# Patient Record
Sex: FEMALE | Race: WHITE | Employment: OTHER | URBAN - METROPOLITAN AREA
[De-identification: names, ages, dates, MRNs, and addresses within clinical notes are randomized per-mention and may not be internally consistent; named-entity substitution may affect disease eponyms.]

---

## 2018-10-08 ENCOUNTER — HOSPITAL ENCOUNTER (OUTPATIENT)
Dept: CT IMAGING | Age: 84
Discharge: HOME | End: 2018-10-10
Payer: MEDICARE

## 2018-10-08 DIAGNOSIS — J31.2: ICD-10-CM

## 2018-10-08 PROCEDURE — 70490 CT SOFT TISSUE NECK W/O DYE: CPT

## 2018-12-07 ENCOUNTER — HOSPITAL ENCOUNTER (OUTPATIENT)
Age: 84
Discharge: HOME | End: 2018-12-09
Payer: MEDICARE

## 2018-12-07 ENCOUNTER — HOSPITAL ENCOUNTER (OUTPATIENT)
Dept: GENERAL RADIOLOGY | Age: 84
Discharge: HOME | End: 2018-12-09
Payer: MEDICARE

## 2018-12-07 DIAGNOSIS — R52 PAIN: ICD-10-CM

## 2018-12-07 PROCEDURE — 72110 X-RAY EXAM L-2 SPINE 4/>VWS: CPT

## 2018-12-07 PROCEDURE — 73502 X-RAY EXAM HIP UNI 2-3 VIEWS: CPT

## 2019-09-22 ENCOUNTER — HOSPITAL ENCOUNTER (EMERGENCY)
Age: 85
Discharge: HOME | End: 2019-09-22
Attending: EMERGENCY MEDICINE
Payer: MEDICARE

## 2019-09-22 VITALS
TEMPERATURE: 98.8 F | RESPIRATION RATE: 16 BRPM | DIASTOLIC BLOOD PRESSURE: 78 MMHG | HEIGHT: 63 IN | WEIGHT: 160 LBS | SYSTOLIC BLOOD PRESSURE: 161 MMHG | OXYGEN SATURATION: 97 % | BODY MASS INDEX: 28.35 KG/M2 | HEART RATE: 80 BPM

## 2019-09-22 DIAGNOSIS — N39.0 URINARY TRACT INFECTION IN FEMALE: Primary | ICD-10-CM

## 2019-09-22 LAB
ANION GAP SERPL CALCULATED.3IONS-SCNC: 11 MMOL/L (ref 7–16)
BACTERIA: ABNORMAL /HPF
BILIRUBIN URINE: NEGATIVE
BLOOD, URINE: NEGATIVE
BUN BLDV-MCNC: 32 MG/DL (ref 8–23)
CALCIUM SERPL-MCNC: 9.1 MG/DL (ref 8.6–10.2)
CASTS UA: ABNORMAL /LPF
CHLORIDE BLD-SCNC: 103 MMOL/L (ref 98–107)
CLARITY: ABNORMAL
CO2: 27 MMOL/L (ref 22–29)
COLOR: YELLOW
CREAT SERPL-MCNC: 1.2 MG/DL (ref 0.5–1)
EPITHELIAL CELLS, UA: ABNORMAL /HPF
GFR AFRICAN AMERICAN: 51
GFR NON-AFRICAN AMERICAN: 42 ML/MIN/1.73
GLUCOSE BLD-MCNC: 119 MG/DL (ref 74–99)
GLUCOSE URINE: NEGATIVE MG/DL
HCT VFR BLD CALC: 36.2 % (ref 34–48)
HEMOGLOBIN: 11.9 G/DL (ref 11.5–15.5)
KETONES, URINE: ABNORMAL MG/DL
LEUKOCYTE ESTERASE, URINE: ABNORMAL
MCH RBC QN AUTO: 32.3 PG (ref 26–35)
MCHC RBC AUTO-ENTMCNC: 32.9 % (ref 32–34.5)
MCV RBC AUTO: 98.4 FL (ref 80–99.9)
MUCUS: PRESENT
NITRITE, URINE: NEGATIVE
PDW BLD-RTO: 14 FL (ref 11.5–15)
PH UA: 6 (ref 5–9)
PLATELET # BLD: 196 E9/L (ref 130–450)
PMV BLD AUTO: 10.9 FL (ref 7–12)
POTASSIUM SERPL-SCNC: 4.2 MMOL/L (ref 3.5–5)
PROTEIN UA: NEGATIVE MG/DL
RBC # BLD: 3.68 E12/L (ref 3.5–5.5)
RBC UA: ABNORMAL /HPF (ref 0–2)
RENAL EPITHELIAL, UA: ABNORMAL /HPF
SODIUM BLD-SCNC: 141 MMOL/L (ref 132–146)
SPECIFIC GRAVITY UA: 1.02 (ref 1–1.03)
UROBILINOGEN, URINE: 0.2 E.U./DL
WBC # BLD: 6 E9/L (ref 4.5–11.5)
WBC UA: >20 /HPF (ref 0–5)

## 2019-09-22 PROCEDURE — 99284 EMERGENCY DEPT VISIT MOD MDM: CPT

## 2019-09-22 PROCEDURE — 80048 BASIC METABOLIC PNL TOTAL CA: CPT

## 2019-09-22 PROCEDURE — 85027 COMPLETE CBC AUTOMATED: CPT

## 2019-09-22 PROCEDURE — 93005 ELECTROCARDIOGRAM TRACING: CPT | Performed by: EMERGENCY MEDICINE

## 2019-09-22 PROCEDURE — 81001 URINALYSIS AUTO W/SCOPE: CPT

## 2019-09-22 PROCEDURE — 6370000000 HC RX 637 (ALT 250 FOR IP): Performed by: EMERGENCY MEDICINE

## 2019-09-22 PROCEDURE — 2580000003 HC RX 258: Performed by: EMERGENCY MEDICINE

## 2019-09-22 RX ORDER — 0.9 % SODIUM CHLORIDE 0.9 %
1000 INTRAVENOUS SOLUTION INTRAVENOUS ONCE
Status: COMPLETED | OUTPATIENT
Start: 2019-09-22 | End: 2019-09-22

## 2019-09-22 RX ORDER — CIPROFLOXACIN 500 MG/1
250 TABLET, FILM COATED ORAL 2 TIMES DAILY
Qty: 10 TABLET | Refills: 0 | Status: SHIPPED | OUTPATIENT
Start: 2019-09-22 | End: 2019-10-02

## 2019-09-22 RX ORDER — CIPROFLOXACIN 500 MG/1
500 TABLET, FILM COATED ORAL ONCE
Status: COMPLETED | OUTPATIENT
Start: 2019-09-22 | End: 2019-09-22

## 2019-09-22 RX ADMIN — SODIUM CHLORIDE 1000 ML: 9 INJECTION, SOLUTION INTRAVENOUS at 17:29

## 2019-09-22 RX ADMIN — CIPROFLOXACIN HYDROCHLORIDE 500 MG: 500 TABLET, FILM COATED ORAL at 18:39

## 2019-09-22 ASSESSMENT — PAIN DESCRIPTION - PAIN TYPE: TYPE: ACUTE PAIN

## 2019-09-22 ASSESSMENT — PAIN DESCRIPTION - ORIENTATION: ORIENTATION: RIGHT;LEFT

## 2019-09-22 ASSESSMENT — PAIN DESCRIPTION - FREQUENCY: FREQUENCY: CONTINUOUS

## 2019-09-22 ASSESSMENT — PAIN DESCRIPTION - DESCRIPTORS: DESCRIPTORS: BURNING

## 2019-09-22 ASSESSMENT — PAIN - FUNCTIONAL ASSESSMENT: PAIN_FUNCTIONAL_ASSESSMENT: ACTIVITIES ARE NOT PREVENTED

## 2019-09-22 ASSESSMENT — PAIN SCALES - GENERAL: PAINLEVEL_OUTOF10: 7

## 2019-09-22 ASSESSMENT — PAIN DESCRIPTION - LOCATION: LOCATION: ABDOMEN;FLANK

## 2019-09-22 NOTE — ED PROVIDER NOTES
HPI:  9/22/19, Time: 4:53 PM         Kayla Hagen is a 81 y.o. female presenting to the ED for abdominal pain and flank pain. This been ongoing over the last 2 weeks and gradually worsening. She states she has associated burning with urination and burning after urination. Nuys any vaginal bleeding or discharge. Denies any fevers chills, chest tightness pressure, nausea or vomiting, shortness of breath. Denies any history of kidney stones but does states she has had UTIs in the past.  Review shows he is allergic to Keflex. Review of Systems:   Pertinent positives and negatives are stated within HPI, all other systems reviewed and are negative.          --------------------------------------------- PAST HISTORY ---------------------------------------------  Past Medical History:  has a past medical history of Anxiety, Depression, Dysphagia, GERD (gastroesophageal reflux disease), Hyperlipidemia, Hypertension, Hypothyroidism, and Osteoarthritis. Past Surgical History:  has a past surgical history that includes joint replacement; Hysterectomy; Appendectomy; Upper gastrointestinal endoscopy; Endoscopy, colon, diagnostic (3/3/2014); Cholecystectomy, laparoscopic; and Carpal tunnel release (Right, 10/05/2016). Social History:  reports that she has never smoked. She has never used smokeless tobacco. She reports that she drinks about 1.0 standard drinks of alcohol per week. She reports that she does not use drugs. Family History: family history is not on file. The patients home medications have been reviewed.     Allergies: Keflex [cephalexin] and Reglan [metoclopramide hcl]    -------------------------------------------------- RESULTS -------------------------------------------------  All laboratory and radiology results have been personally reviewed by myself   LABS:  Results for orders placed or performed during the hospital encounter of 09/22/19   Urinalysis with Microscopic   Result Value Ref

## 2019-09-23 LAB
EKG ATRIAL RATE: 86 BPM
EKG P AXIS: 45 DEGREES
EKG P-R INTERVAL: 152 MS
EKG Q-T INTERVAL: 418 MS
EKG QRS DURATION: 118 MS
EKG QTC CALCULATION (BAZETT): 500 MS
EKG R AXIS: 0 DEGREES
EKG T AXIS: 83 DEGREES
EKG VENTRICULAR RATE: 86 BPM

## 2019-09-23 PROCEDURE — 93010 ELECTROCARDIOGRAM REPORT: CPT | Performed by: INTERNAL MEDICINE

## 2020-09-24 LAB — MAMMOGRAPHY, EXTERNAL: NORMAL

## 2020-11-03 ENCOUNTER — OFFICE VISIT (OUTPATIENT)
Dept: PHYSICAL MEDICINE AND REHAB | Age: 86
End: 2020-11-03
Payer: MEDICARE

## 2020-11-03 VITALS
OXYGEN SATURATION: 99 % | SYSTOLIC BLOOD PRESSURE: 149 MMHG | HEART RATE: 69 BPM | HEIGHT: 63 IN | TEMPERATURE: 97.2 F | BODY MASS INDEX: 25.98 KG/M2 | DIASTOLIC BLOOD PRESSURE: 82 MMHG | WEIGHT: 146.6 LBS

## 2020-11-03 PROCEDURE — 20610 DRAIN/INJ JOINT/BURSA W/O US: CPT | Performed by: PHYSICAL MEDICINE & REHABILITATION

## 2020-11-03 PROCEDURE — 99204 OFFICE O/P NEW MOD 45 MIN: CPT | Performed by: PHYSICAL MEDICINE & REHABILITATION

## 2020-11-03 RX ORDER — TRIAMCINOLONE ACETONIDE 40 MG/ML
40 INJECTION, SUSPENSION INTRA-ARTICULAR; INTRAMUSCULAR ONCE
Status: COMPLETED | OUTPATIENT
Start: 2020-11-03 | End: 2020-11-03

## 2020-11-03 RX ORDER — LIDOCAINE HYDROCHLORIDE 10 MG/ML
4 INJECTION, SOLUTION INFILTRATION; PERINEURAL ONCE
Status: COMPLETED | OUTPATIENT
Start: 2020-11-03 | End: 2020-11-03

## 2020-11-03 RX ORDER — GABAPENTIN 100 MG/1
CAPSULE ORAL
COMMUNITY
Start: 2020-09-29 | End: 2020-12-15

## 2020-11-03 RX ADMIN — LIDOCAINE HYDROCHLORIDE 4 ML: 10 INJECTION, SOLUTION INFILTRATION; PERINEURAL at 10:27

## 2020-11-03 RX ADMIN — TRIAMCINOLONE ACETONIDE 40 MG: 40 INJECTION, SUSPENSION INTRA-ARTICULAR; INTRAMUSCULAR at 10:27

## 2020-11-03 NOTE — PROGRESS NOTES
[facility] PM[facility]R at [facility] [place], [address]  Phone: [phone]  Fax: [phone]    New Patient Evaluation for Ashley Salinas  : 4/15/1926  MRN: [id number]  PCP: Tom Mari MD  REF: No ref. provider found  Date of visit: 11/3/20    Chief Complaint   Patient presents with    Shoulder Pain     Bilateral shoulder pain x 1 year. Right shoulder pain is worse. Taking Gabapentin with no relief.  Neck Pain     Pain in neck x 1 year. Had a nerve block during the summer it helped for 3 days. Thank you for your referral of Ashley Salinas to the Department of PM&R for evaluation of shoulder pain. As you know, this is a 81 y.o. RHD female with pertinent past medical history of MAURICE, hypothyroidism, hypertension, hyperlipidemia, GERD, dysphagia, depression, anxiety. HPI:   Patient presents today as new patient evaluation for bilateral (R>>L) lateral arm pain without injury at onset. Patient does explain her symptoms vaguely. Prior to my evaluation, she was seeing PM&R outside the system. She did receive one cervical epidural steroid injection which provided no benefit. The patient does not have any shoulder films. Pain began 1 year ago without inciting event. Location: bilateral lateral shoulder, R>L   Quality: \"Pain\"  Severity: 8/10. Pain Alleviated minimally by heat. Aggravated by overhead, activities. Timing: constant  Sensation: + Vague numbness/tingling of bilateral lateral arms. PRIOR INJURIES/TREATMENT:  Ice/Heat: Yes, temporary relief  Brace: No  Medications:    Currently: Acetaminophen 1000 mg PRN, gabapentin 300 mg qHS   Past: None  Physical Therapy: None  Injection: Cervical CHETAN 6 months ago, minimal benefit  Prior Surgery in location of pain: No  Prior Fracture/Injury in location of pain: No     Falls: No     No new N/T, weakness. No new B/B changes. The prior workup has included: Xray, MRI. Diagnostic Studies:  -MRI C-SPINE from [facility] dated 6/29/2020 (reviewed personally on 11/3/2020) demonstrates:   Findings: The normal cervical lordotic curvature is maintained. No significant scoliosis is noted. There is no evidence of spondylolisthesis. The bone marrow shows no evidence of suspicious infiltrate. C2-3: No significant abnormality. C3-4: There is a shallow left-sided posterior disc osteophyte complex which focally contacts the thecal sac. There is minimal bilateral uncovertebral joint arthritis. C4-5: There is moderate disc space narrowing. There is a posterior disc osteophyte complex which impinges upon the cervical spinal cord. There is mild bilateral uncovertebral joint and facet joint osteoarthritis without significant neuroforaminal narrowing. C5-6: There is severe disc space narrowing. There is a broad-based posterior disc osteophyte complex which leads to mild chronic spinal cord compression. The underlying cord shows no evidence of edema, contusion, or myelomalacia. There is mild to moderate left-sided uncovertebral joint osteoarthritis, mild right vertebral joint arthritis, moderate left and mild right facet joint osteoarthritis. There is mild resulting narrowing of the left neural foramen. C6-7: There is severe disc space narrowing. There is a posterior disc osteophyte complex which leads to mild chronic spinal cord compression at this level. No spinal cord contusion, edema, or myelomalacia is seen. C7-T1: Unremarkable. Impression:  Degenerative disc disease and degenerative joint disease. At C5-C6 and C6-C7, there is chronic spinal cord compression without evidence of spinal cord contusion, edema, or myelomalacia. Focal neuroforaminal narrowing, focal facet joint and uncovertebral joint osteoarthritis as above.     Allergies   Allergen Reactions    Keflex [Cephalexin]     Reglan [Metoclopramide Hcl] Other (See Comments)     Makes me feel \"high\"       Current Outpatient Medications   Medication Sig Dispense Refill    gabapentin (NEURONTIN) 100 MG capsule TAKE 1 CAPSULE BY MOUTH THREE TIMES DAILY      doxycycline monohydrate (MONODOX) 100 MG capsule Take 1 capsule by mouth 2 times daily 20 capsule 0    ranitidine (ZANTAC) 300 MG tablet Take 300 mg by mouth nightly      metoprolol (TOPROL-XL) 25 MG XL tablet Take 1 tablet by mouth daily. Instructed to take with sip water am of procedure 30 tablet 0    Multiple Vitamins-Minerals (THERAPEUTIC MULTIVITAMIN-MINERALS) tablet Take 1 tablet by mouth daily       Omega 3 1000 MG CAPS Take 1,000 mg by mouth 3 tabs nightly      Ascorbic Acid (VITAMIN C) 500 MG CAPS Take 1 tablet by mouth daily       Calcium-Vitamin D 500-125 MG-UNIT TABS Take 1 tablet by mouth daily       Cholecalciferol (VITAMIN D-3) 5000 UNITS TABS Take 1,000 Units by mouth daily       b complex vitamins capsule Take 1 capsule by mouth daily       vitamin E 400 UNIT capsule Take 400 Units by mouth daily       vitamin B-12 (CYANOCOBALAMIN) 100 MCG tablet Take 250 mcg by mouth daily       Multiple Vitamins-Minerals (PRESERVISION/LUTEIN) CAPS Take 1 capsule by mouth 2 times daily       losartan (COZAAR) 50 MG tablet Take 50 mg by mouth daily       amitriptyline (ELAVIL) 50 MG tablet Take 50 mg by mouth nightly.  ALPRAZolam (XANAX) 0.5 MG tablet Take 0.5 mg by mouth nightly as needed.  citalopram (CELEXA) 20 MG tablet Take 20 mg by mouth daily. Instructed to take with sip water am of procedure      levothyroxine (SYNTHROID) 100 MCG tablet Take 100 mcg by mouth daily       simvastatin (ZOCOR) 40 MG tablet Take 40 mg by mouth nightly. No current facility-administered medications for this visit.         Past Medical History:   Diagnosis Date    Anxiety     Depression     Dysphagia 2/20/2014    GERD (gastroesophageal reflux disease) 2/20/2014    Hyperlipidemia     Hypertension     Hypothyroidism     Osteoarthritis        Past Surgical History:   Procedure Laterality Date    APPENDECTOMY      CARPAL TUNNEL RELEASE Right 10/05/2016    RIGHT INDEX FINGER TRIGGER RELEASE RIGHT THUMB CARPAL METACARPAL ARTHROPLASTY WITH LIGAMENT RECONSTRUCTION    CHOLECYSTECTOMY, LAPAROSCOPIC      ENDOSCOPY, COLON, DIAGNOSTIC  3/3/2014    biopsy    HYSTERECTOMY      45 yrs ago     JOINT REPLACEMENT      bilateral knees    UPPER GASTROINTESTINAL ENDOSCOPY         History reviewed. No pertinent family history. Social History     Tobacco Use    Smoking status: Never Smoker    Smokeless tobacco: Never Used   Substance Use Topics    Alcohol use: Yes     Alcohol/week: 1.0 standard drinks     Types: 1 Glasses of wine per week     Comment: once a month, occasionally a glass of wine    Drug use: No          Functional Status: The patient is able to ambulate and perform activities of daily living without the use of an assistive device. Occupation: The patient is currently unemployed. ROS:    Constitutional: Denies fevers, chills, unintentional weight loss     Skin: Denies rash or skin changes     Eyes: Denies vision changes    Ears/Nose/Throat: Denies nasal congestion or sore throat     Respiratory: Denies SOB or cough     Cardiovascular: Denies CP, palpitations, edema      Gastrointestinal: Denies abdominal pain, N/V, constipation, or diarrhea    Genitourinary: Denies urinary symptoms    Neurologic: See HPI.     MSK: See HPI. Psychiatric: Denies sleep disturbance, anxiety, depression    Hematologic/Lymphatic/Immunologic: Denies bruising     Physical Exam:   Blood pressure (!) 149/82, pulse 69, temperature 97.2 °F (36.2 °C), temperature source Infrared, height 5' 3\" (1.6 m), weight 146 lb 9.6 oz (66.5 kg), SpO2 99 %. PAIN: 8/10  GEN APPEARANCE: Pleasant, well developed, well nourished in no acute distress;  Alert and Oriented; body habitus is average  PSYCH: Normal mood and affect   HEAD: Normocephalic; no facial asymetry noted  EYES: Pupils equal and reactive; EOMI  RESP: Breathing non-labored, no cyanosis  CARDIO: No pitting edema in bilateral lower extremities   SKIN: No lesions grossly visible on bilateral shoulders    MSK:    Shoulder Exam:    Inspection   Shoulders are symmetric without muscle atrophy. AC joints are without deformity    Palpation right side: There are no bony deformities noted to the clavicle, acromion, scapula, humerus, or sternum  There is tenderness at the [facility] joint  There is no tenderness at the Sternoclavicular joint  There is tenderness (not concordant) to the bicipital tendon  There is concordant tenderness to the greater tuberosity  There is no levator scapula tenderness   There is no periscapular tenderness     Palpation left side: There are no bony deformities noted to the clavicle, acromion, scapula, humerus, or sternum  There is tenderness at the [facility] joint  There is no tenderness at the Sternoclavicular joint  There is tenderness (not concordant) to the bicipital tendon  There is concordant tenderness to the greater tuberosity  There is no levator scapula tenderness   There is no periscapular tenderness     AROM OF SHOULDER: RIGHT   LEFT  Flexion:     180   180  Abduction:    170   170  IR:      90   90  ER:     90   90    PROM of SHOULDER:  Relatively painless and full.     Neurological Exam:  Strength:   R  L  Deltoid   5  5  Int rot   5 5  Ext rot   4 4  Biceps   5  5  Triceps  5  5  Wrist Ext  5  5  Finger Abd  5  5    Sensory:  intact LT in BUEs and BLEs     Reflexes:   R  L  Biceps   (2) (2)  Triceps  (2) (2)  BR   (2) (2)  Wick  (-) (-)    PROVOCATIVE TESTS right side:    Neer: neg  Campuzano: neg  Painful arc: neg  Drop arm test: neg  Full Can: neg  Empty Can: neg  Green's: minimally +  Speed's: neg  Apley's Scarf: +  Load and shift: minimally +    PROVOCATIVE TESTS left side:    Neer: neg  Campuzano: neg  Painful arc: neg  Drop arm test: neg  Full Can: neg  Empty Can: neg  Green's: minimally +  Speed's: neg  Apley's Scarf: +  Load and shift: minimally +    Spurling's Maneuver was negative bilaterally. Impression:   Zainab Ward is a 81 y.o. female who presents with bilateral arm pain secondary to ? shoulder impingement vs cervical degenerative changes. 1. Chronic right shoulder pain    2. Right arm pain    3. Chronic left shoulder pain    4. Left arm pain    5. Chronic neck pain        Recommendations:  1. Discussion: I have discussed the natural history of the above diagnoses with her in detail, with more than 1/2 of the visit spent counseling her on the pathophysiology and treatment options. Discussed trialing a right SAB steroid injection today as a diagnostic and/or therapeutic measure. Patient not interested in PT which is first-line treatment. 2. Activity Modification: Patient to continue being as physically active as possible while avoiding complete inactivity. No current restrictions placed. 3. Medications: Reviewed medications and no changes made at this time. 4. Referrals: Recommending formal physical therapy but patient declined at this time. No surgical referral needed at this point. 5. Images: X-rays of bilateral shoulders to evaluate for any arthritic changes. 6. Labs: None today. 7. DME: None today. 8. Injection: R SAB injection as below. If this is not effective, can consider ultrasound-guided GH steroid injection VS suprascapular nerve block. 9. Follow-up: 2-4 weeks. At that time we will review progress with above interventions. The patient was educated about the diagnosis, prognosis, indications, risks and benefits of treatment. An opportunity to ask questions was given to the patient and questions were answered. The patient agreed to proceed with the recommended treatment as described above.      Right subacromial bursa steroid injection -   Pre-procedure Verification: verified patient, procedure and site, verified availability of needed equipment and medications, verified patient consent and verified current history and physical  Site Marked: Yes  Timeout: completed prior to procedure, confirmed correct patient, confirmed correct procedure, confirmed correct site and confirmed correct consent. No heat sources were identified. After having explained the potential risks (pain, local bleeding, infection, hyperglycemia, hypertension, lack of benefit, cartilage decay), benefits and alternatives of the right subacromial bursa steroid injection, the patient gave verbal informed consent to proceed. A timeout was performed, as above. The right posterior shoulder was confirmed and marked, and the area was prepped in aseptic fashion with ChloraPrep. Cold spray was used to superficially anesthetize the skin. A 25 gauge, 1.5 inch needle was directed into the above area. The injection was completed with 1 cc of 40 mg/cc of Kenalog mixed with 4 cc of 1% Lidocaine. The patient tolerated the procedure without difficulty and was instructed on post-injection care. Pre-procedure pain score: 8/10  Post-procedure pain score: \"improved\"    Thank you for the consultation and for allowing me to participate in the care of this patient. Sincerely,     Dylon Watkins., DO  Physical Medicine and Rehabilitation     Please note that the above documentation was prepared using voice recognition software. Every attempt was made to ensure accuracy but there may be spelling, grammatical, and contextual errors.

## 2020-11-04 ENCOUNTER — HOSPITAL ENCOUNTER (OUTPATIENT)
Age: 86
Discharge: HOME | End: 2020-11-06
Payer: MEDICARE

## 2020-11-04 ENCOUNTER — HOSPITAL ENCOUNTER (OUTPATIENT)
Dept: GENERAL RADIOLOGY | Age: 86
Discharge: HOME | End: 2020-11-06
Payer: MEDICARE

## 2020-11-04 PROCEDURE — 73030 X-RAY EXAM OF SHOULDER: CPT

## 2020-12-15 ENCOUNTER — OFFICE VISIT (OUTPATIENT)
Dept: PHYSICAL MEDICINE AND REHAB | Age: 86
End: 2020-12-15
Payer: MEDICARE

## 2020-12-15 VITALS
TEMPERATURE: 98.1 F | SYSTOLIC BLOOD PRESSURE: 128 MMHG | BODY MASS INDEX: 24.98 KG/M2 | DIASTOLIC BLOOD PRESSURE: 74 MMHG | WEIGHT: 141 LBS | HEIGHT: 63 IN

## 2020-12-15 PROCEDURE — 99215 OFFICE O/P EST HI 40 MIN: CPT | Performed by: PHYSICAL MEDICINE & REHABILITATION

## 2020-12-15 PROCEDURE — 20606 DRAIN/INJ JOINT/BURSA W/US: CPT | Performed by: PHYSICAL MEDICINE & REHABILITATION

## 2020-12-15 PROCEDURE — 20611 DRAIN/INJ JOINT/BURSA W/US: CPT | Performed by: PHYSICAL MEDICINE & REHABILITATION

## 2020-12-15 RX ORDER — GABAPENTIN 100 MG/1
100 CAPSULE ORAL 3 TIMES DAILY
Qty: 90 CAPSULE | Refills: 2 | Status: SHIPPED
Start: 2020-12-15 | End: 2021-04-07 | Stop reason: SDUPTHER

## 2020-12-15 RX ORDER — TRIAMCINOLONE ACETONIDE 40 MG/ML
40 INJECTION, SUSPENSION INTRA-ARTICULAR; INTRAMUSCULAR ONCE
Status: COMPLETED | OUTPATIENT
Start: 2020-12-15 | End: 2020-12-15

## 2020-12-15 RX ORDER — LIDOCAINE HYDROCHLORIDE 10 MG/ML
9 INJECTION, SOLUTION INFILTRATION; PERINEURAL ONCE
Status: COMPLETED | OUTPATIENT
Start: 2020-12-15 | End: 2020-12-15

## 2020-12-15 RX ADMIN — LIDOCAINE HYDROCHLORIDE 9 ML: 10 INJECTION, SOLUTION INFILTRATION; PERINEURAL at 10:26

## 2020-12-15 RX ADMIN — TRIAMCINOLONE ACETONIDE 40 MG: 40 INJECTION, SUSPENSION INTRA-ARTICULAR; INTRAMUSCULAR at 10:27

## 2020-12-15 RX ADMIN — TRIAMCINOLONE ACETONIDE 40 MG: 40 INJECTION, SUSPENSION INTRA-ARTICULAR; INTRAMUSCULAR at 10:28

## 2020-12-15 NOTE — PROGRESS NOTES
[facility]  [address]  Phone: [phone]  Fax: [phone]    Follow Up Evaluation for Anai Thornton  : 4/15/1926  MRN: [id number]  PCP: Debo Lundebrg MD  REF: No ref. provider found  Date of visit: 12/15/20  Last Visit: 11/3/20    As you know, this is a 81 y.o. female with pertinent past medical history of MAURICE, hypothyroidism, hypertension, hyperlipidemia, GERD, dysphagia, depression, anxiety. Chief Complaint   Patient presents with    Shoulder Pain     having shoulder and hip pain had injections last visit opposite should is now hurting had xrays on  [facility] mild arthriits and versitis    Other     [facility] pain doctor prescribed gabapentin and it helped the patient but daughter didnt care for taking her there so she started coming to [facility] but never resumed the gabapentin       HPI:   Patient presents today in follow-up regarding several musculoskeletal complaints. Recall, at last visit, a right SAB steroid injection was performed which provided complete pain relief. Now, she has pain in the left shoulder and left lateral hip. Pain began several weeks ago without inciting event. Denies any falls or trauma to the regions of pain. Location: Left shoulder (AC J), left lateral hip  Quality: Pain  Severity: 8/10. Pain Alleviated by nothing. Aggravated by movement of shoulder, walking, lying on left side  Timing: constant  Sensation: No numbness or tingling     PRIOR INJURIES/TREATMENT:  Ice/Heat: Yes, temporary relief  Brace: No  Medications:               Currently: Acetaminophen 1000 mg PRN, gabapentin 300 mg qHS              Past: None  Physical Therapy: None  Injection: Cervical CHETAN 6 months ago, minimal benefit  Prior Surgery in location of pain: No  Prior Fracture/Injury in location of pain: No     Falls: No     No new N/T, weakness.  No new B/B changes.     The prior workup has included: Xray, MRI. Diagnostic Studies:  - MRI C-SPINE from [facility] dated 6/29/2020 (report reviewed personally on 12/15/2020) demonstrates:   Findings: The normal cervical lordotic curvature is maintained. No significant scoliosis is noted.     There is no evidence of spondylolisthesis.     The bone marrow shows no evidence of suspicious infiltrate.     C2-3: No significant abnormality.     C3-4: There is a shallow left-sided posterior disc osteophyte complex which focally contacts the thecal sac. There is minimal bilateral uncovertebral joint arthritis.     C4-5: There is moderate disc space narrowing. There is a posterior disc osteophyte complex which impinges upon the cervical spinal cord. There is mild bilateral uncovertebral joint and facet joint osteoarthritis without significant neuroforaminal narrowing.     C5-6: There is severe disc space narrowing. There is a broad-based posterior disc osteophyte complex which leads to mild chronic spinal cord compression. The underlying cord shows no evidence of edema, contusion, or myelomalacia. There is mild to moderate left-sided uncovertebral joint osteoarthritis, mild right vertebral joint arthritis, moderate left and mild right facet joint osteoarthritis. There is mild resulting narrowing of the left neural foramen.     C6-7: There is severe disc space narrowing. There is a posterior disc osteophyte complex which leads to mild chronic spinal cord compression at this level. No spinal cord contusion, edema, or myelomalacia is seen.     C7-T1: Unremarkable.     Impression:  Degenerative disc disease and degenerative joint disease. At C5-C6 and C6-C7, there is chronic spinal cord compression without evidence of spinal cord contusion, edema, or myelomalacia. Focal neuroforaminal narrowing, focal facet joint and uncovertebral joint osteoarthritis as above. - XR BILAT SHOULDERS from [facility] dated 11/4/2020 (reviewed personally on 12/15/2020) demonstrates:    FINDINGS:   Right shoulder: Mild osteoarthritis at the glenohumeral and AC joints.  The   acromion is pointed and downward sloping thought to be greatly predisposing   to it no fracture or dislocation.       Left shoulder: Mild osteoarthritis at the glenohumeral and AC joints.  The   acromion is mildly pointed and is thought to be somewhat predisposing to   rotator cuff injury.  No acute fracture or dislocation.  Old fracture present   of the left 7th rib.  Normal soft tissues.       The bones are demineralized. Allergies   Allergen Reactions    Keflex [Cephalexin]     Reglan [Metoclopramide Hcl] Other (See Comments)     Makes me feel \"high\"       Current Outpatient Medications   Medication Sig Dispense Refill    gabapentin (NEURONTIN) 100 MG capsule Take 1 capsule by mouth 3 times daily for 90 days. Intended supply: 90 days 90 capsule 2    doxycycline monohydrate (MONODOX) 100 MG capsule Take 1 capsule by mouth 2 times daily 20 capsule 0    ranitidine (ZANTAC) 300 MG tablet Take 300 mg by mouth nightly      metoprolol (TOPROL-XL) 25 MG XL tablet Take 1 tablet by mouth daily.  Instructed to take with sip water am of procedure 30 tablet 0    Multiple Vitamins-Minerals (THERAPEUTIC MULTIVITAMIN-MINERALS) tablet Take 1 tablet by mouth daily       Omega 3 1000 MG CAPS Take 1,000 mg by mouth 3 tabs nightly      Ascorbic Acid (VITAMIN C) 500 MG CAPS Take 1 tablet by mouth daily       Calcium-Vitamin D 500-125 MG-UNIT TABS Take 1 tablet by mouth daily       Cholecalciferol (VITAMIN D-3) 5000 UNITS TABS Take 1,000 Units by mouth daily       b complex vitamins capsule Take 1 capsule by mouth daily       vitamin E 400 UNIT capsule Take 400 Units by mouth daily       vitamin B-12 (CYANOCOBALAMIN) 100 MCG tablet Take 250 mcg by mouth daily  Multiple Vitamins-Minerals (PRESERVISION/LUTEIN) CAPS Take 1 capsule by mouth 2 times daily       losartan (COZAAR) 50 MG tablet Take 50 mg by mouth daily       amitriptyline (ELAVIL) 50 MG tablet Take 50 mg by mouth nightly.  ALPRAZolam (XANAX) 0.5 MG tablet Take 0.5 mg by mouth nightly as needed.  citalopram (CELEXA) 20 MG tablet Take 20 mg by mouth daily. Instructed to take with sip water am of procedure      levothyroxine (SYNTHROID) 100 MCG tablet Take 100 mcg by mouth daily       simvastatin (ZOCOR) 40 MG tablet Take 40 mg by mouth nightly. No current facility-administered medications for this visit. Past Medical History:   Diagnosis Date    Anxiety     Depression     Dysphagia 2/20/2014    GERD (gastroesophageal reflux disease) 2/20/2014    Hyperlipidemia     Hypertension     Hypothyroidism     Osteoarthritis        Past Surgical History:   Procedure Laterality Date    APPENDECTOMY      CARPAL TUNNEL RELEASE Right 10/05/2016    RIGHT INDEX FINGER TRIGGER RELEASE RIGHT THUMB CARPAL METACARPAL ARTHROPLASTY WITH LIGAMENT RECONSTRUCTION    CHOLECYSTECTOMY, LAPAROSCOPIC      ENDOSCOPY, COLON, DIAGNOSTIC  3/3/2014    biopsy    HYSTERECTOMY      45 yrs ago     JOINT REPLACEMENT      bilateral knees    UPPER GASTROINTESTINAL ENDOSCOPY         Social History     Tobacco Use    Smoking status: Never Smoker    Smokeless tobacco: Never Used   Substance Use Topics    Alcohol use: Yes     Alcohol/week: 1.0 standard drinks     Types: 1 Glasses of wine per week     Comment: once a month, occasionally a glass of wine    Drug use: No        Functional Status: The patient is able to ambulate and perform activities of daily living without the use of an assistive device. ROS:    Constitutional: Denies fevers, chills    Neurologic: See HPI.     MSK: See HPI. Psychiatric: + sleep disturbance.   Denies anxiety, depression    Physical Exam: Blood pressure 128/74, temperature 98.1 °F (36.7 °C), height 5' 3\" (1.6 m), weight 141 lb (64 kg). PAIN: 8/10  GEN APPEARANCE: Pleasant, well developed, well nourished in no acute distress; Alert and Oriented; body habitus is not obese  PSYCH: Normal mood and affect   SKIN: No lesions grossly visible on left shoulder or left hip    MSK:    Shoulder Exam:     Inspection   Shoulders are symmetric without muscle atrophy. Left AC joints are with mildmoderate deformity     Palpation left side: There are no bony deformities noted to the clavicle, acromion, scapula, humerus, or sternum  There is concordance tenderness at the [facility] joint  There is no tenderness at the Sternoclavicular joint  There is no to the bicipital tendon  There is mild tenderness to the greater tuberosity  There is no levator scapula tenderness   There is no periscapular tenderness     Palpation right side:   There are no bony deformities noted to the clavicle, acromion, scapula, humerus, or sternum  There is no tenderness at the [facility] joint  There is no tenderness at the Sternoclavicular joint  There is no tenderness to the bicipital tendon  There is  no tenderness to the greater tuberosity  There is no levator scapula tenderness   There is no periscapular tenderness      AROM OF SHOULDER:        RIGHT                         LEFT  Flexion:                                               180                              180  Abduction:                                           170                              170  IR:                                                        90                                90  ER:                                                      90                                90     PROM of SHOULDER:  Relatively painless and full.     Neurological Exam:  Strength:                     R          L  Deltoid                         5          5  Int rot                           5          5 3. Medications: Reviewed medications and no changes made at this time. 4. Referrals: No surgical referral needed at this point. 5. Images: No red flags on examination today, such as severe or progressive neurological deficits or suspicion of serious underlying condition. With that said, additional imaging not indicated at this time. We will continue to monitor response to conservative treatment. 6. Labs: None today. 7. DME: None today. 8. Injection: 2 ultrasound-guided injections, as below. 9. Follow-up: 1 month. At that time we will review progress with above interventions. The patient was educated about the diagnosis, prognosis, indications, risks and benefits of treatment. An opportunity to ask questions was given to the patient and questions were answered. The patient agreed to proceed with the recommended treatment as described above. Musculoskeletal Ultrasound Performed at Today's Visit (1):  Purpose:  US Guided Intervention  Laterality:  left  Structure:  AC joint  Settings / Approach:  hockey stick  Interventions:     · Universal protocol documentation / Pre-Procedure Checklist:     · ID verified by two sources (select any two from list): MRN and Name       · Site: left AC joint   · Procedure: AC joint injection with Kenalog     · Site(s) marked: no      · Position: seated  · Consent: available       · History and Physical in chart  · Other relevant documentation: available       · Relevant images: available       · Implants: not applicable      ¨ Heat source: No  · Antibiotic: No    · Special Equipment: Musculoskeletal Ultrasound Guidance with image / CINE recording     · Blood products matched: not applicable       · Surgical/Procedure pause: yes       · Other pre-procedural information: given       · Patient concurs: yes       ?  Team present and concurs:  Ailyn Goyal DO; Kenisha Lee LPN  · Procedure Note · Medications used:   1ml of 40mg/ml Kenalog mixed with 1 ml of 1% lidocaine without epinephrine and 2 ml of 1% lidocaine without epinephrine to anesthetize the skin. Description of procedure:  After having explained the potential risks  (patient informed of risk of pain, local bleeding, infection, hyperglycemia, hypertension, lack of benefit, cartilage decay) and benefits of the left acromioclavicular joint injection, and after reviewing the patient's medication and at least two pertinent identifiers, the patient gave verbal consent to proceed. A preprocedural time-out was performed. The patient was then positioned and prepped in the usual sterile fashion with chloroprep, and target was identified with palpation and musculoskeletal ultrasound using settings described above. Using a 25gauge, 1.5 inch needle, 2cc of 1% Lidocaine was injected beneath the skin, and a wheal was raised. Subsequently, at this site,  a 22 gauge, 1.5 inch needle was directed into the Acromioclavicular joint utilizing real-time ultrasound guidance. After non-bloody aspiration to confirm extravascular needle tip placement, the injection was completed under real-time ultrasound guidance, with 40 mg of Kenalog mixed with 1 cc of 1% Lidocaine. There was negligible blood loss and no complications. An adhesive bandage was placed over the injection site. The patient tolerated the procedure well, and remained stable throughout. Immediately after the procedure, patient noted improvement in pain. The patient left in baseline health status.      Preprocedural pain 8/10  Post procedural pain 0/10    Pre-injection:      Needle view:      Musculoskeletal Ultrasound Performed at Today's Visit (2):  Purpose:  US Guided Injection  Laterality:  left   Structure:  gluteal bursa  Settings / Approach:  curvilinear probe  Interventions:     · Universal protocol documentation / Pre-Procedure Checklist: · ID verified by two sources (select any two from list): MRN and Name       · Site: left gluteal bursa   · Procedure: Gluteal bursa steroid injection  · Site(s) marked: no      · Position:  lateral decubitus position  · Consent: available       · History and Physical in chart  · Other relevant documentation: available       · Relevant images: available       · Implants: not applicable       · Special Equipment: Musculoskeletal Ultrasound Guidance with image / CINE recording     · Blood products matched: not applicable       · Surgical/Procedure pause: yes       · Other pre-procedural information: given       · Patient concurs: yes       ?  Team present and concurs:  Dalia Morales DO; Jeanette Richards LPN  · Procedure Note     · Medications used:  1ml of 40mg/ml Kenalog mixed with 3ml of 1% lidocaine without epinephrine and 3ml of 1% lidocaine without epinephrine to anesthetize the skin · Description of procedure:  After having explained the potential risks  (patient informed of risk of pain, local bleeding, infection, hyperglycemia, hypertension, lack of benefit, cartilage decay) and benefits of the left gluteal bursa injection, and after reviewing the patient's medication and at least two pertinent identifiers, the patient gave verbal consent to proceed. A preprocedural time-out was performed. The patient was then positioned and prepped in the usual sterile fashion with chloroprep, and target was identified with palpation and musculoskeletal ultrasound using settings described above. After anesthetizing the overlying skin with 3ml 1% lidocaine using a 25 gauge 1.5 inch needle, a 22-gauge 3.5 inch needle was advanced under ultrasound guidance to the left gluteal bursa via a transverse approach to the greater trochanter. After non-bloody aspiration to confirm extravascular needle tip placement, 4 ml of the above mixture was injected without resistance. There was negligible blood loss and no complications. An adhesive bandage was placed over the injection site. The patient tolerated the procedure well, and remained stable throughout. The patient left in baseline health status. Preprocedural pain 8/10  Post procedural pain 0/10    Pre-injection:      Needle view:      Sincerely,     Dylon Maier, DO  Board Certified Physical Medicine and Rehabilitation     Please note that the above documentation was prepared using voice recognition software. Every attempt was made to ensure accuracy but there may be spelling, grammatical, and contextual errors.

## 2021-01-08 LAB
ALBUMIN SERPL-MCNC: NORMAL G/DL
ALP BLD-CCNC: NORMAL U/L
ALT SERPL-CCNC: NORMAL U/L
ANION GAP SERPL CALCULATED.3IONS-SCNC: NORMAL MMOL/L
AST SERPL-CCNC: NORMAL U/L
BILIRUB SERPL-MCNC: NORMAL MG/DL
BUN BLDV-MCNC: NORMAL MG/DL
CALCIUM SERPL-MCNC: NORMAL MG/DL
CHLORIDE BLD-SCNC: NORMAL MMOL/L
CHOLESTEROL, TOTAL: NORMAL
CHOLESTEROL/HDL RATIO: NORMAL
CO2: NORMAL
CREAT SERPL-MCNC: NORMAL MG/DL
GFR CALCULATED: NORMAL
GLUCOSE BLD-MCNC: NORMAL MG/DL
HDLC SERPL-MCNC: NORMAL MG/DL
LDL CHOLESTEROL CALCULATED: NORMAL
NONHDLC SERPL-MCNC: NORMAL MG/DL
POTASSIUM SERPL-SCNC: NORMAL MMOL/L
SODIUM BLD-SCNC: NORMAL MMOL/L
TOTAL PROTEIN: NORMAL
TRIGL SERPL-MCNC: NORMAL MG/DL
TSH SERPL DL<=0.05 MIU/L-ACNC: NORMAL M[IU]/L
VITAMIN D 25-HYDROXY: NORMAL
VITAMIN D2, 25 HYDROXY: NORMAL
VITAMIN D3,25 HYDROXY: NORMAL
VLDLC SERPL CALC-MCNC: NORMAL MG/DL

## 2021-01-15 ENCOUNTER — OFFICE VISIT (OUTPATIENT)
Dept: PHYSICAL MEDICINE AND REHAB | Age: 87
End: 2021-01-15
Payer: MEDICARE

## 2021-01-15 VITALS
SYSTOLIC BLOOD PRESSURE: 118 MMHG | WEIGHT: 138 LBS | BODY MASS INDEX: 24.45 KG/M2 | DIASTOLIC BLOOD PRESSURE: 76 MMHG | HEIGHT: 63 IN | TEMPERATURE: 96.7 F

## 2021-01-15 DIAGNOSIS — M79.601 RIGHT ARM PAIN: Primary | ICD-10-CM

## 2021-01-15 DIAGNOSIS — M79.602 LEFT ARM PAIN: ICD-10-CM

## 2021-01-15 PROCEDURE — 99213 OFFICE O/P EST LOW 20 MIN: CPT | Performed by: PHYSICAL MEDICINE & REHABILITATION

## 2021-01-15 NOTE — PROGRESS NOTES
Dylon Steiner  Physical Medicine and Rehabilitation  [address]  Phone: [phone]  Fax: [phone]    Follow Up Evaluation for Anatoliy Monahan  : 4/15/1926  MRN: [id number]  PCP: Ammon Thomson MD  REF: No ref. provider found  Date of visit: 1/15/21  Last Visit: 12/15/20    As you know, this is a 81 y.o. female with pertinent past medical history of MAURICE, hypothyroidism, hypertension, hyperlipidemia, GERD, dysphagia, depression, anxiety. Chief Complaint   Patient presents with    Follow-up     follow up on left shoulder and left hip injection, patient stated the injections helped alot. no pain; bilateral arm pain (bicep) she cannot lay on her sides at, just experiencing discomfort while in bed laying down       HPI:   Patient presents today in follow-up for several musculoskeletal complaints. Recall, on 11/3/2020, a right SAB steroid injection was performed which provided complete pain relief. Also recall, on 12/15/2020, an ultrasound-guided left AC joint steroid injection and ultrasound-guided left gluteal bursa steroid injection was performed which provided complete pain relief. She now presents with vague pain in the lateral humerus region bilaterally without any injury at onset. Pain began unknown time ago without inciting event. Location: Bilateral lateral humerus region  Quality: Achy pain  Severity: 0/10 currently, 0/52 with certain movement. Pain Alleviated by nothing. Aggravated by certain movement  Timing: intermittent  Sensation: No numbness or tingling     PRIOR INJURIES/TREATMENT:  Ice/Heat: Yes, temporary relief  Brace: No  Medications:               Currently: Acetaminophen 1000 mg PRN, gabapentin 300 mg qHS              Past: None  Physical Therapy: None  Injection: Cervical CHETAN 6 months ago, minimal benefit. Multiple injections as above which were helpful for different areas of her body.   Prior Surgery in location of pain: No  Prior Fracture/Injury in location of pain: No     Falls: No     No new N/T, weakness. No new B/B changes. The prior workup has included: Xray, MRI. OARRS reviewed. Diagnostic Studies:  - MRI C-SPINE from [facility] dated 6/29/2020 (report reviewed personally on 1/15/2021) demonstrates:   Findings:   The normal cervical lordotic curvature is maintained.  No significant scoliosis is noted.     There is no evidence of spondylolisthesis.     The bone marrow shows no evidence of suspicious infiltrate.     C2-3: No significant abnormality.     C3-4: There is a shallow left-sided posterior disc osteophyte complex which focally contacts the thecal sac.  There is minimal bilateral uncovertebral joint arthritis.     C4-5: There is moderate disc space narrowing.  There is a posterior disc osteophyte complex which impinges upon the cervical spinal cord.  There is mild bilateral uncovertebral joint and facet joint osteoarthritis without significant neuroforaminal narrowing.     C5-6: There is severe disc space narrowing.  There is a broad-based posterior disc osteophyte complex which leads to mild chronic spinal cord compression.  The underlying cord shows no evidence of edema, contusion, or myelomalacia.  There is mild to moderate left-sided uncovertebral joint osteoarthritis, mild right vertebral joint arthritis, moderate left and mild right facet joint osteoarthritis.  There is mild resulting narrowing of the left neural foramen.     C6-7: There is severe disc space narrowing.  There is a posterior disc osteophyte complex which leads to mild chronic spinal cord compression at this level.  No spinal cord contusion, edema, or myelomalacia is seen.     C7-T1: Unremarkable.     Impression:  Degenerative disc disease and degenerative joint disease.  At C5-C6 and C6-C7, there is chronic spinal cord compression without evidence of spinal cord contusion, edema, or myelomalacia.  Focal neuroforaminal narrowing, focal facet joint and uncovertebral joint osteoarthritis as above.     - XR BILAT SHOULDERS from [facility] dated 11/4/2020 (reviewed personally on 1/15/2021) demonstrates:    FINDINGS:   Right shoulder: Mild osteoarthritis at the glenohumeral and AC joints.  The   acromion is pointed and downward sloping thought to be greatly predisposing   to it no fracture or dislocation.       Left shoulder: Mild osteoarthritis at the glenohumeral and AC joints.  The   acromion is mildly pointed and is thought to be somewhat predisposing to   rotator cuff injury.  No acute fracture or dislocation.  Old fracture present   of the left 7th rib.  Normal soft tissues.       The bones are demineralized.          Allergies   Allergen Reactions    Keflex [Cephalexin]     Reglan [Metoclopramide Hcl] Other (See Comments)     Makes me feel \"high\"       Current Outpatient Medications   Medication Sig Dispense Refill    diclofenac sodium (VOLTAREN) 1 % GEL Apply 2 g topically 2 times daily 350 g 1    gabapentin (NEURONTIN) 100 MG capsule Take 1 capsule by mouth 3 times daily for 90 days. Intended supply: 90 days 90 capsule 2    doxycycline monohydrate (MONODOX) 100 MG capsule Take 1 capsule by mouth 2 times daily 20 capsule 0    ranitidine (ZANTAC) 300 MG tablet Take 300 mg by mouth nightly      metoprolol (TOPROL-XL) 25 MG XL tablet Take 1 tablet by mouth daily.  Instructed to take with sip water am of procedure 30 tablet 0    Multiple Vitamins-Minerals (THERAPEUTIC MULTIVITAMIN-MINERALS) tablet Take 1 tablet by mouth daily       Omega 3 1000 MG CAPS Take 1,000 mg by mouth 3 tabs nightly      Ascorbic Acid (VITAMIN C) 500 MG CAPS Take 1 tablet by mouth daily       Calcium-Vitamin D 500-125 MG-UNIT TABS Take 1 tablet by mouth daily       Cholecalciferol (VITAMIN D-3) 5000 UNITS TABS Take 1,000 Units by mouth daily       b complex vitamins capsule Take 1 capsule by mouth daily       vitamin E 400 UNIT capsule Take 400 Units by mouth daily       vitamin B-12 (CYANOCOBALAMIN) 100 MCG tablet Take 250 mcg by mouth daily       Multiple Vitamins-Minerals (PRESERVISION/LUTEIN) CAPS Take 1 capsule by mouth 2 times daily       losartan (COZAAR) 50 MG tablet Take 50 mg by mouth daily       amitriptyline (ELAVIL) 50 MG tablet Take 50 mg by mouth nightly.  ALPRAZolam (XANAX) 0.5 MG tablet Take 0.5 mg by mouth nightly as needed.  citalopram (CELEXA) 20 MG tablet Take 20 mg by mouth daily. Instructed to take with sip water am of procedure      levothyroxine (SYNTHROID) 100 MCG tablet Take 100 mcg by mouth daily       simvastatin (ZOCOR) 40 MG tablet Take 40 mg by mouth nightly. No current facility-administered medications for this visit. Past Medical History:   Diagnosis Date    Anxiety     Depression     Dysphagia 2/20/2014    GERD (gastroesophageal reflux disease) 2/20/2014    Hyperlipidemia     Hypertension     Hypothyroidism     Osteoarthritis        Past Surgical History:   Procedure Laterality Date    APPENDECTOMY      CARPAL TUNNEL RELEASE Right 10/05/2016    RIGHT INDEX FINGER TRIGGER RELEASE RIGHT THUMB CARPAL METACARPAL ARTHROPLASTY WITH LIGAMENT RECONSTRUCTION    CHOLECYSTECTOMY, LAPAROSCOPIC      ENDOSCOPY, COLON, DIAGNOSTIC  3/3/2014    biopsy    HYSTERECTOMY      45 yrs ago     JOINT REPLACEMENT      bilateral knees    UPPER GASTROINTESTINAL ENDOSCOPY         Social History     Tobacco Use    Smoking status: Never Smoker    Smokeless tobacco: Never Used   Substance Use Topics    Alcohol use: Yes     Alcohol/week: 1.0 standard drinks     Types: 1 Glasses of wine per week     Comment: once a month, occasionally a glass of wine    Drug use: No        Functional Status: The patient is able to ambulate and perform activities of daily living without the use of an assistive device. ROS:    Constitutional: Denies fevers, chills    Neurologic: See HPI.     MSK: See HPI. Psychiatric: Denies sleep disturbance, anxiety, depression    Physical Exam:   Blood pressure 118/76, temperature 96.7 °F (35.9 °C), temperature source Temporal, height 5' 3\" (1.6 m), weight 138 lb (62.6 kg). PAIN: 0/10  GEN APPEARANCE: Pleasant, well developed, well nourished in no acute distress; Alert and Oriented; body habitus is average  PSYCH: Normal mood and affect   SKIN: No lesions grossly visible on bilateral shoulders  MSK: Very little muscle mass of BUE. Tenderness to palpation within the lateral upper arms bilaterally (within musculature). NEURO -   Strength:   R  L  Deltoid   5  5  Biceps   5  5  Triceps  5  5  Wrist Ext  5  5  Finger Abd  5  5    Sensory:  Intact to light touch in all upper extremity dermatomes. Reflexes:   R  L  Biceps   2 2  Triceps  2 2  BR   2 2     Babinski is downgoing bilaterally. No clonus or spasticity of BUE. Gait: Reciprocal pattern with no assistive device, nonantalgic, appropriate step length and toe clearance, appropriate speed, no Trendelenburg. Impression:   Marcello Kayser is a 81 y.o. female who presents with bilateral arm pain secondary to muscle pain. 1. Right arm pain    2. Left arm pain        Recommendations:  1. Discussion: I have discussed the natural history of the above diagnoses with her in detail, with more than 1/2 of the visit spent counseling her on the pathophysiology and treatment options. Also, encouraging formal exercise. 2. Activity Modification: Patient to continue being as physically active as possible while avoiding complete inactivity. No current restrictions placed. 3. Medications: Trial diclofenac 1% gel to bilateral upper extremities twice daily as needed. 4. Referrals: No surgical referral needed at this point. 5. Images: No additional imaging ordered today. 6. Labs: None today. 7. DME: None today. 8. Injection: None today. 9. Follow-up: 1 month.   At that time we will review progress with above interventions. The patient was educated about the diagnosis, prognosis, indications, risks and benefits of treatment. An opportunity to ask questions was given to the patient and questions were answered. The patient agreed to proceed with the recommended treatment as described above. Sincerely,     Dylon Wells., DO  Board Certified Physical Medicine and Rehabilitation     Please note that the above documentation was prepared using voice recognition software. Every attempt was made to ensure accuracy but there may be spelling, grammatical, and contextual errors.

## 2021-03-24 ENCOUNTER — TELEPHONE (OUTPATIENT)
Dept: PHYSICAL MEDICINE AND REHAB | Age: 87
End: 2021-03-24

## 2021-03-24 NOTE — TELEPHONE ENCOUNTER
Daughter requesting Liane's gabapentin refill. Next appointment in April 12th and she will be out by that time.

## 2021-04-07 ENCOUNTER — OFFICE VISIT (OUTPATIENT)
Dept: PHYSICAL MEDICINE AND REHAB | Age: 87
End: 2021-04-07
Payer: MEDICARE

## 2021-04-07 VITALS
WEIGHT: 140 LBS | BODY MASS INDEX: 25.76 KG/M2 | SYSTOLIC BLOOD PRESSURE: 116 MMHG | DIASTOLIC BLOOD PRESSURE: 76 MMHG | HEIGHT: 62 IN

## 2021-04-07 DIAGNOSIS — M54.2 CHRONIC NECK PAIN: ICD-10-CM

## 2021-04-07 DIAGNOSIS — M25.552 LEFT HIP PAIN: ICD-10-CM

## 2021-04-07 DIAGNOSIS — G89.29 CHRONIC NECK PAIN: ICD-10-CM

## 2021-04-07 DIAGNOSIS — G89.29 CHRONIC LEFT SHOULDER PAIN: ICD-10-CM

## 2021-04-07 DIAGNOSIS — M25.511 CHRONIC RIGHT SHOULDER PAIN: ICD-10-CM

## 2021-04-07 DIAGNOSIS — G89.29 CHRONIC RIGHT SHOULDER PAIN: ICD-10-CM

## 2021-04-07 DIAGNOSIS — M25.512 CHRONIC LEFT SHOULDER PAIN: ICD-10-CM

## 2021-04-07 PROCEDURE — 99213 OFFICE O/P EST LOW 20 MIN: CPT | Performed by: PHYSICAL MEDICINE & REHABILITATION

## 2021-04-07 RX ORDER — OMEPRAZOLE 40 MG/1
CAPSULE, DELAYED RELEASE ORAL DAILY
COMMUNITY
Start: 2021-01-08 | End: 2022-10-05 | Stop reason: SDUPTHER

## 2021-04-07 RX ORDER — BUPROPION HYDROCHLORIDE 150 MG/1
TABLET ORAL
COMMUNITY
Start: 2021-02-23 | End: 2022-09-21 | Stop reason: SDUPTHER

## 2021-04-07 RX ORDER — GABAPENTIN 100 MG/1
100 CAPSULE ORAL 3 TIMES DAILY
Qty: 90 CAPSULE | Refills: 5 | Status: SHIPPED
Start: 2021-04-07 | End: 2022-09-21 | Stop reason: SDUPTHER

## 2021-04-07 RX ORDER — DIPHENHYDRAMINE HCL 25 MG
25 TABLET ORAL EVERY 6 HOURS PRN
COMMUNITY

## 2021-04-07 NOTE — PROGRESS NOTES
Dylon Abraham Physical Medicine and Rehabilitation  [address] [facility], [address]  Phone: [phone]  Fax: [phone]    Follow Up Evaluation for Agustina Solomon  : 4/15/1926  MRN: [id number]  PCP: Joanie Favre, MD  REF: No ref. provider found  Date of visit: 2021  Last Visit: 1/15/21    As you know, this is a 81 y.o. female with pertinent past medical history of MAURICE, hypothyroidism, hypertension, hyperlipidemia, GERD, dysphagia, depression, anxiety. Chief Complaint   Patient presents with    Shoulder Pain     having pain when she lays on her right side in the shoulder area 5/10 pain -is currently using diclofenac gel and needs a refill on gabapentin       HPI:   Patient presents today in follow-up for several musculoskeletal complaints. Recall, on 11/3/2020, a right SAB steroid injection was performed which provided complete pain relief. Also recall, on 12/15/2020, an ultrasound-guided left AC joint steroid injection and ultrasound-guided left gluteal bursa steroid injection was performed which provided complete pain relief. She now presents with vague pain in the lateral humerus region bilaterally, R>>L, without any injury at onset. The patient is ran out of her gabapentin and her pain seems to be worse since she ran out. Pain began unknown time ago without inciting event. Location: Bilateral(R>>L) lateral humerus region  Quality: Achy pain  Severity: 5/10. Pain Alleviated by nothing. Aggravated by certain movement  Timing: intermittent  Sensation: No numbness or tingling     PRIOR INJURIES/TREATMENT:  Ice/Heat: Yes, temporary relief  Brace: No  Medications:               Currently: Acetaminophen 1000 mg PRN, gabapentin 300 mg qHS              Past: None  Physical Therapy: None  Injection: Cervical CHETAN 6 months ago, minimal benefit. Multiple injections as above which were helpful for different areas of her body.   Prior Surgery in location of by mouth nightly.  buPROPion (WELLBUTRIN XL) 150 MG extended release tablet TAKE 1 TABLET BY MOUTH ONCE DAILY      omeprazole (PRILOSEC) 40 MG delayed release capsule       doxycycline monohydrate (MONODOX) 100 MG capsule Take 1 capsule by mouth 2 times daily (Patient not taking: Reported on 4/7/2021) 20 capsule 0    ranitidine (ZANTAC) 300 MG tablet Take 300 mg by mouth nightly      Omega 3 1000 MG CAPS Take 1,000 mg by mouth 3 tabs nightly      Ascorbic Acid (VITAMIN C) 500 MG CAPS Take 1 tablet by mouth daily       Cholecalciferol (VITAMIN D-3) 5000 UNITS TABS Take 1,000 Units by mouth daily       b complex vitamins capsule Take 1 capsule by mouth daily       vitamin E 400 UNIT capsule Take 400 Units by mouth daily       vitamin B-12 (CYANOCOBALAMIN) 100 MCG tablet Take 250 mcg by mouth daily       amitriptyline (ELAVIL) 50 MG tablet Take 50 mg by mouth nightly.  citalopram (CELEXA) 20 MG tablet Take 20 mg by mouth daily. Instructed to take with sip water am of procedure       No current facility-administered medications for this visit. Past Medical History:   Diagnosis Date    Anxiety     Depression     Dysphagia 2/20/2014    GERD (gastroesophageal reflux disease) 2/20/2014    Hyperlipidemia     Hypertension     Hypothyroidism     Osteoarthritis        Past Surgical History:   Procedure Laterality Date    APPENDECTOMY      CARPAL TUNNEL RELEASE Right 10/05/2016    RIGHT INDEX FINGER TRIGGER RELEASE RIGHT THUMB CARPAL METACARPAL ARTHROPLASTY WITH LIGAMENT RECONSTRUCTION    CHOLECYSTECTOMY, LAPAROSCOPIC      ENDOSCOPY, COLON, DIAGNOSTIC  3/3/2014    biopsy    HYSTERECTOMY      45 yrs ago     JOINT REPLACEMENT      bilateral knees    UPPER GASTROINTESTINAL ENDOSCOPY         Social History     Tobacco Use    Smoking status: Never Smoker    Smokeless tobacco: Never Used   Substance Use Topics    Alcohol use:  Yes     Alcohol/week: 1.0 standard drinks     Types: 1 Glasses of wine per week     Comment: once a month, occasionally a glass of wine    Drug use: No        Functional Status: The patient is able to ambulate and perform activities of daily living without the use of an assistive device. ROS:    Constitutional: Denies fevers, chills    Neurologic: See HPI.     MSK: See HPI. Psychiatric: Denies sleep disturbance, anxiety, depression    Physical Exam:   Blood pressure 116/76, height 5' 2\" (1.575 m), weight 140 lb (63.5 kg). PAIN: 0/10  GEN APPEARANCE: Pleasant, well developed, well nourished in no acute distress; Alert and Oriented; body habitus is average  PSYCH: Normal mood and affect   SKIN: No lesions grossly visible on bilateral shoulders  MSK: Very little muscle mass of BUE. Tenderness to palpation within the lateral upper arms bilaterally (within musculature). NEURO -   Strength:   R  L  Deltoid   5  5  Biceps   5  5  Triceps  5  5  Wrist Ext  5  5  Finger Abd  5  5    Reflexes:   R  L  Biceps   2 2  Triceps  2 2  BR   2 2     Impression:   Marco Antonio Gupta is a 81 y.o. female who presents with bilateral arm pain secondary to muscle pain. 1. Chronic right shoulder pain    2. Chronic left shoulder pain    3. Chronic neck pain    4. Left hip pain        Recommendations:  1. Discussion: I have discussed the natural history of the above diagnoses with her in detail, with more than 1/2 of the visit spent counseling her on the pathophysiology and treatment options. Also, encouraging formal exercise. 2. Activity Modification: Patient to continue being as physically active as possible while avoiding complete inactivity. No current restrictions placed. 3. Medications: Refill gabapentin x6 months. 4. Referrals: No surgical referral needed at this point. 5. Images: No additional imaging ordered today. 6. Labs: None today. 7. DME: None today. 8. Injection: None today. 9. Follow-up: sooner than 6 months with one of my partners.   At that time we will review progress with above interventions. The patient was educated about the diagnosis, prognosis, indications, risks and benefits of treatment. An opportunity to ask questions was given to the patient and questions were answered. The patient agreed to proceed with the recommended treatment as described above. Sincerely,     Dylon Soler., DO  Board Certified Physical Medicine and Rehabilitation     Please note that the above documentation was prepared using voice recognition software. Every attempt was made to ensure accuracy but there may be spelling, grammatical, and contextual errors.

## 2021-07-28 LAB
ALBUMIN SERPL-MCNC: NORMAL G/DL
ALP BLD-CCNC: NORMAL U/L
ALT SERPL-CCNC: NORMAL U/L
ANION GAP SERPL CALCULATED.3IONS-SCNC: NORMAL MMOL/L
AST SERPL-CCNC: NORMAL U/L
AVERAGE GLUCOSE: 126
BILIRUB SERPL-MCNC: NORMAL MG/DL
BUN BLDV-MCNC: NORMAL MG/DL
CALCIUM SERPL-MCNC: NORMAL MG/DL
CHLORIDE BLD-SCNC: NORMAL MMOL/L
CHOLESTEROL, TOTAL: NORMAL
CHOLESTEROL/HDL RATIO: NORMAL
CO2: NORMAL
CREAT SERPL-MCNC: NORMAL MG/DL
GFR CALCULATED: NORMAL
GLUCOSE BLD-MCNC: NORMAL MG/DL
HBA1C MFR BLD: 6 %
HDLC SERPL-MCNC: NORMAL MG/DL
LDL CHOLESTEROL CALCULATED: NORMAL
NONHDLC SERPL-MCNC: NORMAL MG/DL
POTASSIUM SERPL-SCNC: NORMAL MMOL/L
SODIUM BLD-SCNC: NORMAL MMOL/L
TOTAL PROTEIN: NORMAL
TRIGL SERPL-MCNC: NORMAL MG/DL
VLDLC SERPL CALC-MCNC: NORMAL MG/DL

## 2021-10-13 LAB
ALBUMIN SERPL-MCNC: NORMAL G/DL
ALP BLD-CCNC: NORMAL U/L
ALT SERPL-CCNC: NORMAL U/L
ANION GAP SERPL CALCULATED.3IONS-SCNC: NORMAL MMOL/L
AST SERPL-CCNC: NORMAL U/L
AVERAGE GLUCOSE: 120
BILIRUB SERPL-MCNC: NORMAL MG/DL
BUN BLDV-MCNC: NORMAL MG/DL
CALCIUM SERPL-MCNC: NORMAL MG/DL
CHLORIDE BLD-SCNC: NORMAL MMOL/L
CHOLESTEROL, TOTAL: NORMAL
CHOLESTEROL/HDL RATIO: NORMAL
CO2: NORMAL
CREAT SERPL-MCNC: NORMAL MG/DL
GFR CALCULATED: NORMAL
GLUCOSE BLD-MCNC: NORMAL MG/DL
HBA1C MFR BLD: 5.8 %
HDLC SERPL-MCNC: NORMAL MG/DL
LDL CHOLESTEROL CALCULATED: NORMAL
NONHDLC SERPL-MCNC: NORMAL MG/DL
POTASSIUM SERPL-SCNC: NORMAL MMOL/L
SODIUM BLD-SCNC: NORMAL MMOL/L
TOTAL PROTEIN: NORMAL
TRIGL SERPL-MCNC: NORMAL MG/DL
VLDLC SERPL CALC-MCNC: NORMAL MG/DL

## 2021-10-26 LAB — MAMMOGRAPHY, EXTERNAL: NORMAL

## 2022-09-13 RX ORDER — ERGOCALCIFEROL (VITAMIN D2) 1250 MCG
50000 CAPSULE ORAL WEEKLY
COMMUNITY

## 2022-09-13 RX ORDER — MIRTAZAPINE 30 MG/1
30 TABLET, FILM COATED ORAL NIGHTLY
COMMUNITY
End: 2022-10-05 | Stop reason: SDUPTHER

## 2022-09-13 RX ORDER — FLUTICASONE PROPIONATE 50 MCG
1 SPRAY, SUSPENSION (ML) NASAL DAILY
COMMUNITY

## 2022-09-21 ENCOUNTER — OFFICE VISIT (OUTPATIENT)
Dept: PRIMARY CARE CLINIC | Age: 88
End: 2022-09-21
Payer: MEDICARE

## 2022-09-21 VITALS
DIASTOLIC BLOOD PRESSURE: 62 MMHG | BODY MASS INDEX: 27.05 KG/M2 | HEART RATE: 94 BPM | SYSTOLIC BLOOD PRESSURE: 112 MMHG | OXYGEN SATURATION: 96 % | TEMPERATURE: 97.3 F | WEIGHT: 147 LBS | HEIGHT: 62 IN

## 2022-09-21 DIAGNOSIS — M25.511 CHRONIC RIGHT SHOULDER PAIN: ICD-10-CM

## 2022-09-21 DIAGNOSIS — N28.9 RENAL INSUFFICIENCY: Primary | ICD-10-CM

## 2022-09-21 DIAGNOSIS — R13.19 ESOPHAGEAL DYSPHAGIA: ICD-10-CM

## 2022-09-21 DIAGNOSIS — M54.2 CHRONIC NECK PAIN: ICD-10-CM

## 2022-09-21 DIAGNOSIS — G89.29 CHRONIC RIGHT SHOULDER PAIN: ICD-10-CM

## 2022-09-21 DIAGNOSIS — K59.04 CHRONIC IDIOPATHIC CONSTIPATION: ICD-10-CM

## 2022-09-21 DIAGNOSIS — E78.5 SERUM LIPIDS HIGH: ICD-10-CM

## 2022-09-21 DIAGNOSIS — M25.512 CHRONIC LEFT SHOULDER PAIN: ICD-10-CM

## 2022-09-21 DIAGNOSIS — N61.0 NIPPLE INFLAMMATION: ICD-10-CM

## 2022-09-21 DIAGNOSIS — G89.29 CHRONIC LEFT SHOULDER PAIN: ICD-10-CM

## 2022-09-21 DIAGNOSIS — K21.9 GASTROESOPHAGEAL REFLUX DISEASE, UNSPECIFIED WHETHER ESOPHAGITIS PRESENT: ICD-10-CM

## 2022-09-21 DIAGNOSIS — N28.9 RENAL INSUFFICIENCY: ICD-10-CM

## 2022-09-21 DIAGNOSIS — M25.552 LEFT HIP PAIN: ICD-10-CM

## 2022-09-21 DIAGNOSIS — F41.9 ANXIETY: ICD-10-CM

## 2022-09-21 DIAGNOSIS — G89.29 CHRONIC NECK PAIN: ICD-10-CM

## 2022-09-21 DIAGNOSIS — I10 HYPERTENSION, UNSPECIFIED TYPE: ICD-10-CM

## 2022-09-21 DIAGNOSIS — E03.9 ACQUIRED HYPOTHYROIDISM: ICD-10-CM

## 2022-09-21 DIAGNOSIS — N64.4 MASTODYNIA: ICD-10-CM

## 2022-09-21 LAB
ALBUMIN SERPL-MCNC: 4.3 G/DL (ref 3.5–5.2)
ALP BLD-CCNC: 98 U/L (ref 35–104)
ALT SERPL-CCNC: 17 U/L (ref 0–32)
ANION GAP SERPL CALCULATED.3IONS-SCNC: 13 MMOL/L (ref 7–16)
AST SERPL-CCNC: 26 U/L (ref 0–31)
BILIRUB SERPL-MCNC: 0.3 MG/DL (ref 0–1.2)
BUN BLDV-MCNC: 18 MG/DL (ref 6–23)
CALCIUM SERPL-MCNC: 9.5 MG/DL (ref 8.6–10.2)
CHLORIDE BLD-SCNC: 95 MMOL/L (ref 98–107)
CO2: 25 MMOL/L (ref 22–29)
CREAT SERPL-MCNC: 1.2 MG/DL (ref 0.5–1)
GFR AFRICAN AMERICAN: 50
GFR NON-AFRICAN AMERICAN: 42 ML/MIN/1.73
GLUCOSE BLD-MCNC: 166 MG/DL (ref 74–99)
POTASSIUM SERPL-SCNC: 4.6 MMOL/L (ref 3.5–5)
SODIUM BLD-SCNC: 133 MMOL/L (ref 132–146)
TOTAL PROTEIN: 7.2 G/DL (ref 6.4–8.3)

## 2022-09-21 PROCEDURE — 99213 OFFICE O/P EST LOW 20 MIN: CPT | Performed by: INTERNAL MEDICINE

## 2022-09-21 PROCEDURE — 1123F ACP DISCUSS/DSCN MKR DOCD: CPT | Performed by: INTERNAL MEDICINE

## 2022-09-21 RX ORDER — GABAPENTIN 600 MG/1
1 TABLET ORAL 3 TIMES DAILY PRN
COMMUNITY
Start: 2022-07-27

## 2022-09-21 RX ORDER — MULTIVIT-MIN/IRON/FOLIC ACID/K 18-600-40
1 CAPSULE ORAL DAILY
Qty: 90 CAPSULE | Refills: 0 | Status: SHIPPED | OUTPATIENT
Start: 2022-09-21

## 2022-09-21 RX ORDER — ALPRAZOLAM 0.5 MG/1
0.5 TABLET ORAL NIGHTLY PRN
Qty: 90 TABLET | Refills: 0 | Status: SHIPPED | OUTPATIENT
Start: 2022-09-21 | End: 2022-12-20

## 2022-09-21 RX ORDER — METOPROLOL SUCCINATE 25 MG/1
25 TABLET, EXTENDED RELEASE ORAL DAILY
Qty: 30 TABLET | Refills: 0 | Status: SHIPPED | OUTPATIENT
Start: 2022-09-21

## 2022-09-21 RX ORDER — GABAPENTIN 100 MG/1
100 CAPSULE ORAL 3 TIMES DAILY
Qty: 90 CAPSULE | Refills: 5 | Status: SHIPPED | OUTPATIENT
Start: 2022-09-21 | End: 2023-03-20

## 2022-09-21 RX ORDER — VITAMIN B COMPLEX
1 CAPSULE ORAL DAILY
Qty: 90 CAPSULE | Refills: 0 | Status: SHIPPED | OUTPATIENT
Start: 2022-09-21

## 2022-09-21 RX ORDER — AMITRIPTYLINE HYDROCHLORIDE 50 MG/1
50 TABLET, FILM COATED ORAL NIGHTLY
Qty: 90 TABLET | Refills: 0 | Status: SHIPPED | OUTPATIENT
Start: 2022-09-21

## 2022-09-21 RX ORDER — BUPROPION HYDROCHLORIDE 150 MG/1
TABLET ORAL
Qty: 90 TABLET | Refills: 0 | Status: SHIPPED
Start: 2022-09-21 | End: 2022-10-28 | Stop reason: SDUPTHER

## 2022-09-21 SDOH — ECONOMIC STABILITY: FOOD INSECURITY: WITHIN THE PAST 12 MONTHS, THE FOOD YOU BOUGHT JUST DIDN'T LAST AND YOU DIDN'T HAVE MONEY TO GET MORE.: NEVER TRUE

## 2022-09-21 SDOH — ECONOMIC STABILITY: FOOD INSECURITY: WITHIN THE PAST 12 MONTHS, YOU WORRIED THAT YOUR FOOD WOULD RUN OUT BEFORE YOU GOT MONEY TO BUY MORE.: NEVER TRUE

## 2022-09-21 ASSESSMENT — ENCOUNTER SYMPTOMS
ABDOMINAL PAIN: 0
COLOR CHANGE: 0
ALLERGIC/IMMUNOLOGIC NEGATIVE: 1
CONSTIPATION: 0
NAUSEA: 0
RHINORRHEA: 0
BACK PAIN: 0
ABDOMINAL DISTENTION: 0
TROUBLE SWALLOWING: 0
SORE THROAT: 0
BLOOD IN STOOL: 0
SINUS PRESSURE: 0
VOMITING: 0
DIARRHEA: 0

## 2022-09-21 ASSESSMENT — PATIENT HEALTH QUESTIONNAIRE - PHQ9
SUM OF ALL RESPONSES TO PHQ QUESTIONS 1-9: 0
1. LITTLE INTEREST OR PLEASURE IN DOING THINGS: 0
SUM OF ALL RESPONSES TO PHQ9 QUESTIONS 1 & 2: 0
2. FEELING DOWN, DEPRESSED OR HOPELESS: 0
SUM OF ALL RESPONSES TO PHQ QUESTIONS 1-9: 0

## 2022-09-21 ASSESSMENT — SOCIAL DETERMINANTS OF HEALTH (SDOH): HOW HARD IS IT FOR YOU TO PAY FOR THE VERY BASICS LIKE FOOD, HOUSING, MEDICAL CARE, AND HEATING?: NOT HARD AT ALL

## 2022-09-21 NOTE — PROGRESS NOTES
[facility] presents today for follow up of HTN, IBS-C, Anxiety, Depression, GERD    Current Outpatient Medications   Medication Sig Dispense Refill    gabapentin (NEURONTIN) 100 MG capsule Take 1 capsule by mouth 3 times daily for 180 days. Intended supply: 90 days 90 capsule 5    metoprolol succinate (TOPROL XL) 25 MG extended release tablet Take 1 tablet by mouth daily Instructed to take with sip water am of procedure 30 tablet 0    ALPRAZolam (XANAX) 0.5 MG tablet Take 1 tablet by mouth nightly as needed for Sleep for up to 90 days. 90 tablet 0    amitriptyline (ELAVIL) 50 MG tablet Take 1 tablet by mouth nightly 90 tablet 0    Ascorbic Acid (VITAMIN C) 500 MG CAPS Take 1 tablet by mouth daily 90 capsule 0    b complex vitamins capsule Take 1 capsule by mouth daily 90 capsule 0    fluticasone (FLONASE) 50 MCG/ACT nasal spray 1 spray by Each Nostril route daily      diphenhydrAMINE (BENADRYL) 25 MG tablet Take 25 mg by mouth every 6 hours as needed for Itching      BIOTIN PO Take by mouth      diclofenac sodium (VOLTAREN) 1 % GEL Apply 2 g topically 2 times daily 350 g 1    Calcium-Vitamin D 500-125 MG-UNIT TABS Take 1 tablet by mouth daily       citalopram (CELEXA) 20 MG tablet Take 20 mg by mouth daily. Instructed to take with sip water am of procedure      levothyroxine (SYNTHROID) 100 MCG tablet Take 100 mcg by mouth daily       simvastatin (ZOCOR) 40 MG tablet Take 1 tablet by mouth nightly 90 tablet 0    buPROPion (WELLBUTRIN XL) 150 MG extended release tablet TAKE 1 TABLET BY MOUTH ONCE DAILY 90 tablet 0    losartan (COZAAR) 50 MG tablet Take 1 tablet by mouth daily 90 tablet 0    mirtazapine (REMERON) 30 MG tablet Take 1 tablet by mouth nightly 90 tablet 0    omeprazole (PRILOSEC) 40 MG delayed release capsule Take 1 capsule by mouth daily 90 capsule 0    gabapentin (NEURONTIN) 600 MG tablet Take 1 tablet by mouth 3 times daily as needed.       ergocalciferol (ERGOCALCIFEROL) 1.25 MG (08237 UT) capsule Take 50,000 Units by mouth once a week      raNITIdine (ZANTAC) 300 MG tablet Take 300 mg by mouth nightly (Patient not taking: Reported on 9/21/2022)       No current facility-administered medications for this visit. Past Medical History:   Diagnosis Date    Anxiety     Depression     DJD (degenerative joint disease)     Dysphagia 02/20/2014    GERD (gastroesophageal reflux disease) 02/20/2014    Hyperlipidemia     Hypertension     Hypothyroidism     Irritable bowel syndrome with constipation     Low vitamin D level     Osteoarthritis           Subjective:  Here with daughter. Pte refers she has been doing great, active, good appetite. Very socially involved. Sleeps well on meds. Episodes of chocking with foods keep getting more frequent. Had her esophagus stretched before, \"maybe needs stretched before\". No recent falls. Review of Systems   Constitutional:  Negative for activity change, appetite change and chills. HENT:  Negative for congestion, ear pain, mouth sores, postnasal drip, rhinorrhea, sinus pressure, sneezing, sore throat and trouble swallowing. Eyes:  Negative for visual disturbance. Cardiovascular:  Negative for chest pain, palpitations and leg swelling. Gastrointestinal:  Negative for abdominal distention, abdominal pain, blood in stool, constipation, diarrhea, nausea and vomiting. Endocrine: Negative for cold intolerance, heat intolerance, polydipsia and polyuria. Genitourinary:  Negative for difficulty urinating, dysuria, flank pain, frequency and urgency. Chocking with foods   Musculoskeletal:  Negative for arthralgias, back pain, gait problem, neck pain and neck stiffness. Skin: Negative. Negative for color change. Allergic/Immunologic: Negative. Neurological:  Negative for dizziness, tremors, speech difficulty, weakness, light-headedness and headaches. Hematological: Negative. Psychiatric/Behavioral: Negative.           Objective:  BP 112/62 (Site: Left Upper Arm, Position: Sitting, Cuff Size: Medium Adult)   Pulse 94   Temp 97.3 °F (36.3 °C)   Ht 5' 2\" (1.575 m)   Wt 147 lb (66.7 kg)   SpO2 96%   BMI 26.89 kg/m²      Physical Exam  HENT:      Head: Normocephalic. Right Ear: Tympanic membrane and external ear normal. There is no impacted cerumen. Left Ear: Tympanic membrane and external ear normal. There is no impacted cerumen. Nose: Nose normal.      Mouth/Throat:      Pharynx: Oropharynx is clear. No oropharyngeal exudate. Eyes:      Extraocular Movements: Extraocular movements intact. Conjunctiva/sclera: Conjunctivae normal.      Pupils: Pupils are equal, round, and reactive to light. Cardiovascular:      Rate and Rhythm: Normal rate and regular rhythm. Heart sounds: Murmur: brenton lsb and rt 2nd ics. No friction rub. No gallop. Pulmonary:      Effort: Pulmonary effort is normal.      Breath sounds: Normal breath sounds. Abdominal:      General: Bowel sounds are normal. There is no distension. Palpations: Abdomen is soft. There is no mass. Tenderness: There is no abdominal tenderness. There is no guarding or rebound. Musculoskeletal:         General: No swelling, tenderness or deformity. Normal range of motion. Cervical back: Normal range of motion and neck supple. No tenderness. Right lower leg: Right lower leg edema: 1 non pitting. Left lower leg: Left lower leg edema: 1 non pitting. Lymphadenopathy:      Cervical: No cervical adenopathy. Skin:     General: Skin is warm. Coloration: Skin is not pale. Findings: No rash. Neurological:      General: No focal deficit present. Mental Status: She is alert and oriented to person, place, and time. Assessment:  Luann Johns was seen today for follow-up.     Diagnoses and all orders for this visit:    Renal insufficiency  Comments:  recheck today, increase po fluids  Orders:  -     Cancel: Comprehensive Metabolic Take 1 tablet by mouth nightly  -     Ascorbic Acid (VITAMIN C) 500 MG CAPS; Take 1 tablet by mouth daily  -     b complex vitamins capsule;  Take 1 capsule by mouth daily  -     Discontinue: buPROPion (WELLBUTRIN XL) 150 MG extended release tablet; TAKE 1 TABLET BY MOUTH ONCE DAILY

## 2022-10-05 ENCOUNTER — TELEPHONE (OUTPATIENT)
Dept: PRIMARY CARE CLINIC | Age: 88
End: 2022-10-05

## 2022-10-05 RX ORDER — MIRTAZAPINE 30 MG/1
30 TABLET, FILM COATED ORAL NIGHTLY
Qty: 90 TABLET | Refills: 0 | Status: SHIPPED | OUTPATIENT
Start: 2022-10-05

## 2022-10-05 RX ORDER — OMEPRAZOLE 40 MG/1
40 CAPSULE, DELAYED RELEASE ORAL DAILY
Qty: 90 CAPSULE | Refills: 0 | Status: SHIPPED | OUTPATIENT
Start: 2022-10-05

## 2022-10-26 NOTE — TELEPHONE ENCOUNTER
Daughter called requesting refill on the below 2       [facility] [address]              buPROPion (WELLBUTRIN XL) 150 MG extended release tablet       losartan (COZAAR) 50 MG tablet

## 2022-10-28 RX ORDER — BUPROPION HYDROCHLORIDE 150 MG/1
TABLET ORAL
Qty: 90 TABLET | Refills: 0 | Status: SHIPPED | OUTPATIENT
Start: 2022-10-28

## 2022-10-28 RX ORDER — LOSARTAN POTASSIUM 50 MG/1
50 TABLET ORAL DAILY
Qty: 90 TABLET | Refills: 0 | Status: SHIPPED | OUTPATIENT
Start: 2022-10-28

## 2022-11-18 ENCOUNTER — TELEPHONE (OUTPATIENT)
Dept: PRIMARY CARE CLINIC | Age: 88
End: 2022-11-18

## 2022-11-18 DIAGNOSIS — Z12.31 SCREENING MAMMOGRAM FOR BREAST CANCER: Primary | ICD-10-CM

## 2022-11-18 NOTE — TELEPHONE ENCOUNTER
Received a call from Shannan Swan, daughter of Bonnie Dela Cruz requesting a referral for a mammogram be sent into [facility]. Thank you.

## 2022-11-28 DIAGNOSIS — N64.4 MASTODYNIA: ICD-10-CM

## 2022-11-28 DIAGNOSIS — N61.0 NIPPLE INFLAMMATION: ICD-10-CM

## 2022-11-28 RX ORDER — SIMVASTATIN 40 MG
40 TABLET ORAL NIGHTLY
Qty: 90 TABLET | Refills: 0 | Status: SHIPPED | OUTPATIENT
Start: 2022-11-28

## 2022-12-21 LAB — MAMMOGRAPHY, EXTERNAL: NORMAL

## 2022-12-27 ENCOUNTER — TELEPHONE (OUTPATIENT)
Dept: PRIMARY CARE CLINIC | Age: 88
End: 2022-12-27

## 2023-01-06 RX ORDER — OMEPRAZOLE 40 MG/1
40 CAPSULE, DELAYED RELEASE ORAL DAILY
Qty: 90 CAPSULE | Refills: 0 | Status: SHIPPED | OUTPATIENT
Start: 2023-01-06

## 2023-01-13 ENCOUNTER — TELEPHONE (OUTPATIENT)
Dept: PRIMARY CARE CLINIC | Age: 89
End: 2023-01-13

## 2023-01-13 DIAGNOSIS — N61.0 NIPPLE INFLAMMATION: ICD-10-CM

## 2023-01-13 DIAGNOSIS — N64.4 MASTODYNIA: ICD-10-CM

## 2023-01-16 RX ORDER — LOSARTAN POTASSIUM 50 MG/1
50 TABLET ORAL DAILY
Qty: 90 TABLET | Refills: 0 | Status: SHIPPED | OUTPATIENT
Start: 2023-01-16

## 2023-01-16 RX ORDER — LEVOTHYROXINE SODIUM 0.1 MG/1
100 TABLET ORAL DAILY
Qty: 90 TABLET | Refills: 0 | Status: SHIPPED | OUTPATIENT
Start: 2023-01-16

## 2023-01-25 ENCOUNTER — TELEPHONE (OUTPATIENT)
Dept: PRIMARY CARE CLINIC | Age: 89
End: 2023-01-25

## 2023-01-25 NOTE — TELEPHONE ENCOUNTER
Patients daughter requesting a 80 supply Fosamax and Lexapro to be sent into [facility] in [facility]. Thank you.

## 2023-02-08 DIAGNOSIS — M25.512 CHRONIC LEFT SHOULDER PAIN: ICD-10-CM

## 2023-02-08 DIAGNOSIS — M25.552 LEFT HIP PAIN: ICD-10-CM

## 2023-02-08 DIAGNOSIS — G89.29 CHRONIC RIGHT SHOULDER PAIN: ICD-10-CM

## 2023-02-08 DIAGNOSIS — M25.511 CHRONIC RIGHT SHOULDER PAIN: ICD-10-CM

## 2023-02-08 DIAGNOSIS — G89.29 CHRONIC NECK PAIN: ICD-10-CM

## 2023-02-08 DIAGNOSIS — M54.2 CHRONIC NECK PAIN: ICD-10-CM

## 2023-02-08 DIAGNOSIS — G89.29 CHRONIC LEFT SHOULDER PAIN: ICD-10-CM

## 2023-02-08 RX ORDER — BUPROPION HYDROCHLORIDE 150 MG/1
TABLET ORAL
Qty: 90 TABLET | Refills: 0 | Status: SHIPPED | OUTPATIENT
Start: 2023-02-08

## 2023-02-08 RX ORDER — GABAPENTIN 100 MG/1
100 CAPSULE ORAL 3 TIMES DAILY
Qty: 270 CAPSULE | Refills: 0 | Status: SHIPPED | OUTPATIENT
Start: 2023-02-08 | End: 2023-08-07

## 2023-02-22 ENCOUNTER — OFFICE VISIT (OUTPATIENT)
Dept: PRIMARY CARE CLINIC | Age: 89
End: 2023-02-22
Payer: MEDICARE

## 2023-02-22 VITALS
OXYGEN SATURATION: 98 % | TEMPERATURE: 97.1 F | BODY MASS INDEX: 26.43 KG/M2 | WEIGHT: 143.6 LBS | HEART RATE: 71 BPM | SYSTOLIC BLOOD PRESSURE: 130 MMHG | HEIGHT: 62 IN | RESPIRATION RATE: 16 BRPM | DIASTOLIC BLOOD PRESSURE: 70 MMHG

## 2023-02-22 DIAGNOSIS — G89.29 CHRONIC RIGHT SHOULDER PAIN: ICD-10-CM

## 2023-02-22 DIAGNOSIS — I10 HYPERTENSION, UNSPECIFIED TYPE: ICD-10-CM

## 2023-02-22 DIAGNOSIS — E78.5 SERUM LIPIDS HIGH: ICD-10-CM

## 2023-02-22 DIAGNOSIS — R13.10 DYSPHAGIA, UNSPECIFIED TYPE: ICD-10-CM

## 2023-02-22 DIAGNOSIS — M25.511 CHRONIC RIGHT SHOULDER PAIN: ICD-10-CM

## 2023-02-22 DIAGNOSIS — F41.9 ANXIETY: ICD-10-CM

## 2023-02-22 DIAGNOSIS — E78.5 SERUM LIPIDS HIGH: Primary | ICD-10-CM

## 2023-02-22 LAB
ALBUMIN SERPL-MCNC: 4.2 G/DL (ref 3.5–5.2)
ALP BLD-CCNC: 138 U/L (ref 35–104)
ALT SERPL-CCNC: 14 U/L (ref 0–32)
ANION GAP SERPL CALCULATED.3IONS-SCNC: 10 MMOL/L (ref 7–16)
AST SERPL-CCNC: 25 U/L (ref 0–31)
BILIRUB SERPL-MCNC: 0.3 MG/DL (ref 0–1.2)
BUN BLDV-MCNC: 17 MG/DL (ref 6–23)
CALCIUM SERPL-MCNC: 9.8 MG/DL (ref 8.6–10.2)
CHLORIDE BLD-SCNC: 98 MMOL/L (ref 98–107)
CHOLESTEROL, TOTAL: 173 MG/DL (ref 0–199)
CO2: 27 MMOL/L (ref 22–29)
CREAT SERPL-MCNC: 1.3 MG/DL (ref 0.5–1)
GFR SERPL CREATININE-BSD FRML MDRD: 38 ML/MIN/1.73
GLUCOSE FASTING: 118 MG/DL (ref 74–99)
HDLC SERPL-MCNC: 63 MG/DL
LDL CHOLESTEROL CALCULATED: 85 MG/DL (ref 0–99)
POTASSIUM SERPL-SCNC: 5.5 MMOL/L (ref 3.5–5)
SODIUM BLD-SCNC: 135 MMOL/L (ref 132–146)
SOURCE: NORMAL
TOTAL PROTEIN: 7.3 G/DL (ref 6.4–8.3)
TRIGL SERPL-MCNC: 126 MG/DL (ref 0–149)
VLDLC SERPL CALC-MCNC: 25 MG/DL

## 2023-02-22 PROCEDURE — 1123F ACP DISCUSS/DSCN MKR DOCD: CPT | Performed by: INTERNAL MEDICINE

## 2023-02-22 PROCEDURE — 99214 OFFICE O/P EST MOD 30 MIN: CPT | Performed by: INTERNAL MEDICINE

## 2023-02-22 RX ORDER — SIMVASTATIN 40 MG
40 TABLET ORAL NIGHTLY
Qty: 90 TABLET | Refills: 0 | Status: SHIPPED | OUTPATIENT
Start: 2023-02-22

## 2023-02-22 RX ORDER — MIRTAZAPINE 30 MG/1
30 TABLET, FILM COATED ORAL NIGHTLY
Qty: 90 TABLET | Refills: 0 | Status: SHIPPED | OUTPATIENT
Start: 2023-02-22

## 2023-02-22 SDOH — ECONOMIC STABILITY: FOOD INSECURITY: WITHIN THE PAST 12 MONTHS, THE FOOD YOU BOUGHT JUST DIDN'T LAST AND YOU DIDN'T HAVE MONEY TO GET MORE.: NEVER TRUE

## 2023-02-22 SDOH — ECONOMIC STABILITY: INCOME INSECURITY: HOW HARD IS IT FOR YOU TO PAY FOR THE VERY BASICS LIKE FOOD, HOUSING, MEDICAL CARE, AND HEATING?: NOT HARD AT ALL

## 2023-02-22 SDOH — ECONOMIC STABILITY: HOUSING INSECURITY
IN THE LAST 12 MONTHS, WAS THERE A TIME WHEN YOU DID NOT HAVE A STEADY PLACE TO SLEEP OR SLEPT IN A SHELTER (INCLUDING NOW)?: NO

## 2023-02-22 SDOH — ECONOMIC STABILITY: FOOD INSECURITY: WITHIN THE PAST 12 MONTHS, YOU WORRIED THAT YOUR FOOD WOULD RUN OUT BEFORE YOU GOT MONEY TO BUY MORE.: NEVER TRUE

## 2023-02-22 ASSESSMENT — ENCOUNTER SYMPTOMS
RHINORRHEA: 0
ALLERGIC/IMMUNOLOGIC NEGATIVE: 1
SINUS PRESSURE: 0
ABDOMINAL DISTENTION: 0
NAUSEA: 0
COLOR CHANGE: 0
SORE THROAT: 0
VOMITING: 0
DIARRHEA: 0
ABDOMINAL PAIN: 0
TROUBLE SWALLOWING: 0
BACK PAIN: 0
CONSTIPATION: 0
BLOOD IN STOOL: 0

## 2023-02-22 ASSESSMENT — PATIENT HEALTH QUESTIONNAIRE - PHQ9
SUM OF ALL RESPONSES TO PHQ QUESTIONS 1-9: 5
3. TROUBLE FALLING OR STAYING ASLEEP: 3
SUM OF ALL RESPONSES TO PHQ QUESTIONS 1-9: 5
4. FEELING TIRED OR HAVING LITTLE ENERGY: 1
7. TROUBLE CONCENTRATING ON THINGS, SUCH AS READING THE NEWSPAPER OR WATCHING TELEVISION: 0
2. FEELING DOWN, DEPRESSED OR HOPELESS: 1
5. POOR APPETITE OR OVEREATING: 0
SUM OF ALL RESPONSES TO PHQ9 QUESTIONS 1 & 2: 1
10. IF YOU CHECKED OFF ANY PROBLEMS, HOW DIFFICULT HAVE THESE PROBLEMS MADE IT FOR YOU TO DO YOUR WORK, TAKE CARE OF THINGS AT HOME, OR GET ALONG WITH OTHER PEOPLE: 1
SUM OF ALL RESPONSES TO PHQ QUESTIONS 1-9: 5
SUM OF ALL RESPONSES TO PHQ QUESTIONS 1-9: 5
8. MOVING OR SPEAKING SO SLOWLY THAT OTHER PEOPLE COULD HAVE NOTICED. OR THE OPPOSITE, BEING SO FIGETY OR RESTLESS THAT YOU HAVE BEEN MOVING AROUND A LOT MORE THAN USUAL: 0
9. THOUGHTS THAT YOU WOULD BE BETTER OFF DEAD, OR OF HURTING YOURSELF: 0
1. LITTLE INTEREST OR PLEASURE IN DOING THINGS: 0
6. FEELING BAD ABOUT YOURSELF - OR THAT YOU ARE A FAILURE OR HAVE LET YOURSELF OR YOUR FAMILY DOWN: 0

## 2023-02-22 NOTE — PROGRESS NOTES
Daya Dhaliwal presents today for follow up of HTN, High chol, DJD, Anxiety    Current Outpatient Medications   Medication Sig Dispense Refill    simvastatin (ZOCOR) 40 MG tablet Take 1 tablet by mouth nightly 90 tablet 0    mirtazapine (REMERON) 30 MG tablet Take 1 tablet by mouth nightly 90 tablet 0    diclofenac sodium (VOLTAREN) 1 % GEL Apply 2 g topically 2 times daily 350 g 1    buPROPion (WELLBUTRIN XL) 150 MG extended release tablet TAKE 1 TABLET BY MOUTH ONCE DAILY 90 tablet 0    gabapentin (NEURONTIN) 100 MG capsule Take 1 capsule by mouth 3 times daily for 180 days. Intended supply: 90 days (Patient taking differently: Take 100 mg by mouth 3 times daily. Intended supply: 90 days, 2/22/23 patient noted only BID) 270 capsule 0    levothyroxine (SYNTHROID) 100 MCG tablet Take 1 tablet by mouth daily 90 tablet 0    losartan (COZAAR) 50 MG tablet Take 1 tablet by mouth daily 90 tablet 0    omeprazole (PRILOSEC) 40 MG delayed release capsule Take 1 capsule by mouth daily 90 capsule 0    diphenhydrAMINE (BENADRYL) 25 MG tablet Take 25 mg by mouth every 6 hours as needed for Itching      BIOTIN PO Take by mouth      Calcium-Vitamin D 500-125 MG-UNIT TABS Take 1 tablet by mouth daily        No current facility-administered medications for this visit. Past Medical History:   Diagnosis Date    Anxiety     Depression     DJD (degenerative joint disease)     Dysphagia 02/20/2014    GERD (gastroesophageal reflux disease) 02/20/2014    Hyperlipidemia     Hypertension     Hypothyroidism     Irritable bowel syndrome with constipation     Low vitamin D level     Osteoarthritis           Subjective:  AS above. Continues to live by herself with supervision from daughters. Active, appetite is fair. As always, some difficulties falling asleep at night. Takes Melatonin and Benadryl. Shot given helped rt shoulder. But is starting to hurt again.      Otalgia   Pertinent negatives include no abdominal pain, diarrhea, headaches, neck pain, rhinorrhea, sore throat or vomiting. Other  Associated symptoms include arthralgias (rt shoulder). Pertinent negatives include no abdominal pain, chest pain, chills, congestion, headaches, nausea, neck pain, sore throat, vomiting or weakness. Shoulder Pain      Review of Systems   Constitutional:  Negative for activity change, appetite change and chills. HENT:  Negative for congestion, ear pain, mouth sores, postnasal drip, rhinorrhea, sinus pressure, sneezing, sore throat and trouble swallowing. Eyes:  Negative for visual disturbance. Cardiovascular:  Negative for chest pain, palpitations and leg swelling. Gastrointestinal:  Negative for abdominal distention, abdominal pain, blood in stool, constipation, diarrhea, nausea and vomiting. Dysphagia   Endocrine: Negative for cold intolerance, heat intolerance, polydipsia and polyuria. Genitourinary:  Negative for difficulty urinating, dysuria, flank pain, frequency and urgency. Musculoskeletal:  Positive for arthralgias (rt shoulder). Negative for back pain, gait problem, neck pain and neck stiffness. Skin: Negative. Negative for color change. Allergic/Immunologic: Negative. Neurological:  Negative for dizziness, tremors, speech difficulty, weakness, light-headedness and headaches. Hematological: Negative. Psychiatric/Behavioral: Negative. Objective:  /70 (Site: Right Upper Arm, Position: Sitting)   Pulse 71   Temp 97.1 °F (36.2 °C)   Resp 16   Ht 5' 2\" (1.575 m)   Wt 143 lb 9.6 oz (65.1 kg)   SpO2 98%   BMI 26.26 kg/m²      Physical Exam  Vitals reviewed. Constitutional:       Appearance: Normal appearance. Comments: Ambulates without devices, looks great for age   HENT:      Head: Normocephalic. Right Ear: Tympanic membrane and external ear normal. There is no impacted cerumen. Left Ear: Tympanic membrane and external ear normal. There is no impacted cerumen.       Nose: Nose normal.      Mouth/Throat:      Pharynx: Oropharynx is clear. No oropharyngeal exudate. Eyes:      Extraocular Movements: Extraocular movements intact. Conjunctiva/sclera: Conjunctivae normal.      Pupils: Pupils are equal, round, and reactive to light. Cardiovascular:      Rate and Rhythm: Normal rate and regular rhythm. Heart sounds: No murmur (semlsb and rt 2nd ics) heard. No friction rub. No gallop. Pulmonary:      Effort: Pulmonary effort is normal.      Breath sounds: Normal breath sounds. Abdominal:      General: Bowel sounds are normal. There is no distension. Palpations: Abdomen is soft. There is no mass. Tenderness: There is no abdominal tenderness. There is no guarding or rebound. Musculoskeletal:         General: No swelling, tenderness or deformity. Normal range of motion. Cervical back: Normal range of motion and neck supple. No tenderness. Comments: Limited rom rt shoulder   Lymphadenopathy:      Cervical: No cervical adenopathy. Skin:     General: Skin is warm. Coloration: Skin is not pale. Findings: No rash. Neurological:      General: No focal deficit present. Mental Status: She is alert and oriented to person, place, and time. Assessment:  Tian Cm was seen today for otalgia, other and shoulder pain. Diagnoses and all orders for this visit:    Serum lipids high  -     Comprehensive Metabolic Panel, Fasting; Future  -     LIPID PANEL; Future    Chronic right shoulder pain  Comments:  continue rom exercises at home,prn Tylenol    Hypertension, unspecified type  Comments:  Had stopped Metoprol since bp was dropping too much at home, now having high readings and some tachycardia, renewed at 12.5 qd    Anxiety  Comments:  Reassured, discussed coping mechanisms    Dysphagia, unspecified type  Comments:  Referred for possibele esophageal dilatation    Other orders  -     simvastatin (ZOCOR) 40 MG tablet;  Take 1 tablet by mouth nightly  -     mirtazapine (REMERON) 30 MG tablet; Take 1 tablet by mouth nightly  -     diclofenac sodium (VOLTAREN) 1 % GEL; Apply 2 g topically 2 times daily

## 2023-03-13 ENCOUNTER — TELEPHONE (OUTPATIENT)
Dept: PRIMARY CARE CLINIC | Age: 89
End: 2023-03-13

## 2023-03-13 DIAGNOSIS — H92.09 OTALGIA, UNSPECIFIED LATERALITY: Primary | ICD-10-CM

## 2023-04-07 DIAGNOSIS — N61.0 NIPPLE INFLAMMATION: ICD-10-CM

## 2023-04-07 DIAGNOSIS — N64.4 MASTODYNIA: ICD-10-CM

## 2023-04-07 RX ORDER — OMEPRAZOLE 40 MG/1
40 CAPSULE, DELAYED RELEASE ORAL DAILY
Qty: 90 CAPSULE | Refills: 0 | Status: SHIPPED | OUTPATIENT
Start: 2023-04-07

## 2023-04-07 RX ORDER — LOSARTAN POTASSIUM 50 MG/1
50 TABLET ORAL DAILY
Qty: 90 TABLET | Refills: 0 | Status: SHIPPED | OUTPATIENT
Start: 2023-04-07

## 2023-04-07 RX ORDER — LEVOTHYROXINE SODIUM 0.1 MG/1
100 TABLET ORAL DAILY
Qty: 90 TABLET | Refills: 0 | Status: SHIPPED | OUTPATIENT
Start: 2023-04-07

## 2023-04-07 RX ORDER — MIRTAZAPINE 30 MG/1
30 TABLET, FILM COATED ORAL NIGHTLY
Qty: 90 TABLET | Refills: 0 | Status: SHIPPED | OUTPATIENT
Start: 2023-04-07

## 2023-04-07 NOTE — TELEPHONE ENCOUNTER
Patients daughter is requesting refills on the following medications b sent into [facility] in [place]: losartan (COZAAR) 50 MG tablet , mirtazapine (REMERON) 30 MG tablet , levothyroxine (SYNTHROID) 100 MCG tablet,  omeprazole (PRILOSEC) 40 MG delayed release capsule , and daughter states Metoprolol 25 mg. Which I do not see on patients med list. Thank you.

## 2023-04-12 ENCOUNTER — APPOINTMENT (OUTPATIENT)
Dept: CT IMAGING | Age: 89
End: 2023-04-12
Payer: MEDICARE

## 2023-04-12 ENCOUNTER — APPOINTMENT (OUTPATIENT)
Dept: GENERAL RADIOLOGY | Age: 89
End: 2023-04-12
Payer: MEDICARE

## 2023-04-12 ENCOUNTER — HOSPITAL ENCOUNTER (EMERGENCY)
Age: 89
Discharge: ANOTHER HOSPITAL | End: 2023-04-13
Attending: STUDENT IN AN ORGANIZED HEALTH CARE EDUCATION/TRAINING PROGRAM | Admitting: INTERNAL MEDICINE
Payer: MEDICARE

## 2023-04-12 DIAGNOSIS — R55 NEAR SYNCOPE: ICD-10-CM
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
## 2023-04-12 DIAGNOSIS — H66.90 ACUTE OTITIS MEDIA, UNSPECIFIED OTITIS MEDIA TYPE: ICD-10-CM

## 2023-04-12 PROBLEM — R01.1 MURMUR: Status: ACTIVE | Noted: 2023-04-12

## 2023-04-12 LAB
ALBUMIN SERPL-MCNC: 4.1 G/DL (ref 3.5–5.2)
ALP SERPL-CCNC: 146 U/L (ref 35–104)
ALT SERPL-CCNC: 26 U/L (ref 0–32)
ANION GAP SERPL CALCULATED.3IONS-SCNC: 9 MMOL/L (ref 7–16)
AST SERPL-CCNC: 34 U/L (ref 0–31)
BASOPHILS # BLD: 0.05 E9/L (ref 0–0.2)
BASOPHILS NFR BLD: 0.7 % (ref 0–2)
BILIRUB SERPL-MCNC: 0.2 MG/DL (ref 0–1.2)
BUN SERPL-MCNC: 19 MG/DL (ref 6–23)
CALCIUM SERPL-MCNC: 9.4 MG/DL (ref 8.6–10.2)
CHLORIDE SERPL-SCNC: 96 MMOL/L (ref 98–107)
CO2 SERPL-SCNC: 29 MMOL/L (ref 22–29)
CREAT SERPL-MCNC: 1.3 MG/DL (ref 0.5–1)
EOSINOPHIL # BLD: 0.43 E9/L (ref 0.05–0.5)
EOSINOPHIL NFR BLD: 6.3 % (ref 0–6)
ERYTHROCYTE [DISTWIDTH] IN BLOOD BY AUTOMATED COUNT: 14.8 FL (ref 11.5–15)
GLUCOSE SERPL-MCNC: 109 MG/DL (ref 74–99)
HCT VFR BLD AUTO: 36.5 % (ref 34–48)
HGB BLD-MCNC: 12.3 G/DL (ref 11.5–15.5)
IMM GRANULOCYTES # BLD: 0.02 E9/L
IMM GRANULOCYTES NFR BLD: 0.3 % (ref 0–5)
LYMPHOCYTES # BLD: 1.31 E9/L (ref 1.5–4)
LYMPHOCYTES NFR BLD: 19.2 % (ref 20–42)
MCH RBC QN AUTO: 32.4 PG (ref 26–35)
MCHC RBC AUTO-ENTMCNC: 33.7 % (ref 32–34.5)
MCV RBC AUTO: 96.1 FL (ref 80–99.9)
MONOCYTES # BLD: 0.59 E9/L (ref 0.1–0.95)
MONOCYTES NFR BLD: 8.7 % (ref 2–12)
NEUTROPHILS # BLD: 4.42 E9/L (ref 1.8–7.3)
NEUTS SEG NFR BLD: 64.8 % (ref 43–80)
PLATELET # BLD AUTO: 224 E9/L (ref 130–450)
PMV BLD AUTO: 10.3 FL (ref 7–12)
POTASSIUM SERPL-SCNC: 4.5 MMOL/L (ref 3.5–5)
PROT SERPL-MCNC: 6.7 G/DL (ref 6.4–8.3)
RBC # BLD AUTO: 3.8 E12/L (ref 3.5–5.5)
SODIUM SERPL-SCNC: 134 MMOL/L (ref 132–146)
TROPONIN, HIGH SENSITIVITY: 18 NG/L (ref 0–9)
TROPONIN, HIGH SENSITIVITY: 20 NG/L (ref 0–9)
WBC # BLD: 6.8 E9/L (ref 4.5–11.5)

## 2023-04-12 PROCEDURE — 72125 CT NECK SPINE W/O DYE: CPT

## 2023-04-12 PROCEDURE — 36415 COLL VENOUS BLD VENIPUNCTURE: CPT

## 2023-04-12 PROCEDURE — 93005 ELECTROCARDIOGRAM TRACING: CPT | Performed by: STUDENT IN AN ORGANIZED HEALTH CARE EDUCATION/TRAINING PROGRAM

## 2023-04-12 PROCEDURE — 70450 CT HEAD/BRAIN W/O DYE: CPT

## 2023-04-12 PROCEDURE — 71045 X-RAY EXAM CHEST 1 VIEW: CPT

## 2023-04-12 PROCEDURE — 70486 CT MAXILLOFACIAL W/O DYE: CPT

## 2023-04-12 PROCEDURE — 80053 COMPREHEN METABOLIC PANEL: CPT

## 2023-04-12 PROCEDURE — 84484 ASSAY OF TROPONIN QUANT: CPT

## 2023-04-12 PROCEDURE — 99285 EMERGENCY DEPT VISIT HI MDM: CPT

## 2023-04-12 PROCEDURE — 6370000000 HC RX 637 (ALT 250 FOR IP): Performed by: STUDENT IN AN ORGANIZED HEALTH CARE EDUCATION/TRAINING PROGRAM

## 2023-04-12 PROCEDURE — 85025 COMPLETE CBC W/AUTO DIFF WBC: CPT

## 2023-04-12 RX ORDER — HEPARIN SODIUM 5000 [USP'U]/ML
5000 INJECTION, SOLUTION INTRAVENOUS; SUBCUTANEOUS EVERY 8 HOURS SCHEDULED
Status: CANCELLED | OUTPATIENT
Start: 2023-04-12

## 2023-04-12 RX ORDER — SODIUM CHLORIDE 0.9 % (FLUSH) 0.9 %
5-40 SYRINGE (ML) INJECTION PRN
Status: CANCELLED | OUTPATIENT
Start: 2023-04-12

## 2023-04-12 RX ORDER — LOSARTAN POTASSIUM 25 MG/1
50 TABLET ORAL DAILY
Status: CANCELLED | OUTPATIENT
Start: 2023-04-13

## 2023-04-12 RX ORDER — BISACODYL 10 MG
10 SUPPOSITORY, RECTAL RECTAL DAILY PRN
Status: CANCELLED | OUTPATIENT
Start: 2023-04-12

## 2023-04-12 RX ORDER — SIMVASTATIN 40 MG
40 TABLET ORAL NIGHTLY
Status: CANCELLED | OUTPATIENT
Start: 2023-04-12

## 2023-04-12 RX ORDER — SODIUM CHLORIDE 9 MG/ML
INJECTION, SOLUTION INTRAVENOUS CONTINUOUS
Status: CANCELLED | OUTPATIENT
Start: 2023-04-12

## 2023-04-12 RX ORDER — ACETAMINOPHEN 500 MG
1000 TABLET ORAL ONCE
Status: COMPLETED | OUTPATIENT
Start: 2023-04-12 | End: 2023-04-12

## 2023-04-12 RX ORDER — SODIUM CHLORIDE 0.9 % (FLUSH) 0.9 %
5-40 SYRINGE (ML) INJECTION EVERY 12 HOURS SCHEDULED
Status: CANCELLED | OUTPATIENT
Start: 2023-04-12

## 2023-04-12 RX ORDER — SODIUM CHLORIDE 9 MG/ML
INJECTION, SOLUTION INTRAVENOUS PRN
Status: CANCELLED | OUTPATIENT
Start: 2023-04-12

## 2023-04-12 RX ORDER — MIRTAZAPINE 15 MG/1
30 TABLET, FILM COATED ORAL NIGHTLY
Status: CANCELLED | OUTPATIENT
Start: 2023-04-12

## 2023-04-12 RX ORDER — OMEPRAZOLE 40 MG/1
40 CAPSULE, DELAYED RELEASE ORAL DAILY
Status: CANCELLED | OUTPATIENT
Start: 2023-04-13

## 2023-04-12 RX ORDER — LEVOTHYROXINE SODIUM 0.1 MG/1
100 TABLET ORAL DAILY
Status: CANCELLED | OUTPATIENT
Start: 2023-04-13

## 2023-04-12 RX ORDER — METOPROLOL TARTRATE 50 MG/1
25 TABLET, FILM COATED ORAL 2 TIMES DAILY
Status: CANCELLED | OUTPATIENT
Start: 2023-04-12

## 2023-04-12 RX ORDER — GABAPENTIN 100 MG/1
100 CAPSULE ORAL 3 TIMES DAILY
Status: CANCELLED | OUTPATIENT
Start: 2023-04-12

## 2023-04-12 RX ADMIN — ACETAMINOPHEN 1000 MG: 500 TABLET ORAL at 19:29

## 2023-04-12 ASSESSMENT — ENCOUNTER SYMPTOMS
VOMITING: 0
COUGH: 0
NAUSEA: 0
DIARRHEA: 0
SHORTNESS OF BREATH: 0
CHEST TIGHTNESS: 0
PHOTOPHOBIA: 0
ABDOMINAL DISTENTION: 0
ABDOMINAL PAIN: 0

## 2023-04-12 ASSESSMENT — PAIN SCALES - GENERAL
PAINLEVEL_OUTOF10: 4
PAINLEVEL_OUTOF10: 10

## 2023-04-12 ASSESSMENT — PAIN - FUNCTIONAL ASSESSMENT
PAIN_FUNCTIONAL_ASSESSMENT: NONE - DENIES PAIN
PAIN_FUNCTIONAL_ASSESSMENT: 0-10

## 2023-04-12 NOTE — ED PROVIDER NOTES
Alfredo Guerra is a 81-year-old female present emergency department with concern for fall. Patient is had multiple falls over the last several months. Patient denies dizziness however she feels very weak and feels very lethargic after falling to the ground. Patient has been having pain in her right ear that has been present for the last month. Patient denies dizziness. Patient denies sensation of focal weakness or feeling off balance. Patient denies chest pain, shortness of breath, nausea, vomiting, fever, chills patient does have history of left bundle branch block. Patient does not have a history of murmur per daughter's    The history is provided by the patient, medical records and a relative. Review of Systems   Constitutional:  Negative for chills, diaphoresis, fatigue and fever. Eyes:  Negative for photophobia and visual disturbance. Respiratory:  Negative for cough, chest tightness and shortness of breath. Cardiovascular:  Negative for chest pain, palpitations and leg swelling. Gastrointestinal:  Negative for abdominal distention, abdominal pain, diarrhea, nausea and vomiting. Genitourinary:  Negative for dysuria. Musculoskeletal:  Negative for neck pain and neck stiffness. Skin:  Negative for pallor and rash. Neurological:  Positive for weakness. Negative for headaches. Psychiatric/Behavioral:  Negative for confusion. Physical Exam  Vitals and nursing note reviewed. Constitutional:       General: She is not in acute distress. Appearance: Normal appearance. She is not ill-appearing. HENT:      Head: Normocephalic and atraumatic. Ears:      Comments: Right cloudy TM   Eyes:      General: No scleral icterus. Conjunctiva/sclera: Conjunctivae normal.      Pupils: Pupils are equal, round, and reactive to light. Cardiovascular:      Rate and Rhythm: Normal rate and regular rhythm. Heart sounds: Murmur heard.    Pulmonary:      Effort: Pulmonary effort is

## 2023-04-12 NOTE — ED NOTES
Daughter at bedside.   Daughter and patient informed that patient will need to collect urine sample     Preet Deras RN  04/12/23 1683

## 2023-04-13 ENCOUNTER — HOSPITAL ENCOUNTER (INPATIENT)
Age: 89
LOS: 1 days | Discharge: HOME | DRG: 307 | End: 2023-04-19
Attending: INTERNAL MEDICINE | Admitting: FAMILY MEDICINE
Payer: MEDICARE

## 2023-04-13 ENCOUNTER — APPOINTMENT (OUTPATIENT)
Dept: GENERAL RADIOLOGY | Age: 89
DRG: 307 | End: 2023-04-13
Attending: INTERNAL MEDICINE
Payer: MEDICARE

## 2023-04-13 VITALS
RESPIRATION RATE: 18 BRPM | SYSTOLIC BLOOD PRESSURE: 163 MMHG | BODY MASS INDEX: 25.34 KG/M2 | HEIGHT: 63 IN | WEIGHT: 143 LBS | DIASTOLIC BLOOD PRESSURE: 85 MMHG | HEART RATE: 60 BPM | TEMPERATURE: 98.6 F | OXYGEN SATURATION: 96 %

## 2023-04-13 DIAGNOSIS — R55 NEAR SYNCOPE: ICD-10-CM

## 2023-04-13 DIAGNOSIS — R55 SYNCOPE, UNSPECIFIED SYNCOPE TYPE: Primary | ICD-10-CM

## 2023-04-13 LAB
ALBUMIN SERPL-MCNC: 3.9 G/DL (ref 3.5–5.2)
ALP SERPL-CCNC: 129 U/L (ref 35–104)
ALT SERPL-CCNC: 20 U/L (ref 0–32)
ANION GAP SERPL CALCULATED.3IONS-SCNC: 9 MMOL/L (ref 7–16)
AST SERPL-CCNC: 27 U/L (ref 0–31)
BASOPHILS # BLD: 0.07 E9/L (ref 0–0.2)
BASOPHILS NFR BLD: 1.1 % (ref 0–2)
BILIRUB SERPL-MCNC: 0.3 MG/DL (ref 0–1.2)
BUN SERPL-MCNC: 17 MG/DL (ref 6–23)
CALCIUM SERPL-MCNC: 8.5 MG/DL (ref 8.6–10.2)
CHLORIDE SERPL-SCNC: 97 MMOL/L (ref 98–107)
CO2 SERPL-SCNC: 27 MMOL/L (ref 22–29)
CREAT SERPL-MCNC: 1 MG/DL (ref 0.5–1)
EKG ATRIAL RATE: 72 BPM
EKG P AXIS: 51 DEGREES
EKG P-R INTERVAL: 156 MS
EKG Q-T INTERVAL: 444 MS
EKG QRS DURATION: 128 MS
EKG QTC CALCULATION (BAZETT): 486 MS
EKG R AXIS: 0 DEGREES
EKG T AXIS: 81 DEGREES
EKG VENTRICULAR RATE: 72 BPM
EOSINOPHIL # BLD: 0.58 E9/L (ref 0.05–0.5)
EOSINOPHIL NFR BLD: 8.9 % (ref 0–6)
ERYTHROCYTE [DISTWIDTH] IN BLOOD BY AUTOMATED COUNT: 14.6 FL (ref 11.5–15)
GLUCOSE SERPL-MCNC: 97 MG/DL (ref 74–99)
HCT VFR BLD AUTO: 33.7 % (ref 34–48)
HGB BLD-MCNC: 11.1 G/DL (ref 11.5–15.5)
IMM GRANULOCYTES # BLD: 0.02 E9/L
IMM GRANULOCYTES NFR BLD: 0.3 % (ref 0–5)
LV EF: 60 %
LVEF MODALITY: NORMAL
LYMPHOCYTES # BLD: 1.71 E9/L (ref 1.5–4)
LYMPHOCYTES NFR BLD: 26.2 % (ref 20–42)
MCH RBC QN AUTO: 32 PG (ref 26–35)
MCHC RBC AUTO-ENTMCNC: 32.9 % (ref 32–34.5)
MCV RBC AUTO: 97.1 FL (ref 80–99.9)
MONOCYTES # BLD: 0.53 E9/L (ref 0.1–0.95)
MONOCYTES NFR BLD: 8.1 % (ref 2–12)
NEUTROPHILS # BLD: 3.62 E9/L (ref 1.8–7.3)
NEUTS SEG NFR BLD: 55.4 % (ref 43–80)
PLATELET # BLD AUTO: 177 E9/L (ref 130–450)
PMV BLD AUTO: 11.7 FL (ref 7–12)
POTASSIUM SERPL-SCNC: 4.2 MMOL/L (ref 3.5–5)
PROT SERPL-MCNC: 6.3 G/DL (ref 6.4–8.3)
RBC # BLD AUTO: 3.47 E12/L (ref 3.5–5.5)
SODIUM SERPL-SCNC: 133 MMOL/L (ref 132–146)
TROPONIN, HIGH SENSITIVITY: 21 NG/L (ref 0–9)
WBC # BLD: 6.5 E9/L (ref 4.5–11.5)

## 2023-04-13 PROCEDURE — 2580000003 HC RX 258: Performed by: NURSE PRACTITIONER

## 2023-04-13 PROCEDURE — 80053 COMPREHEN METABOLIC PANEL: CPT

## 2023-04-13 PROCEDURE — 93306 TTE W/DOPPLER COMPLETE: CPT

## 2023-04-13 PROCEDURE — 84484 ASSAY OF TROPONIN QUANT: CPT

## 2023-04-13 PROCEDURE — 6370000000 HC RX 637 (ALT 250 FOR IP): Performed by: NURSE PRACTITIONER

## 2023-04-13 PROCEDURE — 92526 ORAL FUNCTION THERAPY: CPT | Performed by: SPEECH-LANGUAGE PATHOLOGIST

## 2023-04-13 PROCEDURE — 6360000002 HC RX W HCPCS: Performed by: NURSE PRACTITIONER

## 2023-04-13 PROCEDURE — 96372 THER/PROPH/DIAG INJ SC/IM: CPT

## 2023-04-13 PROCEDURE — 92610 EVALUATE SWALLOWING FUNCTION: CPT | Performed by: SPEECH-LANGUAGE PATHOLOGIST

## 2023-04-13 PROCEDURE — G0378 HOSPITAL OBSERVATION PER HR: HCPCS

## 2023-04-13 PROCEDURE — 36415 COLL VENOUS BLD VENIPUNCTURE: CPT

## 2023-04-13 PROCEDURE — 6370000000 HC RX 637 (ALT 250 FOR IP): Performed by: INTERNAL MEDICINE

## 2023-04-13 PROCEDURE — 6360000002 HC RX W HCPCS: Performed by: INTERNAL MEDICINE

## 2023-04-13 PROCEDURE — 93010 ELECTROCARDIOGRAM REPORT: CPT | Performed by: INTERNAL MEDICINE

## 2023-04-13 PROCEDURE — 85025 COMPLETE CBC W/AUTO DIFF WBC: CPT

## 2023-04-13 PROCEDURE — G0379 DIRECT REFER HOSPITAL OBSERV: HCPCS

## 2023-04-13 PROCEDURE — 73030 X-RAY EXAM OF SHOULDER: CPT

## 2023-04-13 RX ORDER — HEPARIN SODIUM 10000 [USP'U]/ML
5000 INJECTION, SOLUTION INTRAVENOUS; SUBCUTANEOUS EVERY 8 HOURS SCHEDULED
Status: DISCONTINUED | OUTPATIENT
Start: 2023-04-13 | End: 2023-04-19 | Stop reason: HOSPADM

## 2023-04-13 RX ORDER — MIRTAZAPINE 15 MG/1
30 TABLET, FILM COATED ORAL NIGHTLY
Status: DISCONTINUED | OUTPATIENT
Start: 2023-04-13 | End: 2023-04-19 | Stop reason: HOSPADM

## 2023-04-13 RX ORDER — SIMVASTATIN 40 MG
40 TABLET ORAL NIGHTLY
Status: DISCONTINUED | OUTPATIENT
Start: 2023-04-13 | End: 2023-04-13 | Stop reason: CLARIF

## 2023-04-13 RX ORDER — BUPROPION HYDROCHLORIDE 150 MG/1
150 TABLET ORAL EVERY EVENING
Status: DISCONTINUED | OUTPATIENT
Start: 2023-04-13 | End: 2023-04-13

## 2023-04-13 RX ORDER — GABAPENTIN 100 MG/1
100 CAPSULE ORAL 2 TIMES DAILY
Status: DISCONTINUED | OUTPATIENT
Start: 2023-04-13 | End: 2023-04-19 | Stop reason: HOSPADM

## 2023-04-13 RX ORDER — DEXAMETHASONE SODIUM PHOSPHATE 10 MG/ML
4 INJECTION INTRAMUSCULAR; INTRAVENOUS DAILY
Status: DISCONTINUED | OUTPATIENT
Start: 2023-04-13 | End: 2023-04-15

## 2023-04-13 RX ORDER — SODIUM CHLORIDE 0.9 % (FLUSH) 0.9 %
5-40 SYRINGE (ML) INJECTION PRN
Status: DISCONTINUED | OUTPATIENT
Start: 2023-04-13 | End: 2023-04-19 | Stop reason: HOSPADM

## 2023-04-13 RX ORDER — ACETAMINOPHEN 325 MG/1
650 TABLET ORAL EVERY 6 HOURS PRN
Status: DISCONTINUED | OUTPATIENT
Start: 2023-04-13 | End: 2023-04-19 | Stop reason: HOSPADM

## 2023-04-13 RX ORDER — OYSTER SHELL CALCIUM WITH VITAMIN D 500; 200 MG/1; [IU]/1
TABLET, FILM COATED ORAL DAILY
Status: DISCONTINUED | OUTPATIENT
Start: 2023-04-13 | End: 2023-04-13 | Stop reason: CLARIF

## 2023-04-13 RX ORDER — PANTOPRAZOLE SODIUM 40 MG/1
40 TABLET, DELAYED RELEASE ORAL
Status: DISCONTINUED | OUTPATIENT
Start: 2023-04-13 | End: 2023-04-19 | Stop reason: HOSPADM

## 2023-04-13 RX ORDER — SODIUM CHLORIDE 0.9 % (FLUSH) 0.9 %
5-40 SYRINGE (ML) INJECTION EVERY 12 HOURS SCHEDULED
Status: DISCONTINUED | OUTPATIENT
Start: 2023-04-13 | End: 2023-04-19 | Stop reason: HOSPADM

## 2023-04-13 RX ORDER — SODIUM CHLORIDE 9 MG/ML
INJECTION, SOLUTION INTRAVENOUS PRN
Status: DISCONTINUED | OUTPATIENT
Start: 2023-04-13 | End: 2023-04-19 | Stop reason: HOSPADM

## 2023-04-13 RX ORDER — GABAPENTIN 100 MG/1
100 CAPSULE ORAL 3 TIMES DAILY
Status: DISCONTINUED | OUTPATIENT
Start: 2023-04-13 | End: 2023-04-13

## 2023-04-13 RX ORDER — OMEPRAZOLE 20 MG/1
40 CAPSULE, DELAYED RELEASE ORAL DAILY
Status: DISCONTINUED | OUTPATIENT
Start: 2023-04-13 | End: 2023-04-13 | Stop reason: CLARIF

## 2023-04-13 RX ORDER — SODIUM CHLORIDE 9 MG/ML
INJECTION, SOLUTION INTRAVENOUS CONTINUOUS
Status: DISCONTINUED | OUTPATIENT
Start: 2023-04-13 | End: 2023-04-14

## 2023-04-13 RX ORDER — POLYETHYLENE GLYCOL 3350 17 G/17G
17 POWDER, FOR SOLUTION ORAL DAILY
Status: DISCONTINUED | OUTPATIENT
Start: 2023-04-13 | End: 2023-04-19 | Stop reason: HOSPADM

## 2023-04-13 RX ORDER — BISACODYL 10 MG
10 SUPPOSITORY, RECTAL RECTAL DAILY PRN
Status: DISCONTINUED | OUTPATIENT
Start: 2023-04-13 | End: 2023-04-19 | Stop reason: HOSPADM

## 2023-04-13 RX ORDER — BUPROPION HYDROCHLORIDE 150 MG/1
150 TABLET ORAL DAILY
Status: DISCONTINUED | OUTPATIENT
Start: 2023-04-14 | End: 2023-04-19 | Stop reason: HOSPADM

## 2023-04-13 RX ORDER — LEVOTHYROXINE SODIUM 0.1 MG/1
100 TABLET ORAL DAILY
Status: DISCONTINUED | OUTPATIENT
Start: 2023-04-13 | End: 2023-04-19 | Stop reason: HOSPADM

## 2023-04-13 RX ORDER — ATORVASTATIN CALCIUM 20 MG/1
20 TABLET, FILM COATED ORAL NIGHTLY
Status: DISCONTINUED | OUTPATIENT
Start: 2023-04-13 | End: 2023-04-19 | Stop reason: HOSPADM

## 2023-04-13 RX ORDER — LOSARTAN POTASSIUM 50 MG/1
50 TABLET ORAL DAILY
Status: DISCONTINUED | OUTPATIENT
Start: 2023-04-13 | End: 2023-04-19

## 2023-04-13 RX ADMIN — METOPROLOL TARTRATE 25 MG: 25 TABLET, FILM COATED ORAL at 10:44

## 2023-04-13 RX ADMIN — GABAPENTIN 100 MG: 100 CAPSULE ORAL at 20:20

## 2023-04-13 RX ADMIN — LOSARTAN POTASSIUM 50 MG: 50 TABLET, FILM COATED ORAL at 10:44

## 2023-04-13 RX ADMIN — ACETAMINOPHEN 650 MG: 325 TABLET ORAL at 06:33

## 2023-04-13 RX ADMIN — ACETAMINOPHEN 650 MG: 325 TABLET ORAL at 18:24

## 2023-04-13 RX ADMIN — Medication 10 ML: at 10:49

## 2023-04-13 RX ADMIN — METOPROLOL TARTRATE 12.5 MG: 25 TABLET, FILM COATED ORAL at 20:20

## 2023-04-13 RX ADMIN — LEVOTHYROXINE SODIUM 100 MCG: 100 TABLET ORAL at 06:33

## 2023-04-13 RX ADMIN — HEPARIN SODIUM 5000 UNITS: 10000 INJECTION INTRAVENOUS; SUBCUTANEOUS at 06:33

## 2023-04-13 RX ADMIN — POLYETHYLENE GLYCOL 3350 17 G: 17 POWDER, FOR SOLUTION ORAL at 10:44

## 2023-04-13 RX ADMIN — GABAPENTIN 100 MG: 100 CAPSULE ORAL at 13:58

## 2023-04-13 RX ADMIN — ATORVASTATIN CALCIUM 20 MG: 20 TABLET, FILM COATED ORAL at 20:20

## 2023-04-13 RX ADMIN — PANTOPRAZOLE SODIUM 40 MG: 40 TABLET, DELAYED RELEASE ORAL at 06:33

## 2023-04-13 RX ADMIN — HEPARIN SODIUM 5000 UNITS: 10000 INJECTION INTRAVENOUS; SUBCUTANEOUS at 13:58

## 2023-04-13 RX ADMIN — CALCIUM CARBONATE-VITAMIN D TAB 500 MG-200 UNIT 1 TABLET: 500-200 TAB at 13:58

## 2023-04-13 RX ADMIN — HEPARIN SODIUM 5000 UNITS: 10000 INJECTION INTRAVENOUS; SUBCUTANEOUS at 20:20

## 2023-04-13 RX ADMIN — GABAPENTIN 100 MG: 100 CAPSULE ORAL at 10:44

## 2023-04-13 RX ADMIN — SODIUM CHLORIDE: 9 INJECTION, SOLUTION INTRAVENOUS at 03:50

## 2023-04-13 RX ADMIN — MIRTAZAPINE 30 MG: 15 TABLET, FILM COATED ORAL at 20:20

## 2023-04-13 RX ADMIN — DICLOFENAC SODIUM 2 G: 10 GEL TOPICAL at 21:31

## 2023-04-13 RX ADMIN — DEXAMETHASONE SODIUM PHOSPHATE 4 MG: 10 INJECTION INTRAMUSCULAR; INTRAVENOUS at 18:27

## 2023-04-13 ASSESSMENT — PAIN DESCRIPTION - ORIENTATION
ORIENTATION: RIGHT
ORIENTATION: RIGHT

## 2023-04-13 ASSESSMENT — PAIN DESCRIPTION - DESCRIPTORS
DESCRIPTORS: THROBBING
DESCRIPTORS: ACHING

## 2023-04-13 ASSESSMENT — PAIN SCALES - GENERAL
PAINLEVEL_OUTOF10: 0
PAINLEVEL_OUTOF10: 8
PAINLEVEL_OUTOF10: 0
PAINLEVEL_OUTOF10: 0
PAINLEVEL_OUTOF10: 3

## 2023-04-13 ASSESSMENT — PAIN DESCRIPTION - LOCATION
LOCATION: SHOULDER
LOCATION: SHOULDER

## 2023-04-13 ASSESSMENT — PAIN - FUNCTIONAL ASSESSMENT: PAIN_FUNCTIONAL_ASSESSMENT: PREVENTS OR INTERFERES SOME ACTIVE ACTIVITIES AND ADLS

## 2023-04-13 NOTE — ED NOTES
I spoke with [place] and they are calling [facility] for transport to [place].      Chun Hayward, [place]  04/12/23 [id number]

## 2023-04-14 ENCOUNTER — APPOINTMENT (OUTPATIENT)
Dept: GENERAL RADIOLOGY | Age: 89
DRG: 307 | End: 2023-04-14
Attending: INTERNAL MEDICINE
Payer: MEDICARE

## 2023-04-14 LAB
ANION GAP SERPL CALCULATED.3IONS-SCNC: 9 MMOL/L (ref 7–16)
BASOPHILS # BLD: 0.02 E9/L (ref 0–0.2)
BASOPHILS NFR BLD: 0.3 % (ref 0–2)
BUN SERPL-MCNC: 15 MG/DL (ref 6–23)
CALCIUM SERPL-MCNC: 8.8 MG/DL (ref 8.6–10.2)
CHLORIDE SERPL-SCNC: 98 MMOL/L (ref 98–107)
CO2 SERPL-SCNC: 27 MMOL/L (ref 22–29)
CREAT SERPL-MCNC: 1 MG/DL (ref 0.5–1)
EOSINOPHIL # BLD: 0.02 E9/L (ref 0.05–0.5)
EOSINOPHIL NFR BLD: 0.3 % (ref 0–6)
ERYTHROCYTE [DISTWIDTH] IN BLOOD BY AUTOMATED COUNT: 14.5 FL (ref 11.5–15)
GLUCOSE SERPL-MCNC: 154 MG/DL (ref 74–99)
HCT VFR BLD AUTO: 35 % (ref 34–48)
HGB BLD-MCNC: 11.4 G/DL (ref 11.5–15.5)
IMM GRANULOCYTES # BLD: 0.02 E9/L
IMM GRANULOCYTES NFR BLD: 0.3 % (ref 0–5)
LYMPHOCYTES # BLD: 1.02 E9/L (ref 1.5–4)
LYMPHOCYTES NFR BLD: 15.5 % (ref 20–42)
MCH RBC QN AUTO: 31.6 PG (ref 26–35)
MCHC RBC AUTO-ENTMCNC: 32.6 % (ref 32–34.5)
MCV RBC AUTO: 97 FL (ref 80–99.9)
MONOCYTES # BLD: 0.16 E9/L (ref 0.1–0.95)
MONOCYTES NFR BLD: 2.4 % (ref 2–12)
NEUTROPHILS # BLD: 5.35 E9/L (ref 1.8–7.3)
NEUTS SEG NFR BLD: 81.2 % (ref 43–80)
PLATELET # BLD AUTO: 157 E9/L (ref 130–450)
PMV BLD AUTO: 12.2 FL (ref 7–12)
POTASSIUM SERPL-SCNC: 4.4 MMOL/L (ref 3.5–5)
RBC # BLD AUTO: 3.61 E12/L (ref 3.5–5.5)
SODIUM SERPL-SCNC: 134 MMOL/L (ref 132–146)
TSH SERPL-MCNC: 6.11 UIU/ML (ref 0.27–4.2)
WBC # BLD: 6.6 E9/L (ref 4.5–11.5)

## 2023-04-14 PROCEDURE — 96372 THER/PROPH/DIAG INJ SC/IM: CPT

## 2023-04-14 PROCEDURE — 6370000000 HC RX 637 (ALT 250 FOR IP): Performed by: NURSE PRACTITIONER

## 2023-04-14 PROCEDURE — 84443 ASSAY THYROID STIM HORMONE: CPT

## 2023-04-14 PROCEDURE — 36415 COLL VENOUS BLD VENIPUNCTURE: CPT

## 2023-04-14 PROCEDURE — 6360000002 HC RX W HCPCS: Performed by: NURSE PRACTITIONER

## 2023-04-14 PROCEDURE — G0378 HOSPITAL OBSERVATION PER HR: HCPCS

## 2023-04-14 PROCEDURE — 6370000000 HC RX 637 (ALT 250 FOR IP): Performed by: INTERNAL MEDICINE

## 2023-04-14 PROCEDURE — 2500000003 HC RX 250 WO HCPCS: Performed by: NURSE PRACTITIONER

## 2023-04-14 PROCEDURE — 74220 X-RAY XM ESOPHAGUS 1CNTRST: CPT

## 2023-04-14 PROCEDURE — 6360000002 HC RX W HCPCS: Performed by: INTERNAL MEDICINE

## 2023-04-14 PROCEDURE — 97161 PT EVAL LOW COMPLEX 20 MIN: CPT

## 2023-04-14 PROCEDURE — 85025 COMPLETE CBC W/AUTO DIFF WBC: CPT

## 2023-04-14 PROCEDURE — 2580000003 HC RX 258: Performed by: NURSE PRACTITIONER

## 2023-04-14 PROCEDURE — 80048 BASIC METABOLIC PNL TOTAL CA: CPT

## 2023-04-14 PROCEDURE — 92526 ORAL FUNCTION THERAPY: CPT | Performed by: SPEECH-LANGUAGE PATHOLOGIST

## 2023-04-14 PROCEDURE — 97165 OT EVAL LOW COMPLEX 30 MIN: CPT

## 2023-04-14 RX ORDER — HYOSCYAMINE SULFATE 0.125 MG
125 TABLET ORAL 2 TIMES DAILY WITH MEALS
Status: DISCONTINUED | OUTPATIENT
Start: 2023-04-14 | End: 2023-04-18

## 2023-04-14 RX ORDER — DOCUSATE SODIUM 100 MG/1
100 CAPSULE, LIQUID FILLED ORAL 2 TIMES DAILY
Status: DISCONTINUED | OUTPATIENT
Start: 2023-04-14 | End: 2023-04-19 | Stop reason: HOSPADM

## 2023-04-14 RX ORDER — BISACODYL 10 MG
10 SUPPOSITORY, RECTAL RECTAL ONCE
Status: COMPLETED | OUTPATIENT
Start: 2023-04-14 | End: 2023-04-14

## 2023-04-14 RX ADMIN — DICLOFENAC SODIUM 2 G: 10 GEL TOPICAL at 20:38

## 2023-04-14 RX ADMIN — MIRTAZAPINE 30 MG: 15 TABLET, FILM COATED ORAL at 20:31

## 2023-04-14 RX ADMIN — ATORVASTATIN CALCIUM 20 MG: 20 TABLET, FILM COATED ORAL at 20:31

## 2023-04-14 RX ADMIN — BISACODYL 10 MG: 10 SUPPOSITORY RECTAL at 16:49

## 2023-04-14 RX ADMIN — BUPROPION HYDROCHLORIDE 150 MG: 150 TABLET, EXTENDED RELEASE ORAL at 09:23

## 2023-04-14 RX ADMIN — HEPARIN SODIUM 5000 UNITS: 10000 INJECTION INTRAVENOUS; SUBCUTANEOUS at 20:47

## 2023-04-14 RX ADMIN — CALCIUM CARBONATE-VITAMIN D TAB 500 MG-200 UNIT 1 TABLET: 500-200 TAB at 09:23

## 2023-04-14 RX ADMIN — POLYETHYLENE GLYCOL 3350 17 G: 17 POWDER, FOR SOLUTION ORAL at 09:23

## 2023-04-14 RX ADMIN — GABAPENTIN 100 MG: 100 CAPSULE ORAL at 20:32

## 2023-04-14 RX ADMIN — LOSARTAN POTASSIUM 50 MG: 50 TABLET, FILM COATED ORAL at 09:23

## 2023-04-14 RX ADMIN — DICLOFENAC SODIUM 2 G: 10 GEL TOPICAL at 09:23

## 2023-04-14 RX ADMIN — GABAPENTIN 100 MG: 100 CAPSULE ORAL at 09:23

## 2023-04-14 RX ADMIN — Medication 10 ML: at 09:23

## 2023-04-14 RX ADMIN — BARIUM SULFATE 140 ML: 980 POWDER, FOR SUSPENSION ORAL at 15:59

## 2023-04-14 RX ADMIN — LEVOTHYROXINE SODIUM 100 MCG: 100 TABLET ORAL at 06:36

## 2023-04-14 RX ADMIN — HYOSCYAMINE SULFATE 125 MCG: 0.12 TABLET ORAL at 16:49

## 2023-04-14 RX ADMIN — HEPARIN SODIUM 5000 UNITS: 10000 INJECTION INTRAVENOUS; SUBCUTANEOUS at 06:36

## 2023-04-14 RX ADMIN — HEPARIN SODIUM 5000 UNITS: 10000 INJECTION INTRAVENOUS; SUBCUTANEOUS at 13:45

## 2023-04-14 RX ADMIN — Medication 10 ML: at 20:39

## 2023-04-14 RX ADMIN — DOCUSATE SODIUM 100 MG: 100 CAPSULE, LIQUID FILLED ORAL at 20:32

## 2023-04-14 RX ADMIN — PANTOPRAZOLE SODIUM 40 MG: 40 TABLET, DELAYED RELEASE ORAL at 06:36

## 2023-04-14 RX ADMIN — METOPROLOL TARTRATE 12.5 MG: 25 TABLET, FILM COATED ORAL at 20:31

## 2023-04-14 RX ADMIN — DEXAMETHASONE SODIUM PHOSPHATE 4 MG: 10 INJECTION INTRAMUSCULAR; INTRAVENOUS at 09:23

## 2023-04-14 ASSESSMENT — PAIN SCALES - GENERAL
PAINLEVEL_OUTOF10: 0
PAINLEVEL_OUTOF10: 0

## 2023-04-15 PROCEDURE — 2580000003 HC RX 258: Performed by: NURSE PRACTITIONER

## 2023-04-15 PROCEDURE — 6370000000 HC RX 637 (ALT 250 FOR IP): Performed by: INTERNAL MEDICINE

## 2023-04-15 PROCEDURE — 6370000000 HC RX 637 (ALT 250 FOR IP): Performed by: NURSE PRACTITIONER

## 2023-04-15 PROCEDURE — 96372 THER/PROPH/DIAG INJ SC/IM: CPT

## 2023-04-15 PROCEDURE — G0378 HOSPITAL OBSERVATION PER HR: HCPCS

## 2023-04-15 PROCEDURE — 6360000002 HC RX W HCPCS: Performed by: INTERNAL MEDICINE

## 2023-04-15 PROCEDURE — 6360000002 HC RX W HCPCS: Performed by: NURSE PRACTITIONER

## 2023-04-15 RX ORDER — LACTULOSE 10 G/15ML
30 SOLUTION ORAL ONCE
Status: COMPLETED | OUTPATIENT
Start: 2023-04-15 | End: 2023-04-15

## 2023-04-15 RX ORDER — BISACODYL 5 MG/1
10 TABLET, DELAYED RELEASE ORAL ONCE
Status: COMPLETED | OUTPATIENT
Start: 2023-04-15 | End: 2023-04-15

## 2023-04-15 RX ORDER — HYDRALAZINE HYDROCHLORIDE 25 MG/1
25 TABLET, FILM COATED ORAL ONCE
Status: COMPLETED | OUTPATIENT
Start: 2023-04-15 | End: 2023-04-15

## 2023-04-15 RX ORDER — DEXAMETHASONE 4 MG/1
4 TABLET ORAL DAILY
Status: DISCONTINUED | OUTPATIENT
Start: 2023-04-15 | End: 2023-04-15

## 2023-04-15 RX ORDER — HYDRALAZINE HYDROCHLORIDE 25 MG/1
25 TABLET, FILM COATED ORAL EVERY 4 HOURS PRN
Status: DISCONTINUED | OUTPATIENT
Start: 2023-04-15 | End: 2023-04-18

## 2023-04-15 RX ADMIN — CALCIUM CARBONATE-VITAMIN D TAB 500 MG-200 UNIT 1 TABLET: 500-200 TAB at 11:53

## 2023-04-15 RX ADMIN — DOCUSATE SODIUM 100 MG: 100 CAPSULE, LIQUID FILLED ORAL at 11:53

## 2023-04-15 RX ADMIN — Medication 10 ML: at 20:24

## 2023-04-15 RX ADMIN — LOSARTAN POTASSIUM 50 MG: 50 TABLET, FILM COATED ORAL at 11:53

## 2023-04-15 RX ADMIN — HEPARIN SODIUM 5000 UNITS: 10000 INJECTION INTRAVENOUS; SUBCUTANEOUS at 23:16

## 2023-04-15 RX ADMIN — ATORVASTATIN CALCIUM 20 MG: 20 TABLET, FILM COATED ORAL at 20:17

## 2023-04-15 RX ADMIN — HYDRALAZINE HYDROCHLORIDE 25 MG: 25 TABLET, FILM COATED ORAL at 23:11

## 2023-04-15 RX ADMIN — DOCUSATE SODIUM 100 MG: 100 CAPSULE, LIQUID FILLED ORAL at 20:17

## 2023-04-15 RX ADMIN — POLYETHYLENE GLYCOL 3350 17 G: 17 POWDER, FOR SOLUTION ORAL at 11:00

## 2023-04-15 RX ADMIN — LACTULOSE 30 G: 20 SOLUTION ORAL at 16:05

## 2023-04-15 RX ADMIN — HEPARIN SODIUM 5000 UNITS: 10000 INJECTION INTRAVENOUS; SUBCUTANEOUS at 05:25

## 2023-04-15 RX ADMIN — ACETAMINOPHEN 650 MG: 325 TABLET ORAL at 20:18

## 2023-04-15 RX ADMIN — PANTOPRAZOLE SODIUM 40 MG: 40 TABLET, DELAYED RELEASE ORAL at 05:23

## 2023-04-15 RX ADMIN — HYOSCYAMINE SULFATE 125 MCG: 0.12 TABLET ORAL at 09:00

## 2023-04-15 RX ADMIN — Medication 10 ML: at 11:00

## 2023-04-15 RX ADMIN — DEXAMETHASONE 4 MG: 4 TABLET ORAL at 14:00

## 2023-04-15 RX ADMIN — GABAPENTIN 100 MG: 100 CAPSULE ORAL at 11:53

## 2023-04-15 RX ADMIN — BISACODYL 10 MG: 5 TABLET, COATED ORAL at 16:06

## 2023-04-15 RX ADMIN — DICLOFENAC SODIUM 2 G: 10 GEL TOPICAL at 11:30

## 2023-04-15 RX ADMIN — HYOSCYAMINE SULFATE 125 MCG: 0.12 TABLET ORAL at 16:06

## 2023-04-15 RX ADMIN — GABAPENTIN 100 MG: 100 CAPSULE ORAL at 20:17

## 2023-04-15 RX ADMIN — METOPROLOL TARTRATE 12.5 MG: 25 TABLET, FILM COATED ORAL at 20:17

## 2023-04-15 RX ADMIN — BUPROPION HYDROCHLORIDE 150 MG: 150 TABLET, EXTENDED RELEASE ORAL at 09:00

## 2023-04-15 RX ADMIN — LEVOTHYROXINE SODIUM 100 MCG: 100 TABLET ORAL at 05:23

## 2023-04-15 RX ADMIN — MIRTAZAPINE 30 MG: 15 TABLET, FILM COATED ORAL at 20:17

## 2023-04-15 RX ADMIN — HEPARIN SODIUM 5000 UNITS: 10000 INJECTION INTRAVENOUS; SUBCUTANEOUS at 15:00

## 2023-04-15 ASSESSMENT — PAIN SCALES - GENERAL
PAINLEVEL_OUTOF10: 8
PAINLEVEL_OUTOF10: 0

## 2023-04-15 ASSESSMENT — PAIN DESCRIPTION - DESCRIPTORS: DESCRIPTORS: ACHING;DISCOMFORT

## 2023-04-15 ASSESSMENT — PAIN DESCRIPTION - LOCATION: LOCATION: ABDOMEN

## 2023-04-16 PROBLEM — I35.0 NONRHEUMATIC AORTIC VALVE STENOSIS: Status: ACTIVE | Noted: 2023-04-16

## 2023-04-16 PROBLEM — Z01.810 PREOP CARDIOVASCULAR EXAM: Status: ACTIVE | Noted: 2023-04-16

## 2023-04-16 PROCEDURE — 99223 1ST HOSP IP/OBS HIGH 75: CPT | Performed by: INTERNAL MEDICINE

## 2023-04-16 PROCEDURE — 6370000000 HC RX 637 (ALT 250 FOR IP): Performed by: INTERNAL MEDICINE

## 2023-04-16 PROCEDURE — G0378 HOSPITAL OBSERVATION PER HR: HCPCS

## 2023-04-16 PROCEDURE — 6370000000 HC RX 637 (ALT 250 FOR IP): Performed by: NURSE PRACTITIONER

## 2023-04-16 PROCEDURE — APPSS60 APP SPLIT SHARED TIME 46-60 MINUTES

## 2023-04-16 PROCEDURE — 96372 THER/PROPH/DIAG INJ SC/IM: CPT

## 2023-04-16 PROCEDURE — 6360000002 HC RX W HCPCS: Performed by: NURSE PRACTITIONER

## 2023-04-16 PROCEDURE — 2580000003 HC RX 258: Performed by: NURSE PRACTITIONER

## 2023-04-16 RX ORDER — LACTULOSE 10 G/15ML
20 SOLUTION ORAL 2 TIMES DAILY
Status: DISCONTINUED | OUTPATIENT
Start: 2023-04-16 | End: 2023-04-19 | Stop reason: HOSPADM

## 2023-04-16 RX ADMIN — POLYETHYLENE GLYCOL 3350 17 G: 17 POWDER, FOR SOLUTION ORAL at 09:52

## 2023-04-16 RX ADMIN — HEPARIN SODIUM 5000 UNITS: 10000 INJECTION INTRAVENOUS; SUBCUTANEOUS at 21:15

## 2023-04-16 RX ADMIN — DICLOFENAC SODIUM 2 G: 10 GEL TOPICAL at 21:15

## 2023-04-16 RX ADMIN — Medication 10 ML: at 21:13

## 2023-04-16 RX ADMIN — HYOSCYAMINE SULFATE 125 MCG: 0.12 TABLET ORAL at 17:42

## 2023-04-16 RX ADMIN — GABAPENTIN 100 MG: 100 CAPSULE ORAL at 09:50

## 2023-04-16 RX ADMIN — ACETAMINOPHEN 650 MG: 325 TABLET ORAL at 21:14

## 2023-04-16 RX ADMIN — GABAPENTIN 100 MG: 100 CAPSULE ORAL at 21:14

## 2023-04-16 RX ADMIN — Medication 10 ML: at 09:53

## 2023-04-16 RX ADMIN — LEVOTHYROXINE SODIUM 100 MCG: 100 TABLET ORAL at 06:02

## 2023-04-16 RX ADMIN — HYOSCYAMINE SULFATE 125 MCG: 0.12 TABLET ORAL at 09:51

## 2023-04-16 RX ADMIN — METOPROLOL TARTRATE 12.5 MG: 25 TABLET, FILM COATED ORAL at 21:13

## 2023-04-16 RX ADMIN — DOCUSATE SODIUM 100 MG: 100 CAPSULE, LIQUID FILLED ORAL at 09:51

## 2023-04-16 RX ADMIN — LACTULOSE 20 G: 20 SOLUTION ORAL at 09:59

## 2023-04-16 RX ADMIN — ACETAMINOPHEN 650 MG: 325 TABLET ORAL at 06:02

## 2023-04-16 RX ADMIN — HEPARIN SODIUM 5000 UNITS: 10000 INJECTION INTRAVENOUS; SUBCUTANEOUS at 06:04

## 2023-04-16 RX ADMIN — BUPROPION HYDROCHLORIDE 150 MG: 150 TABLET, EXTENDED RELEASE ORAL at 09:52

## 2023-04-16 RX ADMIN — HYDRALAZINE HYDROCHLORIDE 25 MG: 25 TABLET, FILM COATED ORAL at 06:03

## 2023-04-16 RX ADMIN — ATORVASTATIN CALCIUM 20 MG: 20 TABLET, FILM COATED ORAL at 21:14

## 2023-04-16 RX ADMIN — MIRTAZAPINE 30 MG: 15 TABLET, FILM COATED ORAL at 21:14

## 2023-04-16 RX ADMIN — LOSARTAN POTASSIUM 50 MG: 50 TABLET, FILM COATED ORAL at 09:52

## 2023-04-16 RX ADMIN — DICLOFENAC SODIUM 2 G: 10 GEL TOPICAL at 09:52

## 2023-04-16 RX ADMIN — PANTOPRAZOLE SODIUM 40 MG: 40 TABLET, DELAYED RELEASE ORAL at 06:02

## 2023-04-16 RX ADMIN — DOCUSATE SODIUM 100 MG: 100 CAPSULE, LIQUID FILLED ORAL at 21:14

## 2023-04-16 RX ADMIN — CALCIUM CARBONATE-VITAMIN D TAB 500 MG-200 UNIT 1 TABLET: 500-200 TAB at 09:50

## 2023-04-16 RX ADMIN — HEPARIN SODIUM 5000 UNITS: 10000 INJECTION INTRAVENOUS; SUBCUTANEOUS at 15:16

## 2023-04-16 ASSESSMENT — PAIN DESCRIPTION - LOCATION
LOCATION: SHOULDER
LOCATION: ABDOMEN

## 2023-04-16 ASSESSMENT — PAIN DESCRIPTION - ORIENTATION: ORIENTATION: RIGHT

## 2023-04-16 ASSESSMENT — PAIN DESCRIPTION - DESCRIPTORS
DESCRIPTORS: ACHING
DESCRIPTORS: ACHING;DISCOMFORT

## 2023-04-16 ASSESSMENT — PAIN SCALES - GENERAL
PAINLEVEL_OUTOF10: 7
PAINLEVEL_OUTOF10: 6

## 2023-04-17 ENCOUNTER — ANESTHESIA (OUTPATIENT)
Dept: ENDOSCOPY | Age: 89
DRG: 307 | End: 2023-04-17
Payer: MEDICARE

## 2023-04-17 ENCOUNTER — ANESTHESIA EVENT (OUTPATIENT)
Dept: ENDOSCOPY | Age: 89
DRG: 307 | End: 2023-04-17
Payer: MEDICARE

## 2023-04-17 LAB
ALBUMIN SERPL-MCNC: 3.6 G/DL (ref 3.5–5.2)
ALP SERPL-CCNC: 108 U/L (ref 35–104)
ALT SERPL-CCNC: 40 U/L (ref 0–32)
ANION GAP SERPL CALCULATED.3IONS-SCNC: 8 MMOL/L (ref 7–16)
AST SERPL-CCNC: 38 U/L (ref 0–31)
BASOPHILS # BLD: 0.06 E9/L (ref 0–0.2)
BASOPHILS NFR BLD: 0.7 % (ref 0–2)
BILIRUB SERPL-MCNC: 0.3 MG/DL (ref 0–1.2)
BUN SERPL-MCNC: 20 MG/DL (ref 6–23)
CALCIUM SERPL-MCNC: 9 MG/DL (ref 8.6–10.2)
CHLORIDE SERPL-SCNC: 96 MMOL/L (ref 98–107)
CO2 SERPL-SCNC: 28 MMOL/L (ref 22–29)
CREAT SERPL-MCNC: 1.1 MG/DL (ref 0.5–1)
EOSINOPHIL # BLD: 0.4 E9/L (ref 0.05–0.5)
EOSINOPHIL NFR BLD: 4.9 % (ref 0–6)
ERYTHROCYTE [DISTWIDTH] IN BLOOD BY AUTOMATED COUNT: 14.8 FL (ref 11.5–15)
GLUCOSE SERPL-MCNC: 91 MG/DL (ref 74–99)
HCT VFR BLD AUTO: 33.6 % (ref 34–48)
HGB BLD-MCNC: 11.1 G/DL (ref 11.5–15.5)
IMM GRANULOCYTES # BLD: 0.04 E9/L
IMM GRANULOCYTES NFR BLD: 0.5 % (ref 0–5)
INR BLD: 1
LYMPHOCYTES # BLD: 2.88 E9/L (ref 1.5–4)
LYMPHOCYTES NFR BLD: 35.2 % (ref 20–42)
MCH RBC QN AUTO: 31.8 PG (ref 26–35)
MCHC RBC AUTO-ENTMCNC: 33 % (ref 32–34.5)
MCV RBC AUTO: 96.3 FL (ref 80–99.9)
MONOCYTES # BLD: 0.71 E9/L (ref 0.1–0.95)
MONOCYTES NFR BLD: 8.7 % (ref 2–12)
NEUTROPHILS # BLD: 4.1 E9/L (ref 1.8–7.3)
NEUTS SEG NFR BLD: 50 % (ref 43–80)
PLATELET # BLD AUTO: 202 E9/L (ref 130–450)
PMV BLD AUTO: 11.1 FL (ref 7–12)
POTASSIUM SERPL-SCNC: 4.3 MMOL/L (ref 3.5–5)
PROT SERPL-MCNC: 6.1 G/DL (ref 6.4–8.3)
PROTHROMBIN TIME: 11 SEC (ref 9.3–12.4)
RBC # BLD AUTO: 3.49 E12/L (ref 3.5–5.5)
SODIUM SERPL-SCNC: 132 MMOL/L (ref 132–146)
WBC # BLD: 8.2 E9/L (ref 4.5–11.5)

## 2023-04-17 PROCEDURE — 3700000000 HC ANESTHESIA ATTENDED CARE: Performed by: INTERNAL MEDICINE

## 2023-04-17 PROCEDURE — 87081 CULTURE SCREEN ONLY: CPT

## 2023-04-17 PROCEDURE — 2709999900 HC NON-CHARGEABLE SUPPLY: Performed by: INTERNAL MEDICINE

## 2023-04-17 PROCEDURE — 7100000011 HC PHASE II RECOVERY - ADDTL 15 MIN: Performed by: INTERNAL MEDICINE

## 2023-04-17 PROCEDURE — 0DB98ZX EXCISION OF DUODENUM, VIA NATURAL OR ARTIFICIAL OPENING ENDOSCOPIC, DIAGNOSTIC: ICD-10-PCS | Performed by: INTERNAL MEDICINE

## 2023-04-17 PROCEDURE — 6370000000 HC RX 637 (ALT 250 FOR IP): Performed by: INTERNAL MEDICINE

## 2023-04-17 PROCEDURE — 3609017700 HC EGD DILATION GASTRIC/DUODENAL STRICTURE: Performed by: INTERNAL MEDICINE

## 2023-04-17 PROCEDURE — 85025 COMPLETE CBC W/AUTO DIFF WBC: CPT

## 2023-04-17 PROCEDURE — 2580000003 HC RX 258: Performed by: NURSE PRACTITIONER

## 2023-04-17 PROCEDURE — 88305 TISSUE EXAM BY PATHOLOGIST: CPT

## 2023-04-17 PROCEDURE — 3700000001 HC ADD 15 MINUTES (ANESTHESIA): Performed by: INTERNAL MEDICINE

## 2023-04-17 PROCEDURE — G0378 HOSPITAL OBSERVATION PER HR: HCPCS

## 2023-04-17 PROCEDURE — 99232 SBSQ HOSP IP/OBS MODERATE 35: CPT | Performed by: INTERNAL MEDICINE

## 2023-04-17 PROCEDURE — 6360000002 HC RX W HCPCS: Performed by: INTERNAL MEDICINE

## 2023-04-17 PROCEDURE — 2580000003 HC RX 258: Performed by: INTERNAL MEDICINE

## 2023-04-17 PROCEDURE — 97530 THERAPEUTIC ACTIVITIES: CPT

## 2023-04-17 PROCEDURE — 6360000002 HC RX W HCPCS

## 2023-04-17 PROCEDURE — 36415 COLL VENOUS BLD VENIPUNCTURE: CPT

## 2023-04-17 PROCEDURE — 2500000003 HC RX 250 WO HCPCS

## 2023-04-17 PROCEDURE — 85610 PROTHROMBIN TIME: CPT

## 2023-04-17 PROCEDURE — 0DB68ZX EXCISION OF STOMACH, VIA NATURAL OR ARTIFICIAL OPENING ENDOSCOPIC, DIAGNOSTIC: ICD-10-PCS | Performed by: INTERNAL MEDICINE

## 2023-04-17 PROCEDURE — 80053 COMPREHEN METABOLIC PANEL: CPT

## 2023-04-17 PROCEDURE — 7100000010 HC PHASE II RECOVERY - FIRST 15 MIN: Performed by: INTERNAL MEDICINE

## 2023-04-17 RX ORDER — LIDOCAINE HYDROCHLORIDE 20 MG/ML
INJECTION, SOLUTION EPIDURAL; INFILTRATION; INTRACAUDAL; PERINEURAL PRN
Status: DISCONTINUED | OUTPATIENT
Start: 2023-04-17 | End: 2023-04-17 | Stop reason: SDUPTHER

## 2023-04-17 RX ORDER — GLYCOPYRROLATE 0.2 MG/ML
INJECTION INTRAMUSCULAR; INTRAVENOUS PRN
Status: DISCONTINUED | OUTPATIENT
Start: 2023-04-17 | End: 2023-04-17 | Stop reason: SDUPTHER

## 2023-04-17 RX ORDER — PROPOFOL 10 MG/ML
INJECTION, EMULSION INTRAVENOUS PRN
Status: DISCONTINUED | OUTPATIENT
Start: 2023-04-17 | End: 2023-04-17 | Stop reason: SDUPTHER

## 2023-04-17 RX ADMIN — HEPARIN SODIUM 5000 UNITS: 10000 INJECTION INTRAVENOUS; SUBCUTANEOUS at 15:24

## 2023-04-17 RX ADMIN — MIRTAZAPINE 30 MG: 15 TABLET, FILM COATED ORAL at 21:05

## 2023-04-17 RX ADMIN — Medication 10 ML: at 09:56

## 2023-04-17 RX ADMIN — DOCUSATE SODIUM 100 MG: 100 CAPSULE, LIQUID FILLED ORAL at 21:05

## 2023-04-17 RX ADMIN — CALCIUM CARBONATE-VITAMIN D TAB 500 MG-200 UNIT 1 TABLET: 500-200 TAB at 15:30

## 2023-04-17 RX ADMIN — BUPROPION HYDROCHLORIDE 150 MG: 150 TABLET, EXTENDED RELEASE ORAL at 15:30

## 2023-04-17 RX ADMIN — ATORVASTATIN CALCIUM 20 MG: 20 TABLET, FILM COATED ORAL at 21:05

## 2023-04-17 RX ADMIN — Medication 10 ML: at 21:06

## 2023-04-17 RX ADMIN — LACTULOSE 20 G: 20 SOLUTION ORAL at 15:30

## 2023-04-17 RX ADMIN — LOSARTAN POTASSIUM 50 MG: 50 TABLET, FILM COATED ORAL at 15:30

## 2023-04-17 RX ADMIN — GLYCOPYRROLATE 0.2 MG: 0.2 INJECTION, SOLUTION INTRAMUSCULAR; INTRAVENOUS at 13:26

## 2023-04-17 RX ADMIN — HYOSCYAMINE SULFATE 125 MCG: 0.12 TABLET ORAL at 15:30

## 2023-04-17 RX ADMIN — PANTOPRAZOLE SODIUM 40 MG: 40 TABLET, DELAYED RELEASE ORAL at 15:30

## 2023-04-17 RX ADMIN — DOCUSATE SODIUM 100 MG: 100 CAPSULE, LIQUID FILLED ORAL at 15:30

## 2023-04-17 RX ADMIN — METOPROLOL TARTRATE 12.5 MG: 25 TABLET, FILM COATED ORAL at 21:05

## 2023-04-17 RX ADMIN — LIDOCAINE HYDROCHLORIDE 50 MG: 20 INJECTION, SOLUTION EPIDURAL; INFILTRATION; INTRACAUDAL; PERINEURAL at 13:26

## 2023-04-17 RX ADMIN — HEPARIN SODIUM 5000 UNITS: 10000 INJECTION INTRAVENOUS; SUBCUTANEOUS at 21:09

## 2023-04-17 RX ADMIN — DICLOFENAC SODIUM 2 G: 10 GEL TOPICAL at 21:05

## 2023-04-17 RX ADMIN — LEVOTHYROXINE SODIUM 100 MCG: 100 TABLET ORAL at 15:30

## 2023-04-17 RX ADMIN — PROPOFOL 90 MG: 10 INJECTION, EMULSION INTRAVENOUS at 13:26

## 2023-04-17 RX ADMIN — SODIUM CHLORIDE: 9 INJECTION, SOLUTION INTRAVENOUS at 13:22

## 2023-04-17 RX ADMIN — HYDRALAZINE HYDROCHLORIDE 25 MG: 25 TABLET, FILM COATED ORAL at 15:13

## 2023-04-17 RX ADMIN — GABAPENTIN 100 MG: 100 CAPSULE ORAL at 21:05

## 2023-04-17 RX ADMIN — GABAPENTIN 100 MG: 100 CAPSULE ORAL at 15:30

## 2023-04-17 ASSESSMENT — LIFESTYLE VARIABLES: SMOKING_STATUS: 0

## 2023-04-17 NOTE — BRIEF OP NOTE
Brief Postoperative Note    Dalia Bradley  YOB: 1926  [id number]    Procedure: EGD with biopsy    Anesthesia: [facility]    Surgeon:  Daniela Conroy MD    Findings:       Esophagus:  GERD , PEPTIC STRICTURE AND LARGE HIATAL HERNIA. AREA DILATED WITH #52 FR.  LOPEZ      Stomach:  Gastritis bx done to rule out H. Pylori      Duodenum:  Normal    Bx. done to rule out sprue       Complications: None      Estimated blood loss: none      Mulugeta Burgos MD

## 2023-04-17 NOTE — ANESTHESIA PRE PROCEDURE
IntraVENous ONCE PRN Renetta Ponce MD        polyethylene glycol (GLYCOLAX) packet 17 g  17 g Oral Daily Renetta Ponce MD   17 g at 04/16/23 0952    oyster shell calcium w/D 500-5 MG-MCG tablet 1 tablet  1 tablet Oral Daily April KINZA Ba - CNP   1 tablet at 04/16/23 0950    diclofenac sodium (VOLTAREN) 1 % gel 2 g  2 g Topical BID Renetta Ponce MD   2 g at 04/16/23 2115    metoprolol tartrate (LOPRESSOR) tablet 12.5 mg  12.5 mg Oral Nightly Renetta Ponce MD   12.5 mg at 04/16/23 2113    gabapentin (NEURONTIN) capsule 100 mg  100 mg Oral BID Renetta Ponce MD   100 mg at 04/16/23 2114    buPROPion (WELLBUTRIN XL) extended release tablet 150 mg  150 mg Oral Daily Renetta Ponce MD   150 mg at 04/16/23 9840       Allergies:     Allergies   Allergen Reactions    Keflex [Cephalexin]     Reglan [Metoclopramide Hcl] Other (See Comments)     Makes me feel \"high\"       Problem List:    Patient Active Problem List   Diagnosis Code    Breast pain in female N64.4    GERD (gastroesophageal reflux disease) K21.9    Dysphagia R13.10    Esophageal dysmotility K22.4    Renal insufficiency N28.9    Hypertension I10    Serum lipids high E78.5    Acquired hypothyroidism E03.9    Chronic idiopathic constipation K59.04    Near syncope R55    Murmur R01.1    Preop cardiovascular exam Z01.810    Nonrheumatic aortic valve stenosis I35.0       Past Medical History:        Diagnosis Date    Anxiety     Depression     DJD (degenerative joint disease)     Dysphagia 02/20/2014    GERD (gastroesophageal reflux disease) 02/20/2014    Hyperlipidemia     Hypertension     Hypothyroidism     Irritable bowel syndrome with constipation     Low vitamin D level     Osteoarthritis        Past Surgical History:        Procedure Laterality Date    APPENDECTOMY      CARPAL TUNNEL RELEASE Right 10/05/2016    RIGHT INDEX FINGER TRIGGER RELEASE RIGHT THUMB CARPAL METACARPAL ARTHROPLASTY WITH LIGAMENT RECONSTRUCTION

## 2023-04-17 NOTE — ANESTHESIA POSTPROCEDURE EVALUATION
Department of Anesthesiology  Postprocedure Note    Patient: Alexsander Jackson  MRN: [id number]  [place]: 4/15/1926  Date of evaluation: 4/17/2023      Procedure Summary     Date: 04/17/23 Room / Location: [facility] / [facility]    Anesthesia Start: 3820 Anesthesia Stop: 1335    Procedures:       EGD BIOPSY      EGD ESOPHAGOGASTRODUODENOSCOPY DILATATION Diagnosis:       Syncope, unspecified syncope type      (Syncope, unspecified syncope type [R55])    Surgeons: Nicolas Briseno MD Responsible Provider: Yolanda Clark MD    Anesthesia Type: MAC ASA Status: 3          Anesthesia Type: No value filed.     Tl Phase I:      Tl Phase II:        Anesthesia Post Evaluation    Patient location during evaluation: PACU  Patient participation: complete - patient participated  Level of consciousness: awake and alert  Pain score: 0  Airway patency: patent  Nausea & Vomiting: no nausea and no vomiting  Complications: no  Cardiovascular status: blood pressure returned to baseline  Respiratory status: acceptable  Hydration status: euvolemic

## 2023-04-17 NOTE — CARE COORDINATION
For EGD today. PT am-pac 20. WW recommended per PT- no DME preference- [facility] notified of order- will deliver to pt's room today. Plan remains to return home alone on discharge- daughter will provide transportation. Will follow Sloane Joyce RN case manager    The Plan for Transition of Care is related to the following treatment goals: DME equipment for mobility    The Patient and/or patient representative Rebeca Cardenas was provided with a choice of provider and agrees   with the discharge plan. [x] Yes [] No    Freedom of choice list was provided with basic dialogue that supports the patient's individualized plan of care/goals, treatment preferences and shares the quality data associated with the providers.  [x] Yes [] No

## 2023-04-18 LAB
ALBUMIN SERPL-MCNC: 3.4 G/DL (ref 3.5–5.2)
ALP SERPL-CCNC: 113 U/L (ref 35–104)
ALT SERPL-CCNC: 42 U/L (ref 0–32)
ANION GAP SERPL CALCULATED.3IONS-SCNC: 10 MMOL/L (ref 7–16)
AST SERPL-CCNC: 38 U/L (ref 0–31)
BASOPHILS # BLD: 0.08 E9/L (ref 0–0.2)
BASOPHILS NFR BLD: 0.9 % (ref 0–2)
BILIRUB SERPL-MCNC: 0.3 MG/DL (ref 0–1.2)
BUN SERPL-MCNC: 15 MG/DL (ref 6–23)
CALCIUM SERPL-MCNC: 9.2 MG/DL (ref 8.6–10.2)
CHLORIDE SERPL-SCNC: 96 MMOL/L (ref 98–107)
CO2 SERPL-SCNC: 24 MMOL/L (ref 22–29)
CREAT SERPL-MCNC: 1 MG/DL (ref 0.5–1)
EOSINOPHIL # BLD: 0.44 E9/L (ref 0.05–0.5)
EOSINOPHIL NFR BLD: 5.1 % (ref 0–6)
ERYTHROCYTE [DISTWIDTH] IN BLOOD BY AUTOMATED COUNT: 14.9 FL (ref 11.5–15)
GLUCOSE SERPL-MCNC: 90 MG/DL (ref 74–99)
HCT VFR BLD AUTO: 36.5 % (ref 34–48)
HGB BLD-MCNC: 11.8 G/DL (ref 11.5–15.5)
IMM GRANULOCYTES # BLD: 0.04 E9/L
IMM GRANULOCYTES NFR BLD: 0.5 % (ref 0–5)
LYMPHOCYTES # BLD: 2.13 E9/L (ref 1.5–4)
LYMPHOCYTES NFR BLD: 24.8 % (ref 20–42)
MCH RBC QN AUTO: 32 PG (ref 26–35)
MCHC RBC AUTO-ENTMCNC: 32.3 % (ref 32–34.5)
MCV RBC AUTO: 98.9 FL (ref 80–99.9)
MONOCYTES # BLD: 0.79 E9/L (ref 0.1–0.95)
MONOCYTES NFR BLD: 9.2 % (ref 2–12)
NEUTROPHILS # BLD: 5.11 E9/L (ref 1.8–7.3)
NEUTS SEG NFR BLD: 59.5 % (ref 43–80)
PLATELET # BLD AUTO: 211 E9/L (ref 130–450)
PMV BLD AUTO: 11.2 FL (ref 7–12)
POTASSIUM SERPL-SCNC: 4.3 MMOL/L (ref 3.5–5)
PROT SERPL-MCNC: 5.9 G/DL (ref 6.4–8.3)
RBC # BLD AUTO: 3.69 E12/L (ref 3.5–5.5)
REASON FOR REJECTION: NORMAL
REJECTED TEST: NORMAL
SODIUM SERPL-SCNC: 130 MMOL/L (ref 132–146)
WBC # BLD: 8.6 E9/L (ref 4.5–11.5)

## 2023-04-18 PROCEDURE — 85025 COMPLETE CBC W/AUTO DIFF WBC: CPT

## 2023-04-18 PROCEDURE — 36415 COLL VENOUS BLD VENIPUNCTURE: CPT

## 2023-04-18 PROCEDURE — 6370000000 HC RX 637 (ALT 250 FOR IP): Performed by: INTERNAL MEDICINE

## 2023-04-18 PROCEDURE — 6360000002 HC RX W HCPCS: Performed by: INTERNAL MEDICINE

## 2023-04-18 PROCEDURE — 80053 COMPREHEN METABOLIC PANEL: CPT

## 2023-04-18 PROCEDURE — 2580000003 HC RX 258: Performed by: INTERNAL MEDICINE

## 2023-04-18 PROCEDURE — 97530 THERAPEUTIC ACTIVITIES: CPT

## 2023-04-18 PROCEDURE — 99233 SBSQ HOSP IP/OBS HIGH 50: CPT | Performed by: INTERNAL MEDICINE

## 2023-04-18 PROCEDURE — 2060000000 HC ICU INTERMEDIATE R&B

## 2023-04-18 RX ORDER — 0.9 % SODIUM CHLORIDE 0.9 %
500 INTRAVENOUS SOLUTION INTRAVENOUS ONCE
Status: COMPLETED | OUTPATIENT
Start: 2023-04-18 | End: 2023-04-18

## 2023-04-18 RX ADMIN — HEPARIN SODIUM 5000 UNITS: 10000 INJECTION INTRAVENOUS; SUBCUTANEOUS at 07:30

## 2023-04-18 RX ADMIN — LEVOTHYROXINE SODIUM 100 MCG: 100 TABLET ORAL at 07:30

## 2023-04-18 RX ADMIN — GABAPENTIN 100 MG: 100 CAPSULE ORAL at 20:29

## 2023-04-18 RX ADMIN — HEPARIN SODIUM 5000 UNITS: 10000 INJECTION INTRAVENOUS; SUBCUTANEOUS at 22:06

## 2023-04-18 RX ADMIN — HYOSCYAMINE SULFATE 125 MCG: 0.12 TABLET ORAL at 09:40

## 2023-04-18 RX ADMIN — ACETAMINOPHEN 650 MG: 325 TABLET ORAL at 22:06

## 2023-04-18 RX ADMIN — ATORVASTATIN CALCIUM 20 MG: 20 TABLET, FILM COATED ORAL at 20:28

## 2023-04-18 RX ADMIN — GABAPENTIN 100 MG: 100 CAPSULE ORAL at 09:40

## 2023-04-18 RX ADMIN — MIRTAZAPINE 30 MG: 15 TABLET, FILM COATED ORAL at 20:29

## 2023-04-18 RX ADMIN — LACTULOSE 20 G: 20 SOLUTION ORAL at 20:28

## 2023-04-18 RX ADMIN — DICLOFENAC SODIUM 2 G: 10 GEL TOPICAL at 08:11

## 2023-04-18 RX ADMIN — SODIUM CHLORIDE 500 ML: 9 INJECTION, SOLUTION INTRAVENOUS at 08:50

## 2023-04-18 RX ADMIN — DICLOFENAC SODIUM 2 G: 10 GEL TOPICAL at 20:29

## 2023-04-18 RX ADMIN — BUPROPION HYDROCHLORIDE 150 MG: 150 TABLET, EXTENDED RELEASE ORAL at 09:40

## 2023-04-18 RX ADMIN — DOCUSATE SODIUM 100 MG: 100 CAPSULE, LIQUID FILLED ORAL at 09:41

## 2023-04-18 RX ADMIN — CALCIUM CARBONATE-VITAMIN D TAB 500 MG-200 UNIT 1 TABLET: 500-200 TAB at 09:40

## 2023-04-18 RX ADMIN — POLYETHYLENE GLYCOL 3350 17 G: 17 POWDER, FOR SOLUTION ORAL at 09:41

## 2023-04-18 RX ADMIN — HEPARIN SODIUM 5000 UNITS: 10000 INJECTION INTRAVENOUS; SUBCUTANEOUS at 14:48

## 2023-04-18 RX ADMIN — PANTOPRAZOLE SODIUM 40 MG: 40 TABLET, DELAYED RELEASE ORAL at 07:30

## 2023-04-18 RX ADMIN — Medication 10 ML: at 09:41

## 2023-04-18 RX ADMIN — METOPROLOL TARTRATE 12.5 MG: 25 TABLET, FILM COATED ORAL at 20:29

## 2023-04-18 RX ADMIN — LACTULOSE 20 G: 20 SOLUTION ORAL at 09:41

## 2023-04-18 RX ADMIN — Medication 10 ML: at 20:29

## 2023-04-18 RX ADMIN — DOCUSATE SODIUM 100 MG: 100 CAPSULE, LIQUID FILLED ORAL at 20:29

## 2023-04-18 ASSESSMENT — PAIN SCALES - GENERAL
PAINLEVEL_OUTOF10: 3
PAINLEVEL_OUTOF10: 6
PAINLEVEL_OUTOF10: 8

## 2023-04-18 ASSESSMENT — PAIN - FUNCTIONAL ASSESSMENT
PAIN_FUNCTIONAL_ASSESSMENT: PREVENTS OR INTERFERES SOME ACTIVE ACTIVITIES AND ADLS
PAIN_FUNCTIONAL_ASSESSMENT: ACTIVITIES ARE NOT PREVENTED
PAIN_FUNCTIONAL_ASSESSMENT: ACTIVITIES ARE NOT PREVENTED

## 2023-04-18 ASSESSMENT — PAIN DESCRIPTION - FREQUENCY
FREQUENCY: CONTINUOUS
FREQUENCY: CONTINUOUS

## 2023-04-18 ASSESSMENT — PAIN DESCRIPTION - ONSET
ONSET: ON-GOING
ONSET: ON-GOING

## 2023-04-18 ASSESSMENT — PAIN DESCRIPTION - DESCRIPTORS
DESCRIPTORS: DISCOMFORT;ACHING;SORE
DESCRIPTORS: ACHING;DISCOMFORT
DESCRIPTORS: ACHING;DISCOMFORT

## 2023-04-18 ASSESSMENT — PAIN DESCRIPTION - LOCATION
LOCATION: SHOULDER

## 2023-04-18 ASSESSMENT — PAIN DESCRIPTION - ORIENTATION
ORIENTATION: RIGHT

## 2023-04-18 NOTE — OP NOTE
[address] [address]                                OPERATIVE REPORT    PATIENT NAME: Araceli Durand                   :        04/15/1926  MED REC NO:   [id number]                            ROOM:       4897  ACCOUNT NO:   [de-identified]                           ADMIT DATE: 2023  PROVIDER:     Solitario Tuttle MD    DATE OF PROCEDURE:  2023    OPERATION PERFORMED:  Upper endoscopy with biopsy and Baez  dilatation. PREOPERATIVE DIAGNOSES:  Dysphagia and epigastric pain. POSTOPERATIVE DIAGNOSES:  Grade B LA classification GERD, peptic  stricture, large hiatal hernia, and stricture dilated with #52-[place]  Alphia Burger. Stomach showed gastritis, biopsied to rule out H. pylori  infection. Duodenum biopsied to rule out sprue. SURGEON:  Solitario Tuttle MD.    ANESTHESIA:  LMAC. NOTE:  Prior to the procedure an informed consent was obtained from the  patient after explaining the benefits as well as the risks,  alternatives, and complications of the procedure to the patient, who  understood and agreed. PROCEDURE:  With the patient in the left lateral decubitus position, the  Olympus GIF-100 forward-viewing videoscope was introduced into the  esophagus, the evaluation of esophagus showed peptic stricture and a  large hiatal hernia at 35 cm from the incisor. The scope was then advanced through the gastroesophageal junction into  the gastric body, along the greater curvature. Evaluation of the body  of the stomach showed gastritis. Biopsied to rule out H. pylori  infection. The scope was then advanced through the pylorus into the duodenal bulb  and second portion of the duodenum. Duodenum biopsied to rule out  sprue.     The scope was then straightened, the area deflated, and the procedure  was terminated by withdrawing the scope and conducting a second look on  the way out, which was essentially the

## 2023-04-18 NOTE — PLAN OF CARE
Problem: Discharge Planning  Goal: Discharge to home or other facility with appropriate resources  Outcome: Progressing     Problem: Safety - Adult  Goal: Free from fall injury  Outcome: Progressing     Problem: ABCDS Injury Assessment  Goal: Absence of physical injury  Outcome: Progressing     Problem: Pain  Goal: Verbalizes/displays adequate comfort level or baseline comfort level  Outcome: Progressing IV pump

## 2023-04-18 NOTE — CARE COORDINATION
PT am-pac 20. WW delivered to pt's room per [address] [facility]. Plan remains to return home alone on discharge with daughters and granddaughter assist- declining HHC- daughter will provide transportation.  Will follow Marcia Perez RN case manager

## 2023-04-19 VITALS
HEIGHT: 63 IN | HEART RATE: 72 BPM | BODY MASS INDEX: 27.54 KG/M2 | OXYGEN SATURATION: 97 % | TEMPERATURE: 96.5 F | RESPIRATION RATE: 18 BRPM | SYSTOLIC BLOOD PRESSURE: 140 MMHG | DIASTOLIC BLOOD PRESSURE: 89 MMHG | WEIGHT: 155.44 LBS

## 2023-04-19 LAB
ALBUMIN SERPL-MCNC: 3.4 G/DL (ref 3.5–5.2)
ALP SERPL-CCNC: 115 U/L (ref 35–104)
ALT SERPL-CCNC: 37 U/L (ref 0–32)
ANION GAP SERPL CALCULATED.3IONS-SCNC: 10 MMOL/L (ref 7–16)
AST SERPL-CCNC: 29 U/L (ref 0–31)
BASOPHILS # BLD: 0.05 E9/L (ref 0–0.2)
BASOPHILS NFR BLD: 0.6 % (ref 0–2)
BILIRUB SERPL-MCNC: <0.2 MG/DL (ref 0–1.2)
BUN SERPL-MCNC: 19 MG/DL (ref 6–23)
CALCIUM SERPL-MCNC: 8.9 MG/DL (ref 8.6–10.2)
CHLORIDE SERPL-SCNC: 96 MMOL/L (ref 98–107)
CO2 SERPL-SCNC: 25 MMOL/L (ref 22–29)
CREAT SERPL-MCNC: 1 MG/DL (ref 0.5–1)
EOSINOPHIL # BLD: 0.42 E9/L (ref 0.05–0.5)
EOSINOPHIL NFR BLD: 5.4 % (ref 0–6)
ERYTHROCYTE [DISTWIDTH] IN BLOOD BY AUTOMATED COUNT: 14.9 FL (ref 11.5–15)
GLUCOSE SERPL-MCNC: 112 MG/DL (ref 74–99)
HCT VFR BLD AUTO: 32.7 % (ref 34–48)
HGB BLD-MCNC: 10.7 G/DL (ref 11.5–15.5)
IMM GRANULOCYTES # BLD: 0.04 E9/L
IMM GRANULOCYTES NFR BLD: 0.5 % (ref 0–5)
LYMPHOCYTES # BLD: 1.72 E9/L (ref 1.5–4)
LYMPHOCYTES NFR BLD: 22 % (ref 20–42)
MCH RBC QN AUTO: 32 PG (ref 26–35)
MCHC RBC AUTO-ENTMCNC: 32.7 % (ref 32–34.5)
MCV RBC AUTO: 97.9 FL (ref 80–99.9)
MONOCYTES # BLD: 0.74 E9/L (ref 0.1–0.95)
MONOCYTES NFR BLD: 9.5 % (ref 2–12)
NEUTROPHILS # BLD: 4.86 E9/L (ref 1.8–7.3)
NEUTS SEG NFR BLD: 62 % (ref 43–80)
PLATELET # BLD AUTO: 192 E9/L (ref 130–450)
PMV BLD AUTO: 11.1 FL (ref 7–12)
POTASSIUM SERPL-SCNC: 4 MMOL/L (ref 3.5–5)
PROT SERPL-MCNC: 5.8 G/DL (ref 6.4–8.3)
RBC # BLD AUTO: 3.34 E12/L (ref 3.5–5.5)
SODIUM SERPL-SCNC: 131 MMOL/L (ref 132–146)
UREASE TISS QL: NORMAL
WBC # BLD: 7.8 E9/L (ref 4.5–11.5)

## 2023-04-19 PROCEDURE — 6370000000 HC RX 637 (ALT 250 FOR IP): Performed by: INTERNAL MEDICINE

## 2023-04-19 PROCEDURE — 97530 THERAPEUTIC ACTIVITIES: CPT

## 2023-04-19 PROCEDURE — 99232 SBSQ HOSP IP/OBS MODERATE 35: CPT | Performed by: INTERNAL MEDICINE

## 2023-04-19 PROCEDURE — 36415 COLL VENOUS BLD VENIPUNCTURE: CPT

## 2023-04-19 PROCEDURE — 6360000002 HC RX W HCPCS: Performed by: INTERNAL MEDICINE

## 2023-04-19 PROCEDURE — 80053 COMPREHEN METABOLIC PANEL: CPT

## 2023-04-19 PROCEDURE — 85025 COMPLETE CBC W/AUTO DIFF WBC: CPT

## 2023-04-19 PROCEDURE — 2580000003 HC RX 258: Performed by: INTERNAL MEDICINE

## 2023-04-19 RX ORDER — GABAPENTIN 100 MG/1
100 CAPSULE ORAL 2 TIMES DAILY
Qty: 60 CAPSULE | Refills: 0 | Status: SHIPPED | OUTPATIENT
Start: 2023-04-19 | End: 2023-05-19

## 2023-04-19 RX ORDER — LOSARTAN POTASSIUM 25 MG/1
25 TABLET ORAL DAILY
Status: DISCONTINUED | OUTPATIENT
Start: 2023-04-20 | End: 2023-04-19 | Stop reason: HOSPADM

## 2023-04-19 RX ORDER — LOSARTAN POTASSIUM 25 MG/1
25 TABLET ORAL DAILY
Qty: 30 TABLET | Refills: 3 | Status: SHIPPED | OUTPATIENT
Start: 2023-04-20

## 2023-04-19 RX ADMIN — Medication 10 ML: at 09:00

## 2023-04-19 RX ADMIN — LACTULOSE 20 G: 20 SOLUTION ORAL at 08:53

## 2023-04-19 RX ADMIN — HEPARIN SODIUM 5000 UNITS: 10000 INJECTION INTRAVENOUS; SUBCUTANEOUS at 06:13

## 2023-04-19 RX ADMIN — PANTOPRAZOLE SODIUM 40 MG: 40 TABLET, DELAYED RELEASE ORAL at 06:13

## 2023-04-19 RX ADMIN — DOCUSATE SODIUM 100 MG: 100 CAPSULE, LIQUID FILLED ORAL at 08:53

## 2023-04-19 RX ADMIN — CALCIUM CARBONATE-VITAMIN D TAB 500 MG-200 UNIT 1 TABLET: 500-200 TAB at 08:53

## 2023-04-19 RX ADMIN — DICLOFENAC SODIUM 2 G: 10 GEL TOPICAL at 08:53

## 2023-04-19 RX ADMIN — GABAPENTIN 100 MG: 100 CAPSULE ORAL at 08:53

## 2023-04-19 RX ADMIN — LEVOTHYROXINE SODIUM 100 MCG: 100 TABLET ORAL at 06:13

## 2023-04-19 RX ADMIN — BUPROPION HYDROCHLORIDE 150 MG: 150 TABLET, EXTENDED RELEASE ORAL at 08:53

## 2023-04-19 NOTE — CARE COORDINATION
PT am-pac 20. WW delivered to pt's room per [facility]. Plan remains to return home alone today with daughters and granddaughter assist- declining HHC- family member will provide transportation. No needs.   Dione Hunt, RN case manager

## 2023-04-19 NOTE — PLAN OF CARE
Problem: Discharge Planning  Goal: Discharge to home or other facility with appropriate resources  4/19/2023 0016 by Duy Conte RN  Outcome: Progressing  4/18/2023 1106 by Lacey Miguel RN  Outcome: Progressing     Problem: Safety - Adult  Goal: Free from fall injury  4/18/2023 1106 by Lacey Miguel RN  Outcome: Progressing     Problem: ABCDS Injury Assessment  Goal: Absence of physical injury  4/18/2023 1106 by Lacey Miguel RN  Outcome: Progressing     Problem: Pain  Goal: Verbalizes/displays adequate comfort level or baseline comfort level  4/18/2023 1106 by Lacey Miguel RN  Outcome: Progressing

## 2023-04-19 NOTE — PLAN OF CARE
Problem: Discharge Planning  Goal: Discharge to home or other facility with appropriate resources  4/19/2023 1033 by Jovana Clayton RN  Outcome: Progressing     Problem: Safety - Adult  Goal: Free from fall injury  Outcome: Progressing     Problem: ABCDS Injury Assessment  Goal: Absence of physical injury  Outcome: Progressing     Problem: Pain  Goal: Verbalizes/displays adequate comfort level or baseline comfort level  Outcome: Progressing

## 2023-04-19 NOTE — PROGRESS NOTES
CLINICAL PHARMACY NOTE: MEDS TO BEDS    Total # of Prescriptions Filled: 2   The following medications were delivered to the patient:  Losartan 25 mg  Gabapentin 100 mg    Additional Documentation:
INPATIENT CARDIOLOGY FOLLOW-UP    Name: Chelita Coleman    Age: 81 y.o. Date of Admission: 4/13/2023  2:22 AM    Date of Service: 4/19/2023    Chief Complaint: Follow-up for cardiac clearance for an EGD    Interim History:  No new overnight cardiac complaints. Patient has an EGD on 4/17/23 >>peptic stricture, large hiatal hernia, and dilated with #52. Patient fell 4 times without loss of consciousness over the last few weeks. Feeling great this morning no more fatigue, dizziness lightheadedness or falls. She would like to go home. Currently with no complaints of CP, SOB, palpitations, dizziness, or lightheadedness. SR on telemetry. Review of Systems:   Cardiac: As per HPI  General: No fever, chills  Pulmonary: As per HPI  HEENT: No visual disturbances, difficult swallowing  GI: No nausea, vomiting  Endocrine: No thyroid disease or DM  Musculoskeletal: GROVE x 4, no focal motor deficits  Skin: Intact, no rashes  Neuro/Psych: No headache or seizures    Problem List:  Patient Active Problem List   Diagnosis    Breast pain in female    GERD (gastroesophageal reflux disease)    Dysphagia    Esophageal dysmotility    Renal insufficiency    Hypertension    Serum lipids high    Acquired hypothyroidism    Chronic idiopathic constipation    Near syncope    Murmur    Preop cardiovascular exam    Nonrheumatic aortic valve stenosis       Allergies:   Allergies   Allergen Reactions    Keflex [Cephalexin]     Reglan [Metoclopramide Hcl] Other (See Comments)     Makes me feel \"high\"       Current Medications:  Current Facility-Administered Medications   Medication Dose Route Frequency Provider Last Rate Last Admin    lactulose (CHRONULAC) 10 GM/15ML solution 20 g  20 g Oral BID Cm Delvalle MD   20 g at 04/19/23 0853    docusate sodium (COLACE) capsule 100 mg  100 mg Oral BID Cm Delvalle MD   100 mg at 04/19/23 0853    sodium chloride flush 0.9 % injection 5-40 mL  5-40 mL IntraVENous 2 times per day Francked LIVE
INPATIENT CARDIOLOGY FOLLOW-UP    Name: Rohan Graham    Age: 81 y.o. Date of Admission: 4/13/2023  2:22 AM    Date of Service: 4/17/2023    Chief Complaint: Follow-up for cardiac clearance for an EGD    Interim History:  No new overnight cardiac complaints. Told me she is going for an EGD this morning. Patient fell 4 times without loss of consciousness over the last few weeks. Currently with no complaints of CP, SOB, palpitations, dizziness, or lightheadedness. SR on telemetry. Review of Systems:   Cardiac: As per HPI  General: No fever, chills  Pulmonary: As per HPI  HEENT: No visual disturbances, difficult swallowing  GI: No nausea, vomiting  Endocrine: No thyroid disease or DM  Musculoskeletal: GROVE x 4, no focal motor deficits  Skin: Intact, no rashes  Neuro/Psych: No headache or seizures    Problem List:  Patient Active Problem List   Diagnosis    Breast pain in female    GERD (gastroesophageal reflux disease)    Dysphagia    Esophageal dysmotility    Renal insufficiency    Hypertension    Serum lipids high    Acquired hypothyroidism    Chronic idiopathic constipation    Near syncope    Murmur    Preop cardiovascular exam    Nonrheumatic aortic valve stenosis       Allergies:   Allergies   Allergen Reactions    Keflex [Cephalexin]     Reglan [Metoclopramide Hcl] Other (See Comments)     Makes me feel \"high\"       Current Medications:  Current Facility-Administered Medications   Medication Dose Route Frequency Provider Last Rate Last Admin    lactulose (CHRONULAC) 10 GM/15ML solution 20 g  20 g Oral BID Marcio Spaulding MD   20 g at 04/16/23 0959    hydrALAZINE (APRESOLINE) tablet 25 mg  25 mg Oral Q4H PRN Marcio Spaulding MD   25 mg at 04/16/23 0603    docusate sodium (COLACE) capsule 100 mg  100 mg Oral BID Olivia A Sugar, APRN - CNP   100 mg at 04/16/23 2114    hyoscyamine (ANASPAZ;LEVSIN) tablet 125 mcg  125 mcg Oral BID WC Olivia A Sugar, APRN - CNP   125 mcg at 04/16/23 1742    sodium chloride flush 0.9
[facility]   Progress note      Subjective: The patient is awake and alert. NPO pending EGD today w/ gastro. No acute events overnight. Denies chest pain, angina, SOB     Objective:    BP (!) 189/72   Pulse 71   Temp 97.5 °F (36.4 °C) (Oral)   Resp 18   Ht 5' 3\" (1.6 m)   Wt 154 lb 1.6 oz (69.9 kg)   SpO2 97%   BMI 27.30 kg/m²     In: 280 [P.O.:180; I.V.:100]  Out: -   In: 280   Out: -     Appearance: Awake, alert, no acute respiratory distress  Skin: Intact, no rash  Head: Normocephalic, atraumatic  Eyes: EOMI, no conjunctival erythema  ENMT: No pharyngeal erythema, MMM, no rhinorrhea  Neck: Supple, no elevated JVP, no carotid bruits  Lungs: Clear to auscultation bilaterally. No wheezes, rales, or rhonchi. Cardiac: Regular rate and rhythm, +S1S2, ESM 4/6   Abdomen: Soft, nontender, +bowel sounds  Extremities: Moves all extremities x 4, no lower extremity edema  Neurologic: No focal motor deficits apparent, normal mood and affect  Peripheral Pulses: Intact posterior tibial pulses bilaterally     Recent Labs     04/17/23  0450   WBC 8.2   HGB 11.1*   HCT 33.6*          Recent Labs     04/17/23  0450      K 4.3   CL 96*   CO2 28   BUN 20   CREATININE 1.1*   CALCIUM 9.0       Assessment:    Principal Problem:    Near syncope  Active Problems:    Preop cardiovascular exam    Nonrheumatic aortic valve stenosis  Resolved Problems:    * No resolved hospital problems.  *      Plan:    4/17/2023  --NPO pending EGD todday w/ gastro, release diet after if able/appropriate  --cardiology cleared pt for EGD, appreciate input  --TAVR w/u as outpatient recommended as well as holter monitor to be arranged as outpatient  --work w/ PT/OT  --encourage po intake of fluids and nutrients  --cardiology advises against vasodilators  --echo done 4/13/2023  --will follow w/ gastro  --social work for d/c planning  --fall precuations    Raji So DO  3:55 PM  4/17/2023
Nursing Transfer Note    Data:  Summary of patients progress: S/P EGD w/ biopsy  Reason for transfer: inpatient hospital stay      Action:  Explained reason for transfer to Patient. Report given to: 2W RN, using RN Handoff Navigator.   Mode of transportation: bed cart      Response:  RN Recommendations: See MAR/orders/notes
Occupational Therapy  OT BEDSIDE TREATMENT NOTE      Date:2023  Patient Name: Garrison Amador  MRN: [id number]  : 4/15/1926  Room: [address] [place] [address]       Evaluating OT: AZUL Oviedo/EVERTON, [id number]     Referring Provider:KINZA Dickerson CNP  Specific Provider Orders/Date: OT Eval and Treat 23     Diagnosis: Near syncope [R55]   Surgery:  NA    Pertinent Medical History:      Past Medical History        Past Medical History:   Diagnosis Date    Anxiety      Depression      DJD (degenerative joint disease)      Dysphagia 2014    GERD (gastroesophageal reflux disease) 2014    Hyperlipidemia      Hypertension      Hypothyroidism      Irritable bowel syndrome with constipation      Low vitamin D level      Osteoarthritis              Past Surgical History         Past Surgical History:   Procedure Laterality Date    APPENDECTOMY        CARPAL TUNNEL RELEASE Right 10/05/2016     RIGHT INDEX FINGER TRIGGER RELEASE RIGHT THUMB CARPAL METACARPAL ARTHROPLASTY WITH LIGAMENT RECONSTRUCTION    CATARACT EXTRACTION, BILATERAL        CHOLECYSTECTOMY, LAPAROSCOPIC        ENDOSCOPY, COLON, DIAGNOSTIC   2014     biopsy    HYSTERECTOMY (CERVIX STATUS UNKNOWN)         45 yrs ago     JOINT REPLACEMENT         bilateral knees    UPPER GASTROINTESTINAL ENDOSCOPY   10/05/2018     Dr. Proctor Busdaniel         Precautions:  Fall Risk, macular degeneration     Assessment of current deficits    [] Functional mobility          [x]ADLs           [x] Strength                  [x]Cognition    [x] Functional transfers         [x] IADLs         [x] Safety Awareness   [x]Endurance    [x] Fine Coordination                        [x] Balance      [] Vision/perception   [x]Sensation      []Gross Motor Coordination            [] ROM           [] Delirium                   [] Motor Control      OT PLAN OF CARE   OT POC based on physician orders, patient diagnosis and results of clinical assessment     Frequency/Duration 1-3
Patient up ambulating halls. Has been NPO. EGD with dilation for today, reviewed with the patient, all questions answered. Labs reviewed. Awaiting PT/INR. Assessment unchanged- OK to proceed. No family present at this time.
Physical Therapy  Facility/Department: [address] INTERNAL MEDICINE 2  Physical Therapy Initial Assessment    Name: Chasity Sy  : 4/15/1926  MRN: [id number]  Date of Service: 2023    Attending Provider:  Panfilo Patino DO    Evaluating PT:  Ashley Rocha, P.T. Room #:  1438/4071-N  Diagnosis:  Near syncope [R55], has been falling at home. Precautions:  Falls, bed/chair alarm  Equipment Needs:  wheeled walker     SUBJECTIVE:    Pt lives alone in a 2nd floor apt story home with 3 stairs and 1 rail to enter the building and 6 steps and 2 rails to her 2nd floor apt. Pt ambulated with no AD PTA. OBJECTIVE:   Initial Evaluation  Date: 23 Treatment  2023 Short Term/ Long Term   Goals   Was pt agreeable to Eval/treatment? yes yes    Does pt have pain? No c/o pain No complaints    Bed Mobility  Rolling: Independent  Supine to sit: Independent  Sit to supine: Independent  Scooting: Independent Rolling: Independent  Supine to sit:  Independent Independent   Transfers Sit to stand: supervision  Stand to sit: supervision  Stand pivot: supervision with ww Sit <> stand; Supervision Independent    Ambulation   15 feet with no AD SBA/CGA and 250 feet with ww supervision 180 + 15 feet x 1 using WW for support  feet with ww Independent    Stair negotiation: ascended and descended 4 steps with 1 rail SBA NT 8 steps with 1 rail supervision   AM-PAC 6 Clicks 33/61 38/45        Pt is alert and able to follow instruction  Balance: fair dynamic using [address] for support    Pt performed therapeutic exercise of the following: NT    Patient education/treatment  Pt was educated on upright posture and staying within [address] base of support during gait    Patient response to education:   Pt verbalized understanding Pt demonstrated skill Pt requires further education in this area   yes With instruction yes     ASSESSMENT:   Comments: Nurse ok with rx, states Pt with an earlier episode of dizziness, not to
Physician Progress Note      PATIENT:               Shivam Pearson  CSN #:                  [id number]  :                       4/15/1926  ADMIT DATE:       2023 2:22 AM  DISCH DATE:  RESPONDING  PROVIDER #:        Jennifer Mayen DO          QUERY TEXT:    Patient admitted with syncope. Noted documentation in cardiology  PN \"Near   syncope most likely related to severe symptomatic aortic stenosis\". If   possible, please document in progress notes and discharge summary after study   the etiology of the syncope: The medical record reflects the following:  Risk Factors: severe aortic stenosis  Clinical Indicators:  cardiology PN \"Near syncope most likely related to   severe symptomatic aortic stenosis. Catherine Search Catherine Search Arrange 14-day Holter monitor to rule out   arrhythmia contributing to her near syncopal episodes. Avoid vasodilators   such as nitroglycerin and hydralazine. Follow-up with Dr. Morales Farmer as an   outpatient for TAVR work-up and referral to valve clinic. \"  Treatment: avoid vasodilators, outpatient Holter monitor, referral to valve   clinic    Thank you,  Edgardo Byrd RN, CCDS  Clinical Documentation   [email or web address]  Options provided:  -- Syncope due to aortic stenosis  -- Syncope due to, Please document cause of syncope. -- Other - I will add my own diagnosis  -- Disagree - Not applicable / Not valid  -- Disagree - Clinically unable to determine / Unknown  -- Refer to Clinical Documentation Reviewer    PROVIDER RESPONSE TEXT:    The syncope is due to aortic stenosis.     Query created by: Farnaz Ruffin on 2023 8:27 AM      Electronically signed by:  Jennifer Mayen DO 2023 8:57 AM
[facility]   Progress note      Subjective: The patient is awake and alert. Tolerating diet this AM. S/p egd w/ dilation w/ GI team, tolerated well. No acute events overnight. Denies chest pain, angina, SOB     Objective:    BP (!) 136/54   Pulse 76   Temp 97.7 °F (36.5 °C) (Temporal)   Resp 18   Ht 5' 3\" (1.6 m)   Wt 152 lb (68.9 kg)   SpO2 100%   BMI 26.93 kg/m²     In: 200 [P.O.:100; I.V.:100]  Out: -   In: 200   Out: -     Appearance: Awake, alert, no acute respiratory distress  Skin: Intact, no rash  Head: Normocephalic, atraumatic  Eyes: EOMI, no conjunctival erythema  ENMT: No pharyngeal erythema, MMM, no rhinorrhea  Neck: Supple, no elevated JVP, no carotid bruits  Lungs: Clear to auscultation bilaterally. No wheezes, rales, or rhonchi. Cardiac: Regular rate and rhythm, +S1S2, ESM 4/6   Abdomen: Soft, nontender, +bowel sounds  Extremities: Moves all extremities x 4, no lower extremity edema  Neurologic: No focal motor deficits apparent, normal mood and affect  Peripheral Pulses: Intact posterior tibial pulses bilaterally     Recent Labs     04/17/23  0450 04/18/23  0615   WBC 8.2 8.6   HGB 11.1* 11.8   HCT 33.6* 36.5    211       Recent Labs     04/17/23  0450 04/18/23  0405    130*   K 4.3 4.3   CL 96* 96*   CO2 28 24   BUN 20 15   CREATININE 1.1* 1.0   CALCIUM 9.0 9.2       Assessment:    Principal Problem:    Near syncope  Active Problems:    Preop cardiovascular exam    Nonrheumatic aortic valve stenosis  Resolved Problems:    * No resolved hospital problems.  *      Plan:    4/17/2023  --NPO pending EGD todday w/ gastro, release diet after if able/appropriate  --cardiology cleared pt for EGD, appreciate input  --TAVR w/u as outpatient recommended as well as holter monitor to be arranged as outpatient  --work w/ PT/OT  --encourage po intake of fluids and nutrients  --cardiology advises against vasodilators  --echo done 4/13/2023  --will follow w/
Component Value Date    HDL 63 02/22/2023    HDL 71 06/28/2022    HDL 54 09/22/2017       Lab Results   Component Value Date    LDLCALC 85 02/22/2023    LDLCALC 96 06/28/2022    LDLCALC 68 09/22/2017       Lab Results   Component Value Date    LABVLDL 25 02/22/2023    LABVLDL 29 06/28/2022    LABVLDL 28 09/22/2017      PT/INR:    Lab Results   Component Value Date/Time    PROTIME 11.0 04/17/2023 10:58 AM    INR 1.0 04/17/2023 10:58 AM       Assessment:     Principal Problem:  Recent falls  Dysphagia- HX of requiring dilation in the past- 2 years ago  Electrolyte abnormalities  Constipation  EGD 4/17/23:  Grade B LA classification GERD, peptic stricture, large hiatal hernia, and stricture dilated with #52-[facility] [place] [employer]. Stomach showed gastritis, biopsied to rule out H. Pylori infection. Duodenum biopsied to rule out sprue.     Plan:     Diet per speech recs  Continue Protonix 40 MG daily  Colace 100 MG BID  DC Levsin  OK to DC from GI POV; when OK W others  Will sign off,  call again if needed    Discussed with Dr. Sheryl Kessler per Dr. Huey Louise, NP-C 4/18/2023 2:30 PM For Dr. Guillermina Rodriguez
to pill planner, lucy mgmt. Completes light meal prep. Driving: no  Occupation: retired     Pain Level: did not complain of pain  Cognition: A&O: to all but year; Follows 2-3 step directions. Some cues for redirection needed, verbose              Memory: F              Sequencing: F              Problem solving: F              Judgement/safety: F                Functional Assessment: AM-PAC Daily Activity Raw Score: 18/24    Initial Eval Status  Date: 4/14/23 Treatment Status  Date: 4/17/23 STGs = LTGs  Time frame: 10-14 days   Feeding Set up A             Grooming SBA  To wash hands in standing- cues for visual location of items at sink d/t impaired vision      Mod I while standing at sink    UB Dressing Set up A  SBA to don gown around back    Independent    LB Dressing SBA  To doff/mady socks at EOB     SBA to arrange brief standing Mod I   Bathing SBA      Mod I   Toileting SBA      Independent    Bed Mobility  Rolling: Independent   Supine to sit: supervision  Sit to supine: supervision  supine <> sit supervision Independent    Functional Transfers Sit to stand: supervision  Stand to sit: supervision  sit <> stand supervision Independent      Functional Mobility SBA no AE  For in-room distances  CGA without device, SBA with WW in room and marina Independent    Balance Sitting:     Static:  Independent     Dynamic:supervision  Standing: supervision  standing balance supervision     Endurance/Activity Tolerance good  good     Visual/  Perceptual Glasses: none   -impaired vision, unable to read without high contrast         Comments:  patient cleared with nursing and agreeable to therapy. Patient slightly unsteady but improvement with walker, social work states walker has been ordered. Good participation. Patient returned to bed with call light in reach. Education/treatment: ADL and functional transfer/activity performed to increase safety and independence during self care tasks.  Education provided on safety
thrombus. No previous echo for comparison. Stress test:        Cardiac catheterization:         ASSESSMENT:  Near syncope most likely related to severe symptomatic aortic stenosis  VHD: Severe aortic stenosis  Dysphagia secondary to esophageal stricture >s/p dilation - 4/17/23, improved. Hypertension, stable, BP is low this AM 85/40, BP improved after placing the pacing in bed 124/70  Hyperlipidemia  Depression/anxiety  Hypothyroidism on HRT  IBS  Osteoarthritis  GERD  Mild anemia hemoglobin 11.1, stable>>11.8  Mild hyponatremia,     Plan:   GI input noted and appreciated. Arrange 14-day Holter monitor/Zio AT to rule out arrhythmia contributing to her near syncopal episodes  Avoid vasodilators such as nitroglycerin and hydralazine  Avoid dehydration. Echo results done on 4/13/2023 were reviewed, as above and discussed with the patient. TAVR work-up as an outpatient  Follow-up with Dr. Yolis Guerrier in couple of weeks as an outpatient for TAVR work-up and referral to valve clinic  Will give her  ml bolus and hold Losartan and rpt BP and heart rate after the bolus. Hold D/C this AM and reassess Bps later in the afternoon. Stephanie Garsia MD., Jules Palma.   [facility]

## 2023-04-19 NOTE — DISCHARGE SUMMARY
known as: LOPRESSOR  Take 0.5 tablets by mouth at bedtime  What changed:   how much to take  when to take this            CONTINUE taking these medications      BIOTIN PO     Calcium-Vitamin D 500-125 MG-UNIT Tabs     diclofenac sodium 1 % Gel  Commonly known as: VOLTAREN  Apply 2 g topically 2 times daily     diphenhydrAMINE 25 MG tablet  Commonly known as: BENADRYL     levothyroxine 100 MCG tablet  Commonly known as: SYNTHROID  Take 1 tablet by mouth daily     mirtazapine 30 MG tablet  Commonly known as: REMERON  Take 1 tablet by mouth nightly     omeprazole 40 MG delayed release capsule  Commonly known as: PRILOSEC  Take 1 capsule by mouth daily     simvastatin 40 MG tablet  Commonly known as: ZOCOR  Take 1 tablet by mouth nightly               Where to Get Your Medications        These medications were sent to [facility], [address] [place] [address], [address] [place] [address]      Phone: [phone]   gabapentin 100 MG capsule  losartan 25 MG tablet  metoprolol tartrate 25 MG tablet       Activity: {discharge activity:24601}  Diet: cardiac diet    Pt has been advised to: Follow-up with Filiberto Galindo MD in 1 week.   Follow-up with consultants as recommended by them    Note that over 30 minutes was spent in preparing discharge papers, discussing discharge with patient, medication review, etc.    Signed:  Lamar Arrieta DO  4/19/2023  10:20 AM

## 2023-04-21 ENCOUNTER — NURSE ONLY (OUTPATIENT)
Dept: CARDIOLOGY CLINIC | Age: 89
End: 2023-04-21

## 2023-04-21 NOTE — PROGRESS NOTES
Patient was seen today and a 14 day AT monitor was placed. Monitor was ordered by Dr. Susie Meyer. The monitor was applied, instructions were given to the patient. Patient stated understanding and gave verbalize readback.    Monitor company: [employer]  Serial number: [id number]

## 2023-05-10 ENCOUNTER — OFFICE VISIT (OUTPATIENT)
Dept: CARDIOLOGY CLINIC | Age: 89
End: 2023-05-10
Payer: MEDICARE

## 2023-05-10 VITALS
BODY MASS INDEX: 25.83 KG/M2 | WEIGHT: 145.8 LBS | HEIGHT: 63 IN | HEART RATE: 74 BPM | RESPIRATION RATE: 16 BRPM | SYSTOLIC BLOOD PRESSURE: 138 MMHG | DIASTOLIC BLOOD PRESSURE: 62 MMHG | OXYGEN SATURATION: 96 %

## 2023-05-10 DIAGNOSIS — I38 VALVULAR HEART DISEASE: Primary | ICD-10-CM

## 2023-05-10 DIAGNOSIS — I35.0 AORTIC VALVE STENOSIS, ETIOLOGY OF CARDIAC VALVE DISEASE UNSPECIFIED: ICD-10-CM

## 2023-05-10 DIAGNOSIS — I35.0 NONRHEUMATIC AORTIC VALVE STENOSIS: Primary | ICD-10-CM

## 2023-05-10 DIAGNOSIS — I38 VALVULAR HEART DISEASE: ICD-10-CM

## 2023-05-10 PROCEDURE — 1123F ACP DISCUSS/DSCN MKR DOCD: CPT | Performed by: NURSE PRACTITIONER

## 2023-05-10 PROCEDURE — 93000 ELECTROCARDIOGRAM COMPLETE: CPT | Performed by: INTERNAL MEDICINE

## 2023-05-10 PROCEDURE — 99212 OFFICE O/P EST SF 10 MIN: CPT | Performed by: NURSE PRACTITIONER

## 2023-05-10 NOTE — PATIENT INSTRUCTIONS
Referral to valve clinic regarding your aortic stenosis  After Zio monitor results obtained will call you with results   Continue using walker for stability  Follow-up with Dr Lisa Vincent after results of heart monitor

## 2023-05-15 DIAGNOSIS — R55 SYNCOPE, UNSPECIFIED SYNCOPE TYPE: ICD-10-CM

## 2023-05-16 ENCOUNTER — OFFICE VISIT (OUTPATIENT)
Dept: PRIMARY CARE CLINIC | Age: 89
End: 2023-05-16
Payer: MEDICARE

## 2023-05-16 ENCOUNTER — TELEPHONE (OUTPATIENT)
Dept: CARDIOLOGY CLINIC | Age: 89
End: 2023-05-16

## 2023-05-16 DIAGNOSIS — I10 HYPERTENSION, UNSPECIFIED TYPE: ICD-10-CM

## 2023-05-16 DIAGNOSIS — I35.0 NONRHEUMATIC AORTIC VALVE STENOSIS: ICD-10-CM

## 2023-05-16 DIAGNOSIS — Z09 HOSPITAL DISCHARGE FOLLOW-UP: Primary | ICD-10-CM

## 2023-05-16 DIAGNOSIS — F41.9 ANXIETY: ICD-10-CM

## 2023-05-16 DIAGNOSIS — M77.9 TENDONITIS: ICD-10-CM

## 2023-05-16 PROBLEM — Z01.810 PREOP CARDIOVASCULAR EXAM: Status: RESOLVED | Noted: 2023-04-16 | Resolved: 2023-05-16

## 2023-05-16 PROCEDURE — 99213 OFFICE O/P EST LOW 20 MIN: CPT | Performed by: INTERNAL MEDICINE

## 2023-05-16 PROCEDURE — 1111F DSCHRG MED/CURRENT MED MERGE: CPT | Performed by: INTERNAL MEDICINE

## 2023-05-16 PROCEDURE — G0439 PPPS, SUBSEQ VISIT: HCPCS | Performed by: INTERNAL MEDICINE

## 2023-05-16 PROCEDURE — 20551 NJX 1 TENDON ORIGIN/INSJ: CPT | Performed by: INTERNAL MEDICINE

## 2023-05-16 PROCEDURE — 1123F ACP DISCUSS/DSCN MKR DOCD: CPT | Performed by: INTERNAL MEDICINE

## 2023-05-16 RX ORDER — BUPROPION HYDROCHLORIDE 150 MG/1
TABLET ORAL
Qty: 90 TABLET | Refills: 0 | Status: SHIPPED | OUTPATIENT
Start: 2023-05-16

## 2023-05-16 RX ORDER — LOSARTAN POTASSIUM 25 MG/1
25 TABLET ORAL DAILY
Qty: 30 TABLET | Refills: 3 | Status: SHIPPED | OUTPATIENT
Start: 2023-05-16

## 2023-05-16 RX ORDER — LIDOCAINE HYDROCHLORIDE 10 MG/ML
1 INJECTION, SOLUTION EPIDURAL; INFILTRATION; INTRACAUDAL; PERINEURAL ONCE
Status: COMPLETED | OUTPATIENT
Start: 2023-05-16 | End: 2023-05-16

## 2023-05-16 RX ORDER — METHYLPREDNISOLONE ACETATE 80 MG/ML
40 INJECTION, SUSPENSION INTRA-ARTICULAR; INTRALESIONAL; INTRAMUSCULAR; SOFT TISSUE ONCE
Status: COMPLETED | OUTPATIENT
Start: 2023-05-16 | End: 2023-05-16

## 2023-05-16 RX ADMIN — LIDOCAINE HYDROCHLORIDE 1 ML: 10 INJECTION, SOLUTION EPIDURAL; INFILTRATION; INTRACAUDAL; PERINEURAL at 11:00

## 2023-05-16 RX ADMIN — METHYLPREDNISOLONE ACETATE 40 MG: 80 INJECTION, SUSPENSION INTRA-ARTICULAR; INTRALESIONAL; INTRAMUSCULAR; SOFT TISSUE at 11:01

## 2023-05-16 ASSESSMENT — PATIENT HEALTH QUESTIONNAIRE - PHQ9
SUM OF ALL RESPONSES TO PHQ9 QUESTIONS 1 & 2: 3
8. MOVING OR SPEAKING SO SLOWLY THAT OTHER PEOPLE COULD HAVE NOTICED. OR THE OPPOSITE, BEING SO FIGETY OR RESTLESS THAT YOU HAVE BEEN MOVING AROUND A LOT MORE THAN USUAL: 0
10. IF YOU CHECKED OFF ANY PROBLEMS, HOW DIFFICULT HAVE THESE PROBLEMS MADE IT FOR YOU TO DO YOUR WORK, TAKE CARE OF THINGS AT HOME, OR GET ALONG WITH OTHER PEOPLE: 0
3. TROUBLE FALLING OR STAYING ASLEEP: 3
SUM OF ALL RESPONSES TO PHQ QUESTIONS 1-9: 12
6. FEELING BAD ABOUT YOURSELF - OR THAT YOU ARE A FAILURE OR HAVE LET YOURSELF OR YOUR FAMILY DOWN: 3
7. TROUBLE CONCENTRATING ON THINGS, SUCH AS READING THE NEWSPAPER OR WATCHING TELEVISION: 0
SUM OF ALL RESPONSES TO PHQ QUESTIONS 1-9: 12
SUM OF ALL RESPONSES TO PHQ QUESTIONS 1-9: 12
2. FEELING DOWN, DEPRESSED OR HOPELESS: 3
5. POOR APPETITE OR OVEREATING: 0
SUM OF ALL RESPONSES TO PHQ QUESTIONS 1-9: 12
1. LITTLE INTEREST OR PLEASURE IN DOING THINGS: 0
4. FEELING TIRED OR HAVING LITTLE ENERGY: 3
9. THOUGHTS THAT YOU WOULD BE BETTER OFF DEAD, OR OF HURTING YOURSELF: 0

## 2023-05-16 ASSESSMENT — LIFESTYLE VARIABLES
HOW MANY STANDARD DRINKS CONTAINING ALCOHOL DO YOU HAVE ON A TYPICAL DAY: 1 OR 2
HOW OFTEN DO YOU HAVE A DRINK CONTAINING ALCOHOL: MONTHLY OR LESS

## 2023-05-16 NOTE — PROGRESS NOTES
Chung Plascencia presents today for follow up after hospitalization, Medicare annual exam and regular follow up    Current Outpatient Medications   Medication Sig Dispense Refill    buPROPion (WELLBUTRIN XL) 150 MG extended release tablet TAKE 1 TABLET BY MOUTH ONCE DAILY 90 tablet 0    losartan (COZAAR) 25 MG tablet Take 1 tablet by mouth daily 30 tablet 3    Multiple Vitamin (MULTI-VITAMIN DAILY PO) Take by mouth daily      Multiple Vitamins-Minerals (PRESERVISION AREDS 2 PO) Take by mouth in the morning and at bedtime      gabapentin (NEURONTIN) 100 MG capsule Take 1 capsule by mouth in the morning and at bedtime for 30 days. 60 capsule 0    metoprolol tartrate (LOPRESSOR) 25 MG tablet Take 0.5 tablets by mouth at bedtime 15 tablet 3    mirtazapine (REMERON) 30 MG tablet Take 1 tablet by mouth nightly 90 tablet 0    levothyroxine (SYNTHROID) 100 MCG tablet Take 1 tablet by mouth daily 90 tablet 0    omeprazole (PRILOSEC) 40 MG delayed release capsule Take 1 capsule by mouth daily 90 capsule 0    simvastatin (ZOCOR) 40 MG tablet Take 1 tablet by mouth nightly 90 tablet 0    diclofenac sodium (VOLTAREN) 1 % GEL Apply 2 g topically 2 times daily 350 g 1    diphenhydrAMINE (BENADRYL) 25 MG tablet Take 1 tablet by mouth nightly      BIOTIN PO Take by mouth daily      Calcium-Vitamin D 500-125 MG-UNIT TABS Take 1 tablet by mouth daily        No current facility-administered medications for this visit. Past Medical History:   Diagnosis Date    Anxiety     Depression     DJD (degenerative joint disease)     Dysphagia 02/20/2014    GERD (gastroesophageal reflux disease) 02/20/2014    Hyperlipidemia     Hypertension     Hypothyroidism     Irritable bowel syndrome with constipation     Low vitamin D level     Osteoarthritis     Valvular heart disease           Subjective:  Here with daughter. Recently hospitalized due to recurrent falls, diagnosed with severe Aortic Stenosis.   Being evaluated at the [facility]

## 2023-05-18 VITALS
SYSTOLIC BLOOD PRESSURE: 138 MMHG | WEIGHT: 146 LBS | OXYGEN SATURATION: 96 % | BODY MASS INDEX: 25.87 KG/M2 | HEART RATE: 79 BPM | HEIGHT: 63 IN | DIASTOLIC BLOOD PRESSURE: 70 MMHG | TEMPERATURE: 97.9 F

## 2023-05-18 ASSESSMENT — ENCOUNTER SYMPTOMS
NAUSEA: 0
TROUBLE SWALLOWING: 0
VOMITING: 0
DIARRHEA: 0
ABDOMINAL DISTENTION: 0
ABDOMINAL PAIN: 0
CONSTIPATION: 0
SORE THROAT: 0
BACK PAIN: 0
ALLERGIC/IMMUNOLOGIC NEGATIVE: 1
BLOOD IN STOOL: 0
RHINORRHEA: 0
SINUS PRESSURE: 0
COLOR CHANGE: 0

## 2023-06-22 RX ORDER — SIMVASTATIN 40 MG
TABLET ORAL
Qty: 90 TABLET | Refills: 0 | Status: SHIPPED | OUTPATIENT
Start: 2023-06-22

## 2023-07-06 DIAGNOSIS — N61.0 NIPPLE INFLAMMATION: ICD-10-CM

## 2023-07-06 DIAGNOSIS — N64.4 MASTODYNIA: ICD-10-CM

## 2023-07-06 RX ORDER — LEVOTHYROXINE SODIUM 0.1 MG/1
TABLET ORAL
Qty: 90 TABLET | Refills: 0 | Status: SHIPPED | OUTPATIENT
Start: 2023-07-06

## 2023-07-31 DIAGNOSIS — F41.9 ANXIETY: ICD-10-CM

## 2023-07-31 RX ORDER — OMEPRAZOLE 40 MG/1
CAPSULE, DELAYED RELEASE ORAL
Qty: 90 CAPSULE | Refills: 0 | Status: SHIPPED | OUTPATIENT
Start: 2023-07-31

## 2023-07-31 RX ORDER — BUPROPION HYDROCHLORIDE 150 MG/1
TABLET ORAL
Qty: 90 TABLET | Refills: 0 | Status: SHIPPED | OUTPATIENT
Start: 2023-07-31

## 2023-07-31 NOTE — TELEPHONE ENCOUNTER
Last Appointment:  5/16/2023  Future Appointments   Date Time Provider [address]   8/16/2023 10:15 AM Andreia Huntley MD [facility]

## 2023-08-16 ENCOUNTER — OFFICE VISIT (OUTPATIENT)
Dept: PRIMARY CARE CLINIC | Age: 89
End: 2023-08-16
Payer: MEDICARE

## 2023-08-16 VITALS
TEMPERATURE: 97.8 F | HEART RATE: 62 BPM | SYSTOLIC BLOOD PRESSURE: 130 MMHG | HEIGHT: 63 IN | OXYGEN SATURATION: 96 % | RESPIRATION RATE: 16 BRPM | WEIGHT: 146 LBS | BODY MASS INDEX: 25.87 KG/M2 | DIASTOLIC BLOOD PRESSURE: 68 MMHG

## 2023-08-16 DIAGNOSIS — I35.0 NONRHEUMATIC AORTIC VALVE STENOSIS: ICD-10-CM

## 2023-08-16 DIAGNOSIS — M77.9 TENDONITIS: ICD-10-CM

## 2023-08-16 DIAGNOSIS — I25.10 ASHD (ARTERIOSCLEROTIC HEART DISEASE): ICD-10-CM

## 2023-08-16 DIAGNOSIS — E03.9 ACQUIRED HYPOTHYROIDISM: ICD-10-CM

## 2023-08-16 DIAGNOSIS — I10 HYPERTENSION, UNSPECIFIED TYPE: Primary | ICD-10-CM

## 2023-08-16 PROCEDURE — 1123F ACP DISCUSS/DSCN MKR DOCD: CPT | Performed by: INTERNAL MEDICINE

## 2023-08-16 PROCEDURE — 99214 OFFICE O/P EST MOD 30 MIN: CPT | Performed by: INTERNAL MEDICINE

## 2023-08-16 ASSESSMENT — ENCOUNTER SYMPTOMS
ALLERGIC/IMMUNOLOGIC NEGATIVE: 1
DIARRHEA: 0
BACK PAIN: 0
COLOR CHANGE: 0
SORE THROAT: 0
CONSTIPATION: 0
TROUBLE SWALLOWING: 0
ABDOMINAL DISTENTION: 0
RHINORRHEA: 0
BLOOD IN STOOL: 0
SINUS PRESSURE: 0
NAUSEA: 0
VOMITING: 0
ABDOMINAL PAIN: 0

## 2023-09-04 ENCOUNTER — APPOINTMENT (OUTPATIENT)
Dept: GENERAL RADIOLOGY | Age: 89
End: 2023-09-04
Payer: MEDICARE

## 2023-09-04 ENCOUNTER — HOSPITAL ENCOUNTER (EMERGENCY)
Age: 89
Discharge: ANOTHER HOSPITAL | End: 2023-09-05
Attending: EMERGENCY MEDICINE
Payer: MEDICARE

## 2023-09-04 DIAGNOSIS — I50.9 ACUTE CONGESTIVE HEART FAILURE, UNSPECIFIED HEART FAILURE TYPE (HCC): Primary | ICD-10-CM

## 2023-09-04 LAB
ALBUMIN SERPL-MCNC: 3.1 G/DL (ref 3.5–5.2)
ALP SERPL-CCNC: 80 U/L (ref 35–104)
ALT SERPL-CCNC: 24 U/L (ref 0–32)
ANION GAP SERPL CALCULATED.3IONS-SCNC: 10 MMOL/L (ref 7–16)
AST SERPL-CCNC: 43 U/L (ref 0–31)
BACTERIA URNS QL MICRO: ABNORMAL
BASOPHILS # BLD: 0.04 K/UL (ref 0–0.2)
BASOPHILS NFR BLD: 1 % (ref 0–2)
BILIRUB SERPL-MCNC: 0.2 MG/DL (ref 0–1.2)
BILIRUB UR QL STRIP: NEGATIVE
BNP SERPL-MCNC: ABNORMAL PG/ML (ref 0–450)
BUN SERPL-MCNC: 27 MG/DL (ref 6–23)
CALCIUM SERPL-MCNC: 8.5 MG/DL (ref 8.6–10.2)
CHLORIDE SERPL-SCNC: 91 MMOL/L (ref 98–107)
CLARITY UR: CLEAR
CO2 SERPL-SCNC: 24 MMOL/L (ref 22–29)
COLOR UR: YELLOW
CREAT SERPL-MCNC: 1.3 MG/DL (ref 0.5–1)
EOSINOPHIL # BLD: 0.52 K/UL (ref 0.05–0.5)
EOSINOPHILS RELATIVE PERCENT: 7 % (ref 0–6)
ERYTHROCYTE [DISTWIDTH] IN BLOOD BY AUTOMATED COUNT: 13.8 % (ref 11.5–15)
ERYTHROCYTE [DISTWIDTH] IN BLOOD BY AUTOMATED COUNT: 13.8 % (ref 11.5–15)
GFR SERPL CREATININE-BSD FRML MDRD: 38 ML/MIN/1.73M2
GLUCOSE SERPL-MCNC: 131 MG/DL (ref 74–99)
GLUCOSE UR STRIP-MCNC: NEGATIVE MG/DL
HCT VFR BLD AUTO: 31.1 % (ref 34–48)
HCT VFR BLD AUTO: 32.7 % (ref 34–48)
HGB BLD-MCNC: 10.5 G/DL (ref 11.5–15.5)
HGB BLD-MCNC: 10.9 G/DL (ref 11.5–15.5)
HGB UR QL STRIP.AUTO: NEGATIVE
IMM GRANULOCYTES # BLD AUTO: 0.03 K/UL (ref 0–0.58)
IMM GRANULOCYTES NFR BLD: 0 % (ref 0–5)
KETONES UR STRIP-MCNC: NEGATIVE MG/DL
LEUKOCYTE ESTERASE UR QL STRIP: ABNORMAL
LIPASE SERPL-CCNC: 97 U/L (ref 13–60)
LYMPHOCYTES NFR BLD: 1.25 K/UL (ref 1.5–4)
LYMPHOCYTES RELATIVE PERCENT: 17 % (ref 20–42)
MAGNESIUM SERPL-MCNC: 2.3 MG/DL (ref 1.6–2.6)
MCH RBC QN AUTO: 32.4 PG (ref 26–35)
MCH RBC QN AUTO: 32.6 PG (ref 26–35)
MCHC RBC AUTO-ENTMCNC: 33.3 G/DL (ref 32–34.5)
MCHC RBC AUTO-ENTMCNC: 33.8 G/DL (ref 32–34.5)
MCV RBC AUTO: 96.6 FL (ref 80–99.9)
MCV RBC AUTO: 97.3 FL (ref 80–99.9)
MONOCYTES NFR BLD: 0.84 K/UL (ref 0.1–0.95)
MONOCYTES NFR BLD: 11 % (ref 2–12)
NEUTROPHILS NFR BLD: 64 % (ref 43–80)
NEUTS SEG NFR BLD: 4.85 K/UL (ref 1.8–7.3)
NITRITE UR QL STRIP: NEGATIVE
PARTIAL THROMBOPLASTIN TIME: 28.4 SEC (ref 24.5–35.1)
PH UR STRIP: 6 [PH] (ref 5–9)
PLATELET # BLD AUTO: 191 K/UL (ref 130–450)
PLATELET # BLD AUTO: 203 K/UL (ref 130–450)
PMV BLD AUTO: 11 FL (ref 7–12)
PMV BLD AUTO: 11.3 FL (ref 7–12)
POTASSIUM SERPL-SCNC: 4.7 MMOL/L (ref 3.5–5)
PROT SERPL-MCNC: 5.9 G/DL (ref 6.4–8.3)
PROT UR STRIP-MCNC: NEGATIVE MG/DL
RBC # BLD AUTO: 3.22 M/UL (ref 3.5–5.5)
RBC # BLD AUTO: 3.36 M/UL (ref 3.5–5.5)
RBC #/AREA URNS HPF: ABNORMAL /HPF
SARS-COV-2 RDRP RESP QL NAA+PROBE: NOT DETECTED
SODIUM SERPL-SCNC: 125 MMOL/L (ref 132–146)
SP GR UR STRIP: <1.005 (ref 1–1.03)
SPECIMEN DESCRIPTION: NORMAL
TROPONIN I SERPL HS-MCNC: 4135 NG/L (ref 0–9)
TROPONIN I SERPL HS-MCNC: 4203 NG/L (ref 0–9)
UROBILINOGEN UR STRIP-ACNC: 0.2 EU/DL (ref 0–1)
WBC #/AREA URNS HPF: ABNORMAL /HPF
WBC OTHER # BLD: 7.5 K/UL (ref 4.5–11.5)
WBC OTHER # BLD: 7.9 K/UL (ref 4.5–11.5)

## 2023-09-04 PROCEDURE — 93005 ELECTROCARDIOGRAM TRACING: CPT

## 2023-09-04 PROCEDURE — 85025 COMPLETE CBC W/AUTO DIFF WBC: CPT

## 2023-09-04 PROCEDURE — 85027 COMPLETE CBC AUTOMATED: CPT

## 2023-09-04 PROCEDURE — 83880 ASSAY OF NATRIURETIC PEPTIDE: CPT

## 2023-09-04 PROCEDURE — 87635 SARS-COV-2 COVID-19 AMP PRB: CPT

## 2023-09-04 PROCEDURE — 71045 X-RAY EXAM CHEST 1 VIEW: CPT

## 2023-09-04 PROCEDURE — 6360000002 HC RX W HCPCS: Performed by: EMERGENCY MEDICINE

## 2023-09-04 PROCEDURE — 96365 THER/PROPH/DIAG IV INF INIT: CPT

## 2023-09-04 PROCEDURE — 83735 ASSAY OF MAGNESIUM: CPT

## 2023-09-04 PROCEDURE — 96376 TX/PRO/DX INJ SAME DRUG ADON: CPT

## 2023-09-04 PROCEDURE — 81001 URINALYSIS AUTO W/SCOPE: CPT

## 2023-09-04 PROCEDURE — 96366 THER/PROPH/DIAG IV INF ADDON: CPT

## 2023-09-04 PROCEDURE — 80053 COMPREHEN METABOLIC PANEL: CPT

## 2023-09-04 PROCEDURE — 85730 THROMBOPLASTIN TIME PARTIAL: CPT

## 2023-09-04 PROCEDURE — 96375 TX/PRO/DX INJ NEW DRUG ADDON: CPT

## 2023-09-04 PROCEDURE — 84484 ASSAY OF TROPONIN QUANT: CPT

## 2023-09-04 PROCEDURE — 99285 EMERGENCY DEPT VISIT HI MDM: CPT

## 2023-09-04 PROCEDURE — 83690 ASSAY OF LIPASE: CPT

## 2023-09-04 PROCEDURE — 6370000000 HC RX 637 (ALT 250 FOR IP): Performed by: EMERGENCY MEDICINE

## 2023-09-04 RX ORDER — HEPARIN SODIUM 1000 [USP'U]/ML
60 INJECTION, SOLUTION INTRAVENOUS; SUBCUTANEOUS PRN
Status: DISCONTINUED | OUTPATIENT
Start: 2023-09-04 | End: 2023-09-05 | Stop reason: HOSPADM

## 2023-09-04 RX ORDER — HEPARIN SODIUM 1000 [USP'U]/ML
60 INJECTION, SOLUTION INTRAVENOUS; SUBCUTANEOUS ONCE
Status: COMPLETED | OUTPATIENT
Start: 2023-09-04 | End: 2023-09-04

## 2023-09-04 RX ORDER — HEPARIN SODIUM 10000 [USP'U]/100ML
5-30 INJECTION, SOLUTION INTRAVENOUS CONTINUOUS
Status: DISCONTINUED | OUTPATIENT
Start: 2023-09-04 | End: 2023-09-05 | Stop reason: HOSPADM

## 2023-09-04 RX ORDER — ASPIRIN 81 MG/1
324 TABLET, CHEWABLE ORAL ONCE
Status: COMPLETED | OUTPATIENT
Start: 2023-09-04 | End: 2023-09-04

## 2023-09-04 RX ORDER — HEPARIN SODIUM 1000 [USP'U]/ML
30 INJECTION, SOLUTION INTRAVENOUS; SUBCUTANEOUS PRN
Status: DISCONTINUED | OUTPATIENT
Start: 2023-09-04 | End: 2023-09-05 | Stop reason: HOSPADM

## 2023-09-04 RX ORDER — FUROSEMIDE 10 MG/ML
40 INJECTION INTRAMUSCULAR; INTRAVENOUS ONCE
Status: COMPLETED | OUTPATIENT
Start: 2023-09-04 | End: 2023-09-04

## 2023-09-04 RX ADMIN — ASPIRIN 81 MG CHEWABLE TABLET 324 MG: 81 TABLET CHEWABLE at 21:55

## 2023-09-04 RX ADMIN — HEPARIN SODIUM 3670 UNITS: 1000 INJECTION INTRAVENOUS; SUBCUTANEOUS at 22:01

## 2023-09-04 RX ADMIN — FUROSEMIDE 40 MG: 10 INJECTION, SOLUTION INTRAMUSCULAR; INTRAVENOUS at 21:55

## 2023-09-04 RX ADMIN — HEPARIN SODIUM 12 UNITS/KG/HR: 10000 INJECTION, SOLUTION INTRAVENOUS at 22:03

## 2023-09-04 ASSESSMENT — PAIN - FUNCTIONAL ASSESSMENT
PAIN_FUNCTIONAL_ASSESSMENT: 0-10
PAIN_FUNCTIONAL_ASSESSMENT: NONE - DENIES PAIN

## 2023-09-04 ASSESSMENT — PAIN SCALES - GENERAL: PAINLEVEL_OUTOF10: 5

## 2023-09-04 ASSESSMENT — PAIN DESCRIPTION - DESCRIPTORS: DESCRIPTORS: ACHING

## 2023-09-04 ASSESSMENT — PAIN DESCRIPTION - PAIN TYPE: TYPE: ACUTE PAIN

## 2023-09-04 ASSESSMENT — PAIN DESCRIPTION - LOCATION: LOCATION: CHEST

## 2023-09-05 VITALS
HEIGHT: 63 IN | TEMPERATURE: 98.2 F | OXYGEN SATURATION: 97 % | RESPIRATION RATE: 23 BRPM | BODY MASS INDEX: 23.92 KG/M2 | WEIGHT: 135 LBS | SYSTOLIC BLOOD PRESSURE: 127 MMHG | HEART RATE: 93 BPM | DIASTOLIC BLOOD PRESSURE: 73 MMHG

## 2023-09-05 NOTE — ED NOTES
Report given to [facility] for transport to [facility]. All questions were answered at this time. Pt heparin was transferred to [facility] pump for transport to [facility].      Blayne Troy RN  09/05/23 [phone]

## 2023-09-05 NOTE — ED NOTES
Report called to [facility] RN at [facility]. All questions were answered at this time. Call back number given in case of more questions.      Michel Blue RN  09/05/23 [phone]

## 2023-09-05 NOTE — ED PROVIDER NOTES
Nigel Murillo [address] [facility] ENCOUNTER        Pt Name: Orland Bernheim  MRN: [id number]  [address] 4/15/1926  Date of evaluation: 9/4/2023  Provider: Syed St MD  PCP: Stephanie Olivares MD  Note Started: 9:29 PM EDT 9/4/23    CHIEF COMPLAINT       Chief Complaint   Patient presents with    Chest Pain     Pressure in chest since having stents placed at the [facility] on 8/31/23    Shortness of Breath       HISTORY OF PRESENT ILLNESS: 1 or more Elements   History From: Patient. Limitations to history : None    Orland Bernheim is a 81 y.o. female with history of renal insufficiency, hypertension, hyperlipidemia, aortic valve stenosis who presents to the ED with complaints of chest pressure and shortness of breath. She was placed two stent in her LAD on last Wednesday, by Dr. Juan Antonio Miller. She was discharged home Friday. Since Friday she is having diffuse chest pressure and shortness of breath intermittently. She is on 96% saturation at room air now. Nursing Notes were all reviewed and agreed with or any disagreements were addressed in the HPI.    ROS:   Pertinent positives and negatives are stated within HPI, all other systems reviewed and are negative.    --------------------------------------------- PAST HISTORY ---------------------------------------------  Past Medical History:  has a past medical history of Anxiety, Depression, DJD (degenerative joint disease), Dysphagia, GERD (gastroesophageal reflux disease), Hyperlipidemia, Hypertension, Hypothyroidism, Irritable bowel syndrome with constipation, Low vitamin D level, Osteoarthritis, and Valvular heart disease. Past Surgical History:  has a past surgical history that includes joint replacement; Hysterectomy; Appendectomy; Upper gastrointestinal endoscopy (10/05/2018); Endoscopy, colon, diagnostic (03/03/2014);  Cholecystectomy, laparoscopic; Carpal tunnel release (Right,

## 2023-09-06 LAB
EKG ATRIAL RATE: 88 BPM
EKG ATRIAL RATE: 94 BPM
EKG P AXIS: 47 DEGREES
EKG P AXIS: 57 DEGREES
EKG P-R INTERVAL: 154 MS
EKG P-R INTERVAL: 156 MS
EKG Q-T INTERVAL: 390 MS
EKG Q-T INTERVAL: 416 MS
EKG QRS DURATION: 134 MS
EKG QRS DURATION: 140 MS
EKG QTC CALCULATION (BAZETT): 487 MS
EKG QTC CALCULATION (BAZETT): 503 MS
EKG R AXIS: 11 DEGREES
EKG R AXIS: 27 DEGREES
EKG T AXIS: 111 DEGREES
EKG T AXIS: 91 DEGREES
EKG VENTRICULAR RATE: 88 BPM
EKG VENTRICULAR RATE: 94 BPM

## 2023-09-06 PROCEDURE — 93010 ELECTROCARDIOGRAM REPORT: CPT | Performed by: INTERNAL MEDICINE

## 2023-09-25 DIAGNOSIS — I10 HYPERTENSION, UNSPECIFIED TYPE: ICD-10-CM

## 2023-09-26 RX ORDER — SIMVASTATIN 40 MG
TABLET ORAL
Qty: 90 TABLET | Refills: 0 | Status: SHIPPED | OUTPATIENT
Start: 2023-09-26

## 2023-09-26 RX ORDER — MIRTAZAPINE 30 MG/1
30 TABLET, FILM COATED ORAL NIGHTLY
Qty: 90 TABLET | Refills: 0 | Status: SHIPPED | OUTPATIENT
Start: 2023-09-26

## 2023-09-26 RX ORDER — LOSARTAN POTASSIUM 25 MG/1
25 TABLET ORAL DAILY
Qty: 90 TABLET | Refills: 0 | Status: SHIPPED | OUTPATIENT
Start: 2023-09-26

## 2023-10-17 ENCOUNTER — OFFICE VISIT (OUTPATIENT)
Dept: PRIMARY CARE CLINIC | Age: 89
End: 2023-10-17

## 2023-10-17 VITALS
SYSTOLIC BLOOD PRESSURE: 120 MMHG | DIASTOLIC BLOOD PRESSURE: 60 MMHG | WEIGHT: 143 LBS | BODY MASS INDEX: 25.34 KG/M2 | HEIGHT: 63 IN | TEMPERATURE: 97.5 F | HEART RATE: 80 BPM | OXYGEN SATURATION: 96 %

## 2023-10-17 DIAGNOSIS — R53.1 WEAKNESS: ICD-10-CM

## 2023-10-17 DIAGNOSIS — E03.9 ACQUIRED HYPOTHYROIDISM: ICD-10-CM

## 2023-10-17 DIAGNOSIS — I25.10 ASHD (ARTERIOSCLEROTIC HEART DISEASE): ICD-10-CM

## 2023-10-17 DIAGNOSIS — I35.0 NONRHEUMATIC AORTIC VALVE STENOSIS: ICD-10-CM

## 2023-10-17 DIAGNOSIS — I95.9 HYPOTENSION, UNSPECIFIED HYPOTENSION TYPE: Primary | ICD-10-CM

## 2023-10-17 RX ORDER — GABAPENTIN 100 MG/1
100 CAPSULE ORAL 2 TIMES DAILY
Qty: 180 CAPSULE | Refills: 0 | Status: SHIPPED | OUTPATIENT
Start: 2023-10-17 | End: 2024-01-15

## 2023-10-17 RX ORDER — HYDROGEN PEROXIDE 2.65 ML/100ML
81 LIQUID ORAL; TOPICAL DAILY
COMMUNITY
Start: 2023-09-01

## 2023-10-17 RX ORDER — CLOPIDOGREL BISULFATE 75 MG/1
75 TABLET ORAL DAILY
COMMUNITY
Start: 2023-08-30 | End: 2023-10-17 | Stop reason: SDUPTHER

## 2023-10-17 RX ORDER — LORATADINE 10 MG/1
10 TABLET ORAL DAILY
COMMUNITY

## 2023-10-17 RX ORDER — TRAZODONE HYDROCHLORIDE 50 MG/1
0.5 TABLET ORAL DAILY
COMMUNITY
Start: 2023-10-07 | End: 2023-10-17 | Stop reason: SDUPTHER

## 2023-10-17 RX ORDER — ATORVASTATIN CALCIUM 40 MG/1
40 TABLET, FILM COATED ORAL DAILY
COMMUNITY
Start: 2023-09-11

## 2023-10-17 RX ORDER — SPIRONOLACTONE 25 MG/1
25 TABLET ORAL DAILY
Qty: 90 TABLET | Refills: 0 | Status: SHIPPED | OUTPATIENT
Start: 2023-10-17

## 2023-10-17 RX ORDER — DOCUSATE SODIUM 100 MG/1
100 CAPSULE, LIQUID FILLED ORAL 2 TIMES DAILY
COMMUNITY

## 2023-10-17 RX ORDER — CLOPIDOGREL BISULFATE 75 MG/1
75 TABLET ORAL DAILY
Qty: 90 TABLET | Refills: 0 | Status: SHIPPED | OUTPATIENT
Start: 2023-10-17

## 2023-10-17 RX ORDER — SPIRONOLACTONE 25 MG/1
25 TABLET ORAL DAILY
COMMUNITY
Start: 2023-09-11 | End: 2023-10-17 | Stop reason: SDUPTHER

## 2023-10-17 RX ORDER — FUROSEMIDE 40 MG/1
40 TABLET ORAL DAILY
COMMUNITY
Start: 2023-09-11

## 2023-10-17 RX ORDER — TRAZODONE HYDROCHLORIDE 50 MG/1
25 TABLET ORAL DAILY
Qty: 45 TABLET | Refills: 0 | Status: SHIPPED | OUTPATIENT
Start: 2023-10-17

## 2023-10-17 RX ORDER — ACETAMINOPHEN 500 MG
1000 TABLET ORAL NIGHTLY PRN
COMMUNITY

## 2023-10-17 NOTE — PROGRESS NOTES
Lisa Espinosa presents today for follow up of HTN, High chol, Recent MI, daughter referring bp has been too low    Current Outpatient Medications   Medication Sig Dispense Refill    atorvastatin (LIPITOR) 40 MG tablet Take 1 tablet by mouth daily      furosemide (LASIX) 40 MG tablet Take 1 tablet by mouth daily      EQ ASPIRIN ADULT LOW DOSE 81 MG EC tablet Take 1 tablet by mouth daily      docusate sodium (COLACE) 100 MG capsule Take 1 capsule by mouth 2 times daily      loratadine (CLARITIN) 10 MG tablet Take 1 tablet by mouth daily      acetaminophen (TYLENOL) 500 MG tablet Take 2 tablets by mouth nightly as needed for Pain      clopidogrel (PLAVIX) 75 MG tablet Take 1 tablet by mouth daily 90 tablet 0    spironolactone (ALDACTONE) 25 MG tablet Take 1 tablet by mouth daily 90 tablet 0    traZODone (DESYREL) 50 MG tablet Take 0.5 tablets by mouth daily 45 tablet 0    gabapentin (NEURONTIN) 100 MG capsule Take 1 capsule by mouth in the morning and at bedtime for 90 days.  180 capsule 0    mirtazapine (REMERON) 30 MG tablet Take 1 tablet by mouth nightly (Patient taking differently: Take 0.5 tablets by mouth nightly) 90 tablet 0    buPROPion (WELLBUTRIN XL) 150 MG extended release tablet Take 1 tablet by mouth once daily 90 tablet 0    omeprazole (PRILOSEC) 40 MG delayed release capsule Take 1 capsule by mouth once daily 90 capsule 0    levothyroxine (SYNTHROID) 100 MCG tablet Take 1 tablet by mouth once daily 90 tablet 0    Multiple Vitamin (MULTI-VITAMIN DAILY PO) Take by mouth daily      Multiple Vitamins-Minerals (PRESERVISION AREDS 2 PO) Take by mouth in the morning and at bedtime      metoprolol tartrate (LOPRESSOR) 25 MG tablet Take 0.5 tablets by mouth at bedtime 15 tablet 3    diclofenac sodium (VOLTAREN) 1 % GEL Apply 2 g topically 2 times daily 350 g 1    Biotin 1000 MCG TABS Take 1 tablet by mouth daily      Calcium-Vitamin D 500-125 MG-UNIT TABS Take 1 tablet by mouth daily       losartan (COZAAR) 25

## 2023-10-18 DIAGNOSIS — I21.A9 OTHER TYPE OF MYOCARDIAL INFARCTION (HCC): Primary | ICD-10-CM

## 2023-10-19 ENCOUNTER — TELEPHONE (OUTPATIENT)
Dept: ADMINISTRATIVE | Age: 89
End: 2023-10-19

## 2023-10-19 NOTE — TELEPHONE ENCOUNTER
Pt has referrel in [facility]  DX IOther type of myocardial infarction ([address])  [address] [place])  5/10/23  please advise pt

## 2023-10-20 PROBLEM — I21.3 ST ELEVATION MYOCARDIAL INFARCTION (STEMI) (HCC): Status: ACTIVE | Noted: 2023-10-20

## 2023-10-20 ASSESSMENT — ENCOUNTER SYMPTOMS
ALLERGIC/IMMUNOLOGIC NEGATIVE: 1
ABDOMINAL PAIN: 0
ABDOMINAL DISTENTION: 0
TROUBLE SWALLOWING: 0
COUGH: 0
BLOOD IN STOOL: 0
DIARRHEA: 0
SINUS PRESSURE: 0
NAUSEA: 0
BACK PAIN: 0
RHINORRHEA: 0
SORE THROAT: 0
VOMITING: 0
COLOR CHANGE: 0
CONSTIPATION: 0

## 2023-10-22 DIAGNOSIS — N64.4 MASTODYNIA: ICD-10-CM

## 2023-10-22 DIAGNOSIS — N61.0 NIPPLE INFLAMMATION: ICD-10-CM

## 2023-10-23 RX ORDER — OMEPRAZOLE 40 MG/1
CAPSULE, DELAYED RELEASE ORAL
Qty: 90 CAPSULE | Refills: 0 | Status: SHIPPED | OUTPATIENT
Start: 2023-10-23

## 2023-10-23 RX ORDER — LEVOTHYROXINE SODIUM 0.1 MG/1
TABLET ORAL
Qty: 90 TABLET | Refills: 0 | Status: SHIPPED | OUTPATIENT
Start: 2023-10-23

## 2023-10-29 DIAGNOSIS — F41.9 ANXIETY: ICD-10-CM

## 2023-10-30 RX ORDER — BUPROPION HYDROCHLORIDE 150 MG/1
TABLET ORAL
Qty: 90 TABLET | Refills: 0 | Status: SHIPPED | OUTPATIENT
Start: 2023-10-30

## 2023-11-14 ENCOUNTER — OFFICE VISIT (OUTPATIENT)
Dept: PRIMARY CARE CLINIC | Age: 89
End: 2023-11-14

## 2023-11-14 VITALS
BODY MASS INDEX: 26.05 KG/M2 | WEIGHT: 147 LBS | TEMPERATURE: 97.5 F | HEIGHT: 63 IN | HEART RATE: 80 BPM | OXYGEN SATURATION: 92 % | DIASTOLIC BLOOD PRESSURE: 68 MMHG | SYSTOLIC BLOOD PRESSURE: 148 MMHG

## 2023-11-14 DIAGNOSIS — I10 HYPERTENSION, UNSPECIFIED TYPE: ICD-10-CM

## 2023-11-14 DIAGNOSIS — N18.32 STAGE 3B CHRONIC KIDNEY DISEASE (HCC): ICD-10-CM

## 2023-11-14 DIAGNOSIS — I35.0 NONRHEUMATIC AORTIC VALVE STENOSIS: ICD-10-CM

## 2023-11-14 DIAGNOSIS — R53.1 WEAKNESS: ICD-10-CM

## 2023-11-14 DIAGNOSIS — E03.9 ACQUIRED HYPOTHYROIDISM: ICD-10-CM

## 2023-11-14 DIAGNOSIS — I21.3 ST ELEVATION MYOCARDIAL INFARCTION (STEMI), UNSPECIFIED ARTERY (HCC): ICD-10-CM

## 2023-11-14 DIAGNOSIS — Z23 NEEDS FLU SHOT: Primary | ICD-10-CM

## 2023-11-14 PROBLEM — N18.31 STAGE 3A CHRONIC KIDNEY DISEASE (HCC): Status: ACTIVE | Noted: 2023-08-31

## 2023-11-14 PROBLEM — Z95.5 S/P CORONARY ARTERY STENT PLACEMENT: Status: ACTIVE | Noted: 2023-08-31

## 2023-11-14 PROBLEM — I44.7 LBBB (LEFT BUNDLE BRANCH BLOCK): Status: ACTIVE | Noted: 2023-08-31

## 2023-11-14 PROBLEM — I50.9 ACUTE DECOMPENSATED HEART FAILURE (HCC): Status: ACTIVE | Noted: 2023-09-05

## 2023-11-14 LAB
ALBUMIN SERPL-MCNC: 4.4 G/DL (ref 3.5–5.2)
ALP BLD-CCNC: 82 U/L (ref 35–104)
ALT SERPL-CCNC: 21 U/L (ref 0–32)
ANION GAP SERPL CALCULATED.3IONS-SCNC: 13 MMOL/L (ref 7–16)
AST SERPL-CCNC: 27 U/L (ref 0–31)
BILIRUB SERPL-MCNC: 0.2 MG/DL (ref 0–1.2)
BUN BLDV-MCNC: 24 MG/DL (ref 6–23)
CALCIUM SERPL-MCNC: 9.4 MG/DL (ref 8.6–10.2)
CHLORIDE BLD-SCNC: 99 MMOL/L (ref 98–107)
CO2: 26 MMOL/L (ref 22–29)
CREAT SERPL-MCNC: 1.2 MG/DL (ref 0.5–1)
GFR SERPL CREATININE-BSD FRML MDRD: 40 ML/MIN/1.73M2
GLUCOSE FASTING: 115 MG/DL (ref 74–99)
POTASSIUM SERPL-SCNC: 4.9 MMOL/L (ref 3.5–5)
SODIUM BLD-SCNC: 138 MMOL/L (ref 132–146)
TOTAL PROTEIN: 7.1 G/DL (ref 6.4–8.3)
TSH SERPL DL<=0.05 MIU/L-ACNC: 8.64 UIU/ML (ref 0.27–4.2)

## 2023-11-15 ENCOUNTER — TELEPHONE (OUTPATIENT)
Dept: ADMINISTRATIVE | Age: 89
End: 2023-11-15

## 2023-11-15 ASSESSMENT — ENCOUNTER SYMPTOMS
TROUBLE SWALLOWING: 0
SORE THROAT: 0
BACK PAIN: 0
BLOOD IN STOOL: 0
SINUS PRESSURE: 0
NAUSEA: 0
ABDOMINAL DISTENTION: 0
RHINORRHEA: 0
DIARRHEA: 0
CONSTIPATION: 0
VOMITING: 0
ABDOMINAL PAIN: 0
COLOR CHANGE: 0
ALLERGIC/IMMUNOLOGIC NEGATIVE: 1

## 2023-11-15 NOTE — TELEPHONE ENCOUNTER
Pt's daughter Star calling at the request of PCP; referral in the chart (for an apt with \"Dr. Arcelia More") s/p MI - cath at [facility]. Pt is requesting to f/u with [facility] hasn't seen [facility] in over 1 mth and wants to f/u with [facility]. Please return her call with an apt. Thank you.

## 2023-11-16 RX ORDER — LEVOTHYROXINE SODIUM 112 MCG
112 TABLET ORAL DAILY
Qty: 90 TABLET | Refills: 0 | Status: SHIPPED | OUTPATIENT
Start: 2023-11-16

## 2023-11-16 NOTE — PROGRESS NOTES
Lucia Elliott says she is taking her thyroid medication every day because she puts her pills together for her. Notified that Dr would be sending higher dose to pharmacy.

## 2023-11-20 ENCOUNTER — TELEPHONE (OUTPATIENT)
Dept: PRIMARY CARE CLINIC | Age: 89
End: 2023-11-20

## 2023-11-20 NOTE — TELEPHONE ENCOUNTER
Pt's dtr, Ben, called today about pt's scrip for Synthroid. She asked if pt could go back to generic form of med, name brand is too expensive for pt.  Med has been ordered both ways in emr, most recent name brand

## 2023-11-21 RX ORDER — LEVOTHYROXINE SODIUM 112 UG/1
112 TABLET ORAL DAILY
Qty: 30 TABLET | Refills: 0 | Status: SHIPPED | OUTPATIENT
Start: 2023-11-21

## 2023-12-01 ENCOUNTER — TELEPHONE (OUTPATIENT)
Dept: PRIMARY CARE CLINIC | Age: 89
End: 2023-12-01

## 2023-12-01 NOTE — TELEPHONE ENCOUNTER
Patients daughter requesting a call back 21 [phone] to let her know if they should increase Liane's Furosemide back up to 40 mg a couple days a week due  to a weight gain 6 pounds in the last month and 3 pounds of that is in the last week. They have weaned her down to 20 mg of furosemide. No pitting or edema, not short of breath. Just wants to be cautious and make sure she is doing the right thing. Thank you.

## 2024-01-05 DIAGNOSIS — I10 HYPERTENSION, UNSPECIFIED TYPE: ICD-10-CM

## 2024-01-05 RX ORDER — OMEPRAZOLE 40 MG/1
CAPSULE, DELAYED RELEASE ORAL
Qty: 90 CAPSULE | Refills: 0 | OUTPATIENT
Start: 2024-01-05

## 2024-01-05 RX ORDER — LOSARTAN POTASSIUM 25 MG/1
25 TABLET ORAL DAILY
Qty: 90 TABLET | Refills: 0 | OUTPATIENT
Start: 2024-01-05

## 2024-01-19 ENCOUNTER — OFFICE VISIT (OUTPATIENT)
Dept: CARDIOLOGY CLINIC | Age: 89
End: 2024-01-19
Payer: MEDICARE

## 2024-01-19 VITALS
HEART RATE: 75 BPM | WEIGHT: 147.4 LBS | DIASTOLIC BLOOD PRESSURE: 62 MMHG | HEIGHT: 63 IN | BODY MASS INDEX: 26.12 KG/M2 | RESPIRATION RATE: 14 BRPM | SYSTOLIC BLOOD PRESSURE: 118 MMHG

## 2024-01-19 DIAGNOSIS — I50.22 CHRONIC HFREF (HEART FAILURE WITH REDUCED EJECTION FRACTION) (HCC): ICD-10-CM

## 2024-01-19 DIAGNOSIS — I42.9 CARDIOMYOPATHY, UNSPECIFIED TYPE (HCC): ICD-10-CM

## 2024-01-19 DIAGNOSIS — E03.9 ACQUIRED HYPOTHYROIDISM: ICD-10-CM

## 2024-01-19 DIAGNOSIS — I35.0 NONRHEUMATIC AORTIC VALVE STENOSIS: Primary | ICD-10-CM

## 2024-01-19 DIAGNOSIS — K21.9 GASTROESOPHAGEAL REFLUX DISEASE WITHOUT ESOPHAGITIS: ICD-10-CM

## 2024-01-19 DIAGNOSIS — E78.5 DYSLIPIDEMIA: ICD-10-CM

## 2024-01-19 DIAGNOSIS — I25.10 CORONARY ARTERY DISEASE INVOLVING NATIVE CORONARY ARTERY OF NATIVE HEART WITHOUT ANGINA PECTORIS: ICD-10-CM

## 2024-01-19 DIAGNOSIS — I44.7 LBBB (LEFT BUNDLE BRANCH BLOCK): ICD-10-CM

## 2024-01-19 DIAGNOSIS — I10 PRIMARY HYPERTENSION: ICD-10-CM

## 2024-01-19 PROCEDURE — 93000 ELECTROCARDIOGRAM COMPLETE: CPT | Performed by: INTERNAL MEDICINE

## 2024-01-19 PROCEDURE — 1123F ACP DISCUSS/DSCN MKR DOCD: CPT | Performed by: INTERNAL MEDICINE

## 2024-01-19 PROCEDURE — 99214 OFFICE O/P EST MOD 30 MIN: CPT | Performed by: INTERNAL MEDICINE

## 2024-01-19 RX ORDER — FUROSEMIDE 40 MG/1
40 TABLET ORAL DAILY
Qty: 90 TABLET | Refills: 0 | Status: SHIPPED | OUTPATIENT
Start: 2024-01-19

## 2024-01-19 RX ORDER — OMEPRAZOLE 40 MG/1
40 CAPSULE, DELAYED RELEASE ORAL DAILY
Qty: 90 CAPSULE | Refills: 0 | Status: SHIPPED | OUTPATIENT
Start: 2024-01-19

## 2024-01-19 RX ORDER — METOPROLOL SUCCINATE 25 MG/1
12.5 TABLET, EXTENDED RELEASE ORAL DAILY
Qty: 45 TABLET | Refills: 1 | Status: SHIPPED | OUTPATIENT
Start: 2024-01-19

## 2024-01-19 RX ORDER — TRAZODONE HYDROCHLORIDE 50 MG/1
25 TABLET ORAL DAILY
Qty: 45 TABLET | Refills: 0 | Status: SHIPPED | OUTPATIENT
Start: 2024-01-19

## 2024-01-19 RX ORDER — SPIRONOLACTONE 25 MG/1
12.5 TABLET ORAL DAILY
Qty: 45 TABLET | Refills: 2 | Status: SHIPPED | OUTPATIENT
Start: 2024-01-19

## 2024-01-19 NOTE — TELEPHONE ENCOUNTER
Patients daughter requesting the following medications :    omeprazole (PRILOSEC) 40 MG delayed release capsule       traZODone (DESYREL) 50 MG tablet     metoprolol tartrate (LOPRESSOR) 25 MG tablet     furosemide (LASIX) 40 MG tablet     Please send to [facility] in [place].  Thank you.

## 2024-01-19 NOTE — PROGRESS NOTES
Statin    Chronic HFrEF (heart failure with reduced ejection fraction) (HCC) - EF 37 +/-5&% by Echo 9/2023  -     Echo (TTE) limited (PRN contrast/bubble/strain/3D); Future    Cardiomyopathy, unspecified type (HCC) - Continue low dose GDMT  -     Echo (TTE) limited (PRN contrast/bubble/strain/3D); Future    LBBB (left bundle branch block) - Chronic     Primary hypertension - Monitor BP     Acquired hypothyroidism - On HRT    Dyslipidemia - On Statin    Hx peptic stricture - s/p dilatation 4/17/2023    Large hiatal hernia    Gastroesophageal reflux disease without esophagitis     Preventive Cardiology: N/A due to her advanced age. Low cholesterol diet, regular exercise as tolerate discussed.    Other orders  -     EKG 12 lead  -     spironolactone (ALDACTONE) 25 MG tablet; Take 0.5 tablets by mouth daily  -     metoprolol succinate (TOPROL XL) 25 MG extended release tablet; Take 0.5 tablets by mouth daily     Above recommendations discussed with her and her daughter and all questions answered.   The patient's current medication list, allergies, problem list and results of prior tests (as available) were reviewed at today's visit   All questions answered about cardiac diagnoses and cardiac medications.   Continue current medications. Monitor BP and heart rates.              Compliance with medications and f/u with all physicians discussed.   Risk factor modification based on risk profile discussed.   Call if any exertional chest pain, short of breath, dizzy or palpitations.   Follow up in 3-4 months or earlier if needed.         [facility]  [address] [place] [address]  [phone]

## 2024-01-19 NOTE — TELEPHONE ENCOUNTER
Last Appointment:  12/20/2023  Future Appointments   Date Time Provider Department Center   1/19/2024 11:30 AM [facility]   2/9/2024 11:15 AM Linda Santiago MD CBURG PC [facility]

## 2024-01-22 RX ORDER — GABAPENTIN 100 MG/1
100 CAPSULE ORAL 2 TIMES DAILY
Qty: 180 CAPSULE | Refills: 0 | Status: SHIPPED | OUTPATIENT
Start: 2024-01-22 | End: 2024-04-21

## 2024-02-07 ENCOUNTER — OFFICE VISIT (OUTPATIENT)
Dept: PRIMARY CARE CLINIC | Age: 89
End: 2024-02-07
Payer: MEDICARE

## 2024-02-07 VITALS
SYSTOLIC BLOOD PRESSURE: 136 MMHG | TEMPERATURE: 97.1 F | HEIGHT: 63 IN | HEART RATE: 79 BPM | WEIGHT: 153 LBS | DIASTOLIC BLOOD PRESSURE: 62 MMHG | OXYGEN SATURATION: 97 % | BODY MASS INDEX: 27.11 KG/M2

## 2024-02-07 DIAGNOSIS — R60.9 EDEMA, UNSPECIFIED TYPE: ICD-10-CM

## 2024-02-07 DIAGNOSIS — I10 HYPERTENSION, UNSPECIFIED TYPE: ICD-10-CM

## 2024-02-07 DIAGNOSIS — N18.31 STAGE 3A CHRONIC KIDNEY DISEASE (HCC): ICD-10-CM

## 2024-02-07 DIAGNOSIS — I25.10 ASHD (ARTERIOSCLEROTIC HEART DISEASE): ICD-10-CM

## 2024-02-07 DIAGNOSIS — E03.9 ACQUIRED HYPOTHYROIDISM: ICD-10-CM

## 2024-02-07 DIAGNOSIS — I35.0 NONRHEUMATIC AORTIC VALVE STENOSIS: Primary | ICD-10-CM

## 2024-02-07 DIAGNOSIS — E78.5 SERUM LIPIDS HIGH: ICD-10-CM

## 2024-02-07 LAB
ALBUMIN SERPL-MCNC: 4.3 G/DL (ref 3.5–5.2)
ALP BLD-CCNC: 77 U/L (ref 35–104)
ALT SERPL-CCNC: 27 U/L (ref 0–32)
ANION GAP SERPL CALCULATED.3IONS-SCNC: 11 MMOL/L (ref 7–16)
AST SERPL-CCNC: 30 U/L (ref 0–31)
BILIRUB SERPL-MCNC: 0.3 MG/DL (ref 0–1.2)
BUN BLDV-MCNC: 26 MG/DL (ref 6–23)
CALCIUM SERPL-MCNC: 9 MG/DL (ref 8.6–10.2)
CHLORIDE BLD-SCNC: 95 MMOL/L (ref 98–107)
CO2: 31 MMOL/L (ref 22–29)
CREAT SERPL-MCNC: 1.3 MG/DL (ref 0.5–1)
GFR SERPL CREATININE-BSD FRML MDRD: 39 ML/MIN/1.73M2
GLUCOSE FASTING: 87 MG/DL (ref 74–99)
POTASSIUM SERPL-SCNC: 4.4 MMOL/L (ref 3.5–5)
SODIUM BLD-SCNC: 137 MMOL/L (ref 132–146)
TOTAL PROTEIN: 7 G/DL (ref 6.4–8.3)
TSH SERPL DL<=0.05 MIU/L-ACNC: 7.69 UIU/ML (ref 0.27–4.2)

## 2024-02-07 PROCEDURE — 1123F ACP DISCUSS/DSCN MKR DOCD: CPT | Performed by: INTERNAL MEDICINE

## 2024-02-07 PROCEDURE — 99214 OFFICE O/P EST MOD 30 MIN: CPT | Performed by: INTERNAL MEDICINE

## 2024-02-07 NOTE — PROGRESS NOTES
Liane J Jhony presents today for for follow up of HTN, ASHD, Hypothyroidism, Aortic Stenosis    Current Outpatient Medications   Medication Sig Dispense Refill    gabapentin (NEURONTIN) 100 MG capsule Take 1 capsule by mouth 2 times daily for 90 days. 180 capsule 0    traZODone (DESYREL) 50 MG tablet Take 0.5 tablets by mouth daily 45 tablet 0    omeprazole (PRILOSEC) 40 MG delayed release capsule Take 1 capsule by mouth daily 90 capsule 0    furosemide (LASIX) 40 MG tablet Take 1 tablet by mouth daily 90 tablet 0    spironolactone (ALDACTONE) 25 MG tablet Take 0.5 tablets by mouth daily 45 tablet 2    metoprolol succinate (TOPROL XL) 25 MG extended release tablet Take 0.5 tablets by mouth daily 45 tablet 1    atorvastatin (LIPITOR) 40 MG tablet Take 1 tablet by mouth daily 90 tablet 0    losartan (COZAAR) 25 MG tablet Take 0.5 tablets by mouth daily 45 tablet 0    levothyroxine (SYNTHROID) 112 MCG tablet Take 1 tablet by mouth daily 30 tablet 0    buPROPion (WELLBUTRIN XL) 150 MG extended release tablet Take 1 tablet by mouth once daily 90 tablet 0    EQ ASPIRIN ADULT LOW DOSE 81 MG EC tablet Take 1 tablet by mouth daily      docusate sodium (COLACE) 100 MG capsule Take 1 capsule by mouth 2 times daily      loratadine (CLARITIN) 10 MG tablet Take 1 tablet by mouth daily      clopidogrel (PLAVIX) 75 MG tablet Take 1 tablet by mouth daily 90 tablet 0    mirtazapine (REMERON) 30 MG tablet Take 1 tablet by mouth nightly (Patient taking differently: Take 0.5 tablets by mouth nightly) 90 tablet 0    Multiple Vitamin (MULTI-VITAMIN DAILY PO) Take by mouth daily      Multiple Vitamins-Minerals (PRESERVISION AREDS 2 PO) Take by mouth in the morning and at bedtime      diclofenac sodium (VOLTAREN) 1 % GEL Apply 2 g topically 2 times daily 350 g 1    Biotin 1000 MCG TABS Take 1 tablet by mouth daily      Calcium-Vitamin D 500-125 MG-UNIT TABS Take 1 tablet by mouth daily       levothyroxine (SYNTHROID) 125 MCG tablet Take

## 2024-02-08 RX ORDER — LEVOTHYROXINE SODIUM 0.12 MG/1
125 TABLET ORAL DAILY
Qty: 90 TABLET | Refills: 0 | Status: SHIPPED | OUTPATIENT
Start: 2024-02-08

## 2024-02-09 ASSESSMENT — ENCOUNTER SYMPTOMS
NAUSEA: 0
TROUBLE SWALLOWING: 0
COLOR CHANGE: 0
ABDOMINAL DISTENTION: 0
SORE THROAT: 0
RHINORRHEA: 0
VOMITING: 0
CONSTIPATION: 0
ALLERGIC/IMMUNOLOGIC NEGATIVE: 1
DIARRHEA: 0
SINUS PRESSURE: 0
BACK PAIN: 0
ABDOMINAL PAIN: 0
BLOOD IN STOOL: 0

## 2024-02-15 DIAGNOSIS — F41.9 ANXIETY: ICD-10-CM

## 2024-02-16 RX ORDER — BUPROPION HYDROCHLORIDE 150 MG/1
TABLET ORAL
Qty: 90 TABLET | Refills: 0 | Status: SHIPPED | OUTPATIENT
Start: 2024-02-16

## 2024-03-15 DIAGNOSIS — I50.22 CHRONIC HFREF (HEART FAILURE WITH REDUCED EJECTION FRACTION) (HCC): ICD-10-CM

## 2024-03-15 DIAGNOSIS — I25.10 CORONARY ARTERY DISEASE INVOLVING NATIVE CORONARY ARTERY OF NATIVE HEART WITHOUT ANGINA PECTORIS: ICD-10-CM

## 2024-03-15 RX ORDER — ATORVASTATIN CALCIUM 40 MG/1
40 TABLET, FILM COATED ORAL DAILY
Qty: 90 TABLET | Refills: 0 | Status: SHIPPED | OUTPATIENT
Start: 2024-03-15

## 2024-03-15 RX ORDER — METOPROLOL SUCCINATE 25 MG/1
12.5 TABLET, EXTENDED RELEASE ORAL DAILY
Qty: 45 TABLET | Refills: 1 | Status: SHIPPED | OUTPATIENT
Start: 2024-03-15

## 2024-03-15 RX ORDER — GABAPENTIN 100 MG/1
100 CAPSULE ORAL 2 TIMES DAILY
Qty: 180 CAPSULE | Refills: 0 | Status: SHIPPED | OUTPATIENT
Start: 2024-03-15 | End: 2024-06-13

## 2024-03-15 NOTE — TELEPHONE ENCOUNTER
Pt daughter called in requesting refill on    atorvastatin (LIPITOR) 40 MG tablet   metoprolol succinate (TOPROL XL) 25 MG extended release tablet   gabapentin (NEURONTIN) 100 MG capsule     [facility] pharmacy on [address]

## 2024-03-22 ENCOUNTER — HOSPITAL ENCOUNTER (OUTPATIENT)
Dept: CARDIOLOGY | Age: 89
End: 2024-03-22
Attending: INTERNAL MEDICINE
Payer: MEDICARE

## 2024-03-22 VITALS
WEIGHT: 155 LBS | HEIGHT: 63 IN | SYSTOLIC BLOOD PRESSURE: 118 MMHG | BODY MASS INDEX: 27.46 KG/M2 | DIASTOLIC BLOOD PRESSURE: 62 MMHG

## 2024-03-22 DIAGNOSIS — I35.0 NONRHEUMATIC AORTIC VALVE STENOSIS: ICD-10-CM

## 2024-03-22 DIAGNOSIS — I50.22 CHRONIC HFREF (HEART FAILURE WITH REDUCED EJECTION FRACTION) (HCC): ICD-10-CM

## 2024-03-22 DIAGNOSIS — I42.9 CARDIOMYOPATHY, UNSPECIFIED TYPE (HCC): ICD-10-CM

## 2024-03-22 LAB
ECHO AR MAX VEL PISA: 4.2 M/S
ECHO AV AREA PEAK VELOCITY: 1.1 CM2
ECHO AV AREA VTI: 1 CM2
ECHO AV AREA/BSA PEAK VELOCITY: 0.6 CM2/M2
ECHO AV AREA/BSA VTI: 0.6 CM2/M2
ECHO AV CUSP MM: 0.5 CM
ECHO AV MEAN GRADIENT: 41 MMHG
ECHO AV MEAN VELOCITY: 3.2 M/S
ECHO AV PEAK GRADIENT: 69 MMHG
ECHO AV PEAK VELOCITY: 4.1 M/S
ECHO AV REGURGITANT PHT: 566.8 MILLISECOND
ECHO AV VELOCITY RATIO: 0.22
ECHO AV VTI: 91 CM
ECHO BSA: 1.77 M2
ECHO EST RA PRESSURE: 3 MMHG
ECHO LA DIAMETER INDEX: 1.95 CM/M2
ECHO LA DIAMETER: 3.4 CM
ECHO LA VOL A-L A2C: 88 ML (ref 22–52)
ECHO LA VOL A-L A4C: 77 ML (ref 22–52)
ECHO LA VOL MOD A2C: 83 ML (ref 22–52)
ECHO LA VOL MOD A4C: 69 ML (ref 22–52)
ECHO LA VOLUME AREA LENGTH: 88 ML
ECHO LA VOLUME INDEX A-L A2C: 51 ML/M2 (ref 16–34)
ECHO LA VOLUME INDEX A-L A4C: 44 ML/M2 (ref 16–34)
ECHO LA VOLUME INDEX AREA LENGTH: 51 ML/M2 (ref 16–34)
ECHO LA VOLUME INDEX MOD A2C: 48 ML/M2 (ref 16–34)
ECHO LA VOLUME INDEX MOD A4C: 40 ML/M2 (ref 16–34)
ECHO LV FRACTIONAL SHORTENING: 28 % (ref 28–44)
ECHO LV INTERNAL DIMENSION DIASTOLE INDEX: 2.24 CM/M2
ECHO LV INTERNAL DIMENSION DIASTOLIC: 3.9 CM (ref 3.9–5.3)
ECHO LV INTERNAL DIMENSION SYSTOLIC INDEX: 1.61 CM/M2
ECHO LV INTERNAL DIMENSION SYSTOLIC: 2.8 CM
ECHO LV IVSD: 1.3 CM (ref 0.6–0.9)
ECHO LV MASS 2D: 179.7 G (ref 67–162)
ECHO LV MASS INDEX 2D: 103.3 G/M2 (ref 43–95)
ECHO LV POSTERIOR WALL DIASTOLIC: 1.3 CM (ref 0.6–0.9)
ECHO LV RELATIVE WALL THICKNESS RATIO: 0.67
ECHO LVOT AREA: 5.3 CM2
ECHO LVOT AV VTI INDEX: 0.18
ECHO LVOT DIAM: 2.6 CM
ECHO LVOT MEAN GRADIENT: 2 MMHG
ECHO LVOT PEAK GRADIENT: 3 MMHG
ECHO LVOT PEAK VELOCITY: 0.9 M/S
ECHO LVOT STROKE VOLUME INDEX: 50.9 ML/M2
ECHO LVOT SV: 88.6 ML
ECHO LVOT VTI: 16.7 CM
ECHO MV A VELOCITY: 1.35 M/S
ECHO MV AREA PHT: 2.7 CM2
ECHO MV AREA VTI: 2.3 CM2
ECHO MV E DECELERATION TIME (DT): 236.8 MS
ECHO MV E VELOCITY: 1.07 M/S
ECHO MV E/A RATIO: 0.79
ECHO MV EROA PISA: 0.2 CM2
ECHO MV LVOT VTI INDEX: 2.32
ECHO MV MAX VELOCITY: 1.7 M/S
ECHO MV MEAN GRADIENT: 3 MMHG
ECHO MV MEAN VELOCITY: 0.9 M/S
ECHO MV PEAK GRADIENT: 11 MMHG
ECHO MV PRESSURE HALF TIME (PHT): 81.9 MS
ECHO MV REGURGITANT ALIASING (NYQUIST) VELOCITY: 25 CM/S
ECHO MV REGURGITANT RADIUS PISA: 0.78 CM
ECHO MV REGURGITANT VELOCITY PISA: 5.8 M/S
ECHO MV REGURGITANT VOLUME PISA: 30.35 ML
ECHO MV REGURGITANT VTIA: 184.3 CM
ECHO MV VTI: 38.8 CM
ECHO PV MAX VELOCITY: 0.7 M/S
ECHO PV MEAN GRADIENT: 1 MMHG
ECHO PV MEAN VELOCITY: 0.4 M/S
ECHO PV PEAK GRADIENT: 2 MMHG
ECHO PV VTI: 12.1 CM
ECHO RIGHT VENTRICULAR SYSTOLIC PRESSURE (RVSP): 34 MMHG
ECHO RV INTERNAL DIMENSION: 1.8 CM
ECHO TV REGURGITANT MAX VELOCITY: 2.8 M/S
ECHO TV REGURGITANT PEAK GRADIENT: 32 MMHG
LEFT VENTRICULAR EJECTION FRACTION HIGH VALUE: 48 %
LEFT VENTRICULAR EJECTION FRACTION MODE: NORMAL
LV EF: 45 %

## 2024-03-22 PROCEDURE — 93321 DOPPLER ECHO F-UP/LMTD STD: CPT | Performed by: INTERNAL MEDICINE

## 2024-03-22 PROCEDURE — 93308 TTE F-UP OR LMTD: CPT

## 2024-03-22 PROCEDURE — 93308 TTE F-UP OR LMTD: CPT | Performed by: INTERNAL MEDICINE

## 2024-03-22 PROCEDURE — 93325 DOPPLER ECHO COLOR FLOW MAPG: CPT | Performed by: INTERNAL MEDICINE

## 2024-03-25 ENCOUNTER — TELEPHONE (OUTPATIENT)
Dept: CARDIOLOGY CLINIC | Age: 89
End: 2024-03-25

## 2024-03-25 NOTE — TELEPHONE ENCOUNTER
Shaylee Salgado MD McGee, Shelia, MA  Tell her that her heart function bit improved from 10/2023 Echo from [place]. EF 45-48% now compared to 37%.  Aortic valve still severely narrowed.    Called patient gave instructions

## 2024-04-10 ENCOUNTER — OFFICE VISIT (OUTPATIENT)
Dept: PRIMARY CARE CLINIC | Age: 89
End: 2024-04-10
Payer: MEDICARE

## 2024-04-10 VITALS — BODY MASS INDEX: 27.28 KG/M2 | WEIGHT: 154 LBS

## 2024-04-10 DIAGNOSIS — M25.512 ACUTE PAIN OF LEFT SHOULDER: Primary | ICD-10-CM

## 2024-04-10 DIAGNOSIS — I50.22 CHRONIC HFREF (HEART FAILURE WITH REDUCED EJECTION FRACTION) (HCC): ICD-10-CM

## 2024-04-10 DIAGNOSIS — I25.10 CORONARY ARTERY DISEASE INVOLVING NATIVE CORONARY ARTERY OF NATIVE HEART WITHOUT ANGINA PECTORIS: ICD-10-CM

## 2024-04-10 PROCEDURE — 99214 OFFICE O/P EST MOD 30 MIN: CPT | Performed by: INTERNAL MEDICINE

## 2024-04-10 PROCEDURE — 1123F ACP DISCUSS/DSCN MKR DOCD: CPT | Performed by: INTERNAL MEDICINE

## 2024-04-10 RX ORDER — ATORVASTATIN CALCIUM 40 MG/1
40 TABLET, FILM COATED ORAL DAILY
Qty: 90 TABLET | Refills: 0 | Status: SHIPPED | OUTPATIENT
Start: 2024-04-10

## 2024-04-10 RX ORDER — TRAMADOL HYDROCHLORIDE 50 MG/1
50 TABLET ORAL EVERY 6 HOURS PRN
Qty: 12 TABLET | Refills: 0 | Status: SHIPPED | OUTPATIENT
Start: 2024-04-10 | End: 2024-04-13

## 2024-04-10 RX ORDER — GABAPENTIN 100 MG/1
100 CAPSULE ORAL 2 TIMES DAILY
Qty: 180 CAPSULE | Refills: 0 | Status: SHIPPED | OUTPATIENT
Start: 2024-04-10 | End: 2024-07-09

## 2024-04-10 RX ORDER — METOPROLOL SUCCINATE 25 MG/1
12.5 TABLET, EXTENDED RELEASE ORAL DAILY
Qty: 45 TABLET | Refills: 1 | Status: SHIPPED | OUTPATIENT
Start: 2024-04-10

## 2024-04-10 SDOH — ECONOMIC STABILITY: INCOME INSECURITY: HOW HARD IS IT FOR YOU TO PAY FOR THE VERY BASICS LIKE FOOD, HOUSING, MEDICAL CARE, AND HEATING?: NOT HARD AT ALL

## 2024-04-10 SDOH — ECONOMIC STABILITY: FOOD INSECURITY: WITHIN THE PAST 12 MONTHS, THE FOOD YOU BOUGHT JUST DIDN'T LAST AND YOU DIDN'T HAVE MONEY TO GET MORE.: NEVER TRUE

## 2024-04-10 SDOH — ECONOMIC STABILITY: FOOD INSECURITY: WITHIN THE PAST 12 MONTHS, YOU WORRIED THAT YOUR FOOD WOULD RUN OUT BEFORE YOU GOT MONEY TO BUY MORE.: NEVER TRUE

## 2024-04-10 ASSESSMENT — PATIENT HEALTH QUESTIONNAIRE - PHQ9
1. LITTLE INTEREST OR PLEASURE IN DOING THINGS: NEARLY EVERY DAY
9. THOUGHTS THAT YOU WOULD BE BETTER OFF DEAD, OR OF HURTING YOURSELF: NOT AT ALL
7. TROUBLE CONCENTRATING ON THINGS, SUCH AS READING THE NEWSPAPER OR WATCHING TELEVISION: SEVERAL DAYS
SUM OF ALL RESPONSES TO PHQ QUESTIONS 1-9: 16
10. IF YOU CHECKED OFF ANY PROBLEMS, HOW DIFFICULT HAVE THESE PROBLEMS MADE IT FOR YOU TO DO YOUR WORK, TAKE CARE OF THINGS AT HOME, OR GET ALONG WITH OTHER PEOPLE: NOT DIFFICULT AT ALL
SUM OF ALL RESPONSES TO PHQ QUESTIONS 1-9: 16
8. MOVING OR SPEAKING SO SLOWLY THAT OTHER PEOPLE COULD HAVE NOTICED. OR THE OPPOSITE, BEING SO FIGETY OR RESTLESS THAT YOU HAVE BEEN MOVING AROUND A LOT MORE THAN USUAL: NOT AT ALL
6. FEELING BAD ABOUT YOURSELF - OR THAT YOU ARE A FAILURE OR HAVE LET YOURSELF OR YOUR FAMILY DOWN: NOT AT ALL
4. FEELING TIRED OR HAVING LITTLE ENERGY: NEARLY EVERY DAY
3. TROUBLE FALLING OR STAYING ASLEEP: NEARLY EVERY DAY
5. POOR APPETITE OR OVEREATING: NEARLY EVERY DAY
2. FEELING DOWN, DEPRESSED OR HOPELESS: NEARLY EVERY DAY
SUM OF ALL RESPONSES TO PHQ9 QUESTIONS 1 & 2: 6

## 2024-04-10 ASSESSMENT — ENCOUNTER SYMPTOMS
VOMITING: 0
TROUBLE SWALLOWING: 0
NAUSEA: 0
ABDOMINAL PAIN: 0
ALLERGIC/IMMUNOLOGIC NEGATIVE: 1
DIARRHEA: 0
SORE THROAT: 0
SINUS PRESSURE: 0
BACK PAIN: 0
ABDOMINAL DISTENTION: 0
RHINORRHEA: 0
BLOOD IN STOOL: 0
CONSTIPATION: 0

## 2024-04-10 NOTE — PROGRESS NOTES
Liane ANAHI Fernandezam presents today for follow up on high chol, ASHD, Valvular Heart Disease, low thyroid    Current Outpatient Medications   Medication Sig Dispense Refill    atorvastatin (LIPITOR) 40 MG tablet Take 1 tablet by mouth daily 90 tablet 0    diclofenac sodium (VOLTAREN) 1 % GEL Apply 2 g topically 2 times daily 350 g 1    gabapentin (NEURONTIN) 100 MG capsule Take 1 capsule by mouth 2 times daily for 90 days. 180 capsule 0    metoprolol succinate (TOPROL XL) 25 MG extended release tablet Take 0.5 tablets by mouth daily 45 tablet 1    traMADol (ULTRAM) 50 MG tablet Take 1 tablet by mouth every 6 hours as needed for Pain for up to 3 days. Intended supply: 3 days. Take lowest dose possible to manage pain Max Daily Amount: 200 mg 12 tablet 0    buPROPion (WELLBUTRIN XL) 150 MG extended release tablet Take 1 tablet by mouth once daily 90 tablet 0    levothyroxine (SYNTHROID) 125 MCG tablet Take 1 tablet by mouth Daily 90 tablet 0    traZODone (DESYREL) 50 MG tablet Take 0.5 tablets by mouth daily 45 tablet 0    omeprazole (PRILOSEC) 40 MG delayed release capsule Take 1 capsule by mouth daily 90 capsule 0    furosemide (LASIX) 40 MG tablet Take 1 tablet by mouth daily (Patient taking differently: Take 0.5 tablets by mouth daily) 90 tablet 0    spironolactone (ALDACTONE) 25 MG tablet Take 0.5 tablets by mouth daily 45 tablet 2    losartan (COZAAR) 25 MG tablet Take 0.5 tablets by mouth daily 45 tablet 0    EQ ASPIRIN ADULT LOW DOSE 81 MG EC tablet Take 1 tablet by mouth daily      docusate sodium (COLACE) 100 MG capsule Take 1 capsule by mouth 2 times daily      loratadine (CLARITIN) 10 MG tablet Take 1 tablet by mouth daily      acetaminophen (TYLENOL) 500 MG tablet Take 2 tablets by mouth nightly as needed for Pain      clopidogrel (PLAVIX) 75 MG tablet Take 1 tablet by mouth daily 90 tablet 0    Multiple Vitamin (MULTI-VITAMIN DAILY PO) Take by mouth daily      Multiple Vitamins-Minerals (PRESERVISION AREDS 2

## 2024-04-16 RX ORDER — FLUTICASONE PROPIONATE 50 MCG
1 SPRAY, SUSPENSION (ML) NASAL DAILY
Qty: 1 EACH | Refills: 2 | Status: SHIPPED | OUTPATIENT
Start: 2024-04-16

## 2024-04-24 ENCOUNTER — TELEPHONE (OUTPATIENT)
Dept: PHARMACY | Facility: CLINIC | Age: 89
End: 2024-04-24

## 2024-04-24 DIAGNOSIS — I10 HYPERTENSION, UNSPECIFIED TYPE: ICD-10-CM

## 2024-04-24 RX ORDER — LOSARTAN POTASSIUM 25 MG/1
TABLET ORAL
Qty: 45 TABLET | Refills: 0 | Status: SHIPPED | OUTPATIENT
Start: 2024-04-24

## 2024-04-24 NOTE — TELEPHONE ENCOUNTER
[facility] CLINICAL PHARMACY: ADHERENCE REVIEW  Identified care gap per Dyess: fills at [facility]: ACE/ARB adherence    Patient also appears to be prescribed: ACE/ARB    ASSESSMENT    ACE/ARB ADHERENCE    Insurance Records claims through 24 (Prior Year PDC = not reported; YTD PDC = FIRST FILL;  Losartan Pot Tab 25mg last filled on 24 for 90 day supply. Next refill due: 24    Prescribed si tablet/capsule daily    Per Reconcile Dispense History: last filled on 24 for 90 day supply.     Per [facility]:  0 refills remaining. Pharmacy will send a refill request    BP Readings from Last 3 Encounters:   24 118/62   24 136/62   24 118/62     Estimated Creatinine Clearance: 23 mL/min (A) (based on SCr of 1.3 mg/dL (H)).  Lab Results   Component Value Date    CREATININE 1.3 (H) 2024     Lab Results   Component Value Date    K 4.4 2024       PLAN  The following are interventions that have been identified:   Patient OVERDUE refilling LOSARTAN POT TAB 25MG and active on home medication list.     No patient outreach planned at this time.  I will f/u to make sure a new rx is sent and fill to [facility]     Recent Visits  Date Type Provider Dept   04/10/24 Office Visit Linda Santiago MD [facility]    [facility]   [facility]   23 Office Visit Linda Santiago MD [facility]    10/17/23 Office Visit Linda Santiago MD [facility]    23 Office Visit Linda Santiago MD [facility]    23 Office Visit Linda Santiago MD [facility]    23 Office Visit Linda Santiago MD [facility]   Showing recent visits within past 540 days with a meds authorizing provider and meeting all other requirements  Future Appointments  Date Type Provider Dept   24 Appointment Linda Santiago

## 2024-04-30 RX ORDER — TRAZODONE HYDROCHLORIDE 50 MG/1
25 TABLET ORAL DAILY
Qty: 45 TABLET | Refills: 0 | Status: SHIPPED | OUTPATIENT
Start: 2024-04-30

## 2024-04-30 RX ORDER — GABAPENTIN 100 MG/1
100 CAPSULE ORAL 2 TIMES DAILY
Qty: 180 CAPSULE | Refills: 0 | Status: SHIPPED | OUTPATIENT
Start: 2024-04-30 | End: 2024-07-29

## 2024-04-30 RX ORDER — OMEPRAZOLE 40 MG/1
40 CAPSULE, DELAYED RELEASE ORAL DAILY
Qty: 90 CAPSULE | Refills: 0 | Status: SHIPPED | OUTPATIENT
Start: 2024-04-30

## 2024-04-30 RX ORDER — LEVOTHYROXINE SODIUM 0.12 MG/1
125 TABLET ORAL DAILY
Qty: 90 TABLET | Refills: 0 | Status: SHIPPED | OUTPATIENT
Start: 2024-04-30

## 2024-04-30 NOTE — TELEPHONE ENCOUNTER
Patients daughter requesting a refill of her   levothyroxine (SYNTHROID) 125 MCG tablet     omeprazole (PRILOSEC) 40 MG delayed release capsule     traZODone (DESYREL) 50 MG tablet       gabapentin (NEURONTIN) 100 MG capsule      traMADol (ULTRAM) 50 MG tablet .    Please send to [facility] in [place]. Thank you.

## 2024-05-09 DIAGNOSIS — F41.9 ANXIETY: ICD-10-CM

## 2024-05-09 RX ORDER — BUPROPION HYDROCHLORIDE 150 MG/1
TABLET ORAL
Qty: 90 TABLET | Refills: 0 | Status: SHIPPED | OUTPATIENT
Start: 2024-05-09

## 2024-05-28 RX ORDER — CLOPIDOGREL BISULFATE 75 MG/1
75 TABLET ORAL DAILY
Qty: 90 TABLET | Refills: 0 | Status: SHIPPED | OUTPATIENT
Start: 2024-05-28

## 2024-06-07 ENCOUNTER — TELEPHONE (OUTPATIENT)
Dept: CARDIOLOGY CLINIC | Age: 89
End: 2024-06-07

## 2024-06-07 NOTE — TELEPHONE ENCOUNTER
[facility] requesting cardiac clearance for patient having an LMAC with sedation.       Needs to hold plavix x5 days prior to injections.

## 2024-06-26 ENCOUNTER — OFFICE VISIT (OUTPATIENT)
Dept: PRIMARY CARE CLINIC | Age: 89
End: 2024-06-26
Payer: MEDICARE

## 2024-06-26 VITALS
TEMPERATURE: 97.3 F | OXYGEN SATURATION: 95 % | SYSTOLIC BLOOD PRESSURE: 122 MMHG | BODY MASS INDEX: 26.22 KG/M2 | HEIGHT: 63 IN | WEIGHT: 148 LBS | DIASTOLIC BLOOD PRESSURE: 60 MMHG | HEART RATE: 75 BPM

## 2024-06-26 DIAGNOSIS — R53.83 OTHER FATIGUE: ICD-10-CM

## 2024-06-26 DIAGNOSIS — I25.10 CORONARY ARTERY DISEASE INVOLVING NATIVE CORONARY ARTERY OF NATIVE HEART WITHOUT ANGINA PECTORIS: ICD-10-CM

## 2024-06-26 DIAGNOSIS — I50.22 CHRONIC HFREF (HEART FAILURE WITH REDUCED EJECTION FRACTION) (HCC): ICD-10-CM

## 2024-06-26 DIAGNOSIS — Z00.00 MEDICARE ANNUAL WELLNESS VISIT, SUBSEQUENT: Primary | ICD-10-CM

## 2024-06-26 LAB
ALBUMIN: 4 G/DL (ref 3.5–5.2)
ALP BLD-CCNC: 67 U/L (ref 35–104)
ALT SERPL-CCNC: 21 U/L (ref 0–32)
ANION GAP SERPL CALCULATED.3IONS-SCNC: 16 MMOL/L (ref 7–16)
AST SERPL-CCNC: 25 U/L (ref 0–31)
BASOPHILS ABSOLUTE: 0.04 K/UL (ref 0–0.2)
BASOPHILS RELATIVE PERCENT: 1 % (ref 0–2)
BILIRUB SERPL-MCNC: 0.5 MG/DL (ref 0–1.2)
BUN BLDV-MCNC: 20 MG/DL (ref 6–23)
CALCIUM SERPL-MCNC: 9.2 MG/DL (ref 8.6–10.2)
CHLORIDE BLD-SCNC: 94 MMOL/L (ref 98–107)
CHOLESTEROL, TOTAL: 143 MG/DL
CO2: 23 MMOL/L (ref 22–29)
CREAT SERPL-MCNC: 1.3 MG/DL (ref 0.5–1)
EOSINOPHILS ABSOLUTE: 0.17 K/UL (ref 0.05–0.5)
EOSINOPHILS RELATIVE PERCENT: 2 % (ref 0–6)
GFR, ESTIMATED: 39 ML/MIN/1.73M2
GLUCOSE BLD-MCNC: 118 MG/DL (ref 74–99)
HCT VFR BLD CALC: 36.9 % (ref 34–48)
HDLC SERPL-MCNC: 61 MG/DL
HEMOGLOBIN: 12 G/DL (ref 11.5–15.5)
IMMATURE GRANULOCYTES %: 0 % (ref 0–5)
IMMATURE GRANULOCYTES ABSOLUTE: <0.03 K/UL (ref 0–0.58)
LDL CHOLESTEROL: 56 MG/DL
LYMPHOCYTES ABSOLUTE: 1.25 K/UL (ref 1.5–4)
LYMPHOCYTES RELATIVE PERCENT: 17 % (ref 20–42)
MCH RBC QN AUTO: 32.8 PG (ref 26–35)
MCHC RBC AUTO-ENTMCNC: 32.5 G/DL (ref 32–34.5)
MCV RBC AUTO: 100.8 FL (ref 80–99.9)
MONOCYTES ABSOLUTE: 0.58 K/UL (ref 0.1–0.95)
MONOCYTES RELATIVE PERCENT: 8 % (ref 2–12)
NEUTROPHILS ABSOLUTE: 5.18 K/UL (ref 1.8–7.3)
NEUTROPHILS RELATIVE PERCENT: 72 % (ref 43–80)
PDW BLD-RTO: 15.5 % (ref 11.5–15)
PLATELET # BLD: 252 K/UL (ref 130–450)
PMV BLD AUTO: 11.2 FL (ref 7–12)
POTASSIUM SERPL-SCNC: 4.4 MMOL/L (ref 3.5–5)
RBC # BLD: 3.66 M/UL (ref 3.5–5.5)
SODIUM BLD-SCNC: 133 MMOL/L (ref 132–146)
TOTAL PROTEIN: 7 G/DL (ref 6.4–8.3)
TRIGL SERPL-MCNC: 128 MG/DL
TSH SERPL DL<=0.05 MIU/L-ACNC: 3.51 UIU/ML (ref 0.27–4.2)
VLDLC SERPL CALC-MCNC: 26 MG/DL
WBC # BLD: 7.2 K/UL (ref 4.5–11.5)

## 2024-06-26 PROCEDURE — 36415 COLL VENOUS BLD VENIPUNCTURE: CPT | Performed by: INTERNAL MEDICINE

## 2024-06-26 PROCEDURE — G0439 PPPS, SUBSEQ VISIT: HCPCS | Performed by: INTERNAL MEDICINE

## 2024-06-26 PROCEDURE — 1123F ACP DISCUSS/DSCN MKR DOCD: CPT | Performed by: INTERNAL MEDICINE

## 2024-06-26 RX ORDER — GABAPENTIN 300 MG/1
CAPSULE ORAL
COMMUNITY
Start: 2024-06-25

## 2024-06-26 RX ORDER — SPIRONOLACTONE 25 MG/1
12.5 TABLET ORAL DAILY
Qty: 45 TABLET | Refills: 1 | Status: SHIPPED | OUTPATIENT
Start: 2024-06-26

## 2024-06-26 RX ORDER — METOPROLOL SUCCINATE 25 MG/1
12.5 TABLET, EXTENDED RELEASE ORAL DAILY
Qty: 45 TABLET | Refills: 1 | Status: SHIPPED | OUTPATIENT
Start: 2024-06-26

## 2024-06-26 ASSESSMENT — PATIENT HEALTH QUESTIONNAIRE - PHQ9
SUM OF ALL RESPONSES TO PHQ QUESTIONS 1-9: 6
SUM OF ALL RESPONSES TO PHQ9 QUESTIONS 1 & 2: 0
9. THOUGHTS THAT YOU WOULD BE BETTER OFF DEAD, OR OF HURTING YOURSELF: NOT AT ALL
6. FEELING BAD ABOUT YOURSELF - OR THAT YOU ARE A FAILURE OR HAVE LET YOURSELF OR YOUR FAMILY DOWN: NOT AT ALL
2. FEELING DOWN, DEPRESSED OR HOPELESS: NOT AT ALL
10. IF YOU CHECKED OFF ANY PROBLEMS, HOW DIFFICULT HAVE THESE PROBLEMS MADE IT FOR YOU TO DO YOUR WORK, TAKE CARE OF THINGS AT HOME, OR GET ALONG WITH OTHER PEOPLE: NOT DIFFICULT AT ALL
SUM OF ALL RESPONSES TO PHQ QUESTIONS 1-9: 6
5. POOR APPETITE OR OVEREATING: NOT AT ALL
1. LITTLE INTEREST OR PLEASURE IN DOING THINGS: NOT AT ALL
7. TROUBLE CONCENTRATING ON THINGS, SUCH AS READING THE NEWSPAPER OR WATCHING TELEVISION: NOT AT ALL
SUM OF ALL RESPONSES TO PHQ QUESTIONS 1-9: 6
3. TROUBLE FALLING OR STAYING ASLEEP: NEARLY EVERY DAY
SUM OF ALL RESPONSES TO PHQ QUESTIONS 1-9: 6
8. MOVING OR SPEAKING SO SLOWLY THAT OTHER PEOPLE COULD HAVE NOTICED. OR THE OPPOSITE, BEING SO FIGETY OR RESTLESS THAT YOU HAVE BEEN MOVING AROUND A LOT MORE THAN USUAL: NOT AT ALL
4. FEELING TIRED OR HAVING LITTLE ENERGY: NEARLY EVERY DAY

## 2024-06-26 ASSESSMENT — LIFESTYLE VARIABLES
HAS A RELATIVE, FRIEND, DOCTOR, OR ANOTHER HEALTH PROFESSIONAL EXPRESSED CONCERN ABOUT YOUR DRINKING OR SUGGESTED YOU CUT DOWN: NO
HAVE YOU OR SOMEONE ELSE BEEN INJURED AS A RESULT OF YOUR DRINKING: NO
HOW OFTEN DURING THE LAST YEAR HAVE YOU FAILED TO DO WHAT WAS NORMALLY EXPECTED FROM YOU BECAUSE OF DRINKING: NEVER
HOW OFTEN DURING THE LAST YEAR HAVE YOU BEEN UNABLE TO REMEMBER WHAT HAPPENED THE NIGHT BEFORE BECAUSE YOU HAD BEEN DRINKING: NEVER
HOW OFTEN DO YOU HAVE A DRINK CONTAINING ALCOHOL: 4 OR MORE TIMES A WEEK
HOW MANY STANDARD DRINKS CONTAINING ALCOHOL DO YOU HAVE ON A TYPICAL DAY: 1 OR 2
HOW OFTEN DURING THE LAST YEAR HAVE YOU FOUND THAT YOU WERE NOT ABLE TO STOP DRINKING ONCE YOU HAD STARTED: NEVER
HOW OFTEN DURING THE LAST YEAR HAVE YOU NEEDED AN ALCOHOLIC DRINK FIRST THING IN THE MORNING TO GET YOURSELF GOING AFTER A NIGHT OF HEAVY DRINKING: NEVER

## 2024-06-26 NOTE — PATIENT INSTRUCTIONS
including forms for your state, see the [employer] website (www.caringinfo.org/planning/advance-directives/).  Follow-up care is a key part of your treatment and safety. Be sure to make and go to all appointments, and call your doctor if you are having problems. It's also a good idea to know your test results and keep a list of the medicines you take.  What should you include in an advance directive?  Many states have a unique advance directive form. (It may ask you to address specific issues.) Or you might use a universal form that's approved by many states.  If your form doesn't tell you what to address, it may be hard to know what to include in your advance directive. Use the questions below to help you get started.  Who do you want to make decisions about your medical care if you are not able to?  What life-support measures do you want if you have a serious illness that gets worse over time or can't be cured?  What are you most afraid of that might happen? (Maybe you're afraid of having pain, losing your independence, or being kept alive by machines.)  Where would you prefer to die? (Your home? A hospital? A nursing home?)  Do you want to donate your organs when you die?  Do you want certain Voodoo practices performed before you die?  When should you call for help?  Be sure to contact your doctor if you have any questions.  Where can you learn more?  Go to https://www.Num[employer].net/patientEd and enter R264 to learn more about \"Advance Directives: Care Instructions.\"  Current as of: November 16, 2023  Content Version: 14.1  © 5471-6559 [employer].   Care instructions adapted under license by [employer]. If you have questions about a medical condition or this instruction, always ask your healthcare professional. [employer] disclaims any warranty or liability for your use of this information.           A Healthy Heart: Care Instructions  Overview     Coronary artery disease, also

## 2024-06-26 NOTE — PROGRESS NOTES
Medicare Annual Wellness Visit    Liane Booker is here for Medicare AWV    Assessment & Plan   Medicare annual wellness visit, subsequent  Coronary artery disease involving native coronary artery of native heart without angina pectoris  Comments:  Stable on meds, no angina, continue  The following orders have not been finalized:  Orders:  -     metoprolol succinate (TOPROL XL) 25 MG extended release tablet  Chronic HFrEF (heart failure with reduced ejection fraction) ([facility])  Comments:  Stable, no swelling, closely monitoring wt at home.  The following orders have not been finalized:  Orders:  -     spironolactone (ALDACTONE) 25 MG tablet  -     metoprolol succinate (TOPROL XL) 25 MG extended release tablet  Chronic HFrEF (heart failure with reduced ejection fraction) ([facility])  The following orders have not been finalized:  -     spironolactone (ALDACTONE) 25 MG tablet  -     metoprolol succinate (TOPROL XL) 25 MG extended release tablet    Recommendations for Preventive Services Due: see orders and patient instructions/AVS.  Recommended screening schedule for the next 5-10 years is provided to the patient in written form: see Patient Instructions/AVS.     Return for Medicare Annual Wellness Visit in 1 year.     Subjective       Patient's complete Health Risk Assessment and screening values have been reviewed and are found in Flowsheets. The following problems were reviewed today and where indicated follow up appointments were made and/or referrals ordered.    Positive Risk Factor Screenings with Interventions:    Fall Risk:  Do you feel unsteady or are you worried about falling? : (!) yes  2 or more falls in past year?: no  Fall with injury in past year?: no     Interventions:    Patient comments: uses walker, is very careful  Reviewed medications, home hazards, visual acuity, and co-morbidities that can increase risk for falls     Depression:  PHQ-2 Score: 0  PHQ-9 Total Score: 6    Interpretation:  5-9 mild

## 2024-07-17 ENCOUNTER — OFFICE VISIT (OUTPATIENT)
Dept: CARDIOLOGY CLINIC | Age: 89
End: 2024-07-17
Payer: MEDICARE

## 2024-07-17 VITALS
SYSTOLIC BLOOD PRESSURE: 122 MMHG | WEIGHT: 152.8 LBS | RESPIRATION RATE: 17 BRPM | DIASTOLIC BLOOD PRESSURE: 66 MMHG | HEIGHT: 63 IN | BODY MASS INDEX: 27.07 KG/M2 | HEART RATE: 80 BPM

## 2024-07-17 DIAGNOSIS — I35.0 NONRHEUMATIC AORTIC VALVE STENOSIS: Primary | ICD-10-CM

## 2024-07-17 DIAGNOSIS — I44.7 LBBB (LEFT BUNDLE BRANCH BLOCK): ICD-10-CM

## 2024-07-17 DIAGNOSIS — I50.22 CHRONIC HFREF (HEART FAILURE WITH REDUCED EJECTION FRACTION) (HCC): ICD-10-CM

## 2024-07-17 DIAGNOSIS — I25.10 CORONARY ARTERY DISEASE INVOLVING NATIVE CORONARY ARTERY OF NATIVE HEART WITHOUT ANGINA PECTORIS: ICD-10-CM

## 2024-07-17 PROCEDURE — 99214 OFFICE O/P EST MOD 30 MIN: CPT | Performed by: INTERNAL MEDICINE

## 2024-07-17 PROCEDURE — 1123F ACP DISCUSS/DSCN MKR DOCD: CPT | Performed by: INTERNAL MEDICINE

## 2024-07-17 PROCEDURE — 93000 ELECTROCARDIOGRAM COMPLETE: CPT | Performed by: INTERNAL MEDICINE

## 2024-07-17 RX ORDER — CLOPIDOGREL BISULFATE 75 MG/1
75 TABLET ORAL DAILY
Qty: 90 TABLET | Refills: 2 | Status: SHIPPED | OUTPATIENT
Start: 2024-07-17

## 2024-07-17 RX ORDER — FUROSEMIDE 20 MG/1
20 TABLET ORAL DAILY
Qty: 90 TABLET | Refills: 2 | Status: SHIPPED | OUTPATIENT
Start: 2024-07-17

## 2024-07-17 NOTE — PROGRESS NOTES
mouth in the morning and at bedtime, Disp: , Rfl:     Biotin 1000 MCG TABS, Take 1 tablet by mouth daily, Disp: , Rfl:     Calcium-Vitamin D 500-125 MG-UNIT TABS, Take 1 tablet by mouth daily , Disp: , Rfl:     furosemide (LASIX) 20 MG tablet, Take 1 tablet by mouth daily, Disp: 90 tablet, Rfl: 2    clopidogrel (PLAVIX) 75 MG tablet, Take 1 tablet by mouth daily, Disp: 90 tablet, Rfl: 2    mirtazapine (REMERON) 30 MG tablet, Take 1 tablet by mouth nightly (Patient not taking: Reported on 7/17/2024), Disp: 90 tablet, Rfl: 0      S: REASON FOR VISIT:       Chief Complaint   Patient presents with    Hypertension    STEMI    ASHD    LBBB     6 mo office visit.. pt has some questions          History of Present Illness:       Office Visit for follow up of  CAD, VHD, LBBB, HTN              89 year old with HX of CAD s/p PCI 9/2023, AS per Echo April 2023, LBBB    Referred to [facility] for TAVR evaluation - had Echo 7/24/23, CTA chest 7/24/23, LHC 7/25/23; Elective PCI to LM/LAD with complication 8/30/23; PCI of mid LAD 9/5/2023 - Dr Doe    C/o neck, shoulder and arm pain  - takes Tylenol   No hospitalizations or surgeries since last visit   Compliant with all medications   Kyle any exertional chest pain or short of breath   No palpitations, dizzy or syncope.   Fairly active at home   Try to watch diet          Past Medical History:   Diagnosis Date    Anxiety     Depression     DJD (degenerative joint disease)     Dysphagia 02/20/2014    GERD (gastroesophageal reflux disease) 02/20/2014    Hyperlipidemia     Hypertension     Hypothyroidism     Irritable bowel syndrome with constipation     Low vitamin D level     Osteoarthritis     Valvular heart disease             Past Surgical History:   Procedure Laterality Date    APPENDECTOMY      CARPAL TUNNEL RELEASE Right 10/05/2016    RIGHT INDEX FINGER TRIGGER RELEASE RIGHT THUMB CARPAL METACARPAL ARTHROPLASTY WITH LIGAMENT RECONSTRUCTION    CATARACT EXTRACTION, BILATERAL

## 2024-07-29 DIAGNOSIS — I10 HYPERTENSION, UNSPECIFIED TYPE: ICD-10-CM

## 2024-07-30 DIAGNOSIS — F41.9 ANXIETY: ICD-10-CM

## 2024-07-30 RX ORDER — LOSARTAN POTASSIUM 25 MG/1
TABLET ORAL
Qty: 45 TABLET | Refills: 0 | Status: SHIPPED | OUTPATIENT
Start: 2024-07-30

## 2024-07-30 RX ORDER — LEVOTHYROXINE SODIUM 0.12 MG/1
125 TABLET ORAL DAILY
Qty: 90 TABLET | Refills: 0 | Status: SHIPPED | OUTPATIENT
Start: 2024-07-30

## 2024-07-30 RX ORDER — CLOPIDOGREL BISULFATE 75 MG/1
75 TABLET ORAL DAILY
Qty: 90 TABLET | Refills: 0 | Status: SHIPPED | OUTPATIENT
Start: 2024-07-30

## 2024-07-30 RX ORDER — BUPROPION HYDROCHLORIDE 150 MG/1
150 TABLET ORAL DAILY
Qty: 90 TABLET | Refills: 0 | Status: SHIPPED | OUTPATIENT
Start: 2024-07-30

## 2024-07-30 RX ORDER — OMEPRAZOLE 40 MG/1
40 CAPSULE, DELAYED RELEASE ORAL DAILY
Qty: 90 CAPSULE | Refills: 0 | Status: SHIPPED | OUTPATIENT
Start: 2024-07-30

## 2024-07-30 NOTE — TELEPHONE ENCOUNTER
Patients daughter requesting refill of of following medications:    buPROPion (WELLBUTRIN XL) 150 MG extended release tablet     levothyroxine (SYNTHROID) 125 MCG tablet     clopidogrel (PLAVIX) 75 MG tablet     omeprazole (PRILOSEC) 40 MG delayed release capsule     Please sent to   [facility] [address] [place] [address]    Thank you.

## 2024-08-07 ENCOUNTER — NURSE ONLY (OUTPATIENT)
Dept: PRIMARY CARE CLINIC | Age: 89
End: 2024-08-07
Payer: MEDICARE

## 2024-08-07 DIAGNOSIS — R30.0 DYSURIA: Primary | ICD-10-CM

## 2024-08-07 DIAGNOSIS — R82.998 LEUKOCYTES IN URINE: ICD-10-CM

## 2024-08-07 LAB
BILIRUBIN, POC: ABNORMAL
BLOOD URINE, POC: ABNORMAL
CLARITY, POC: CLEAR
COLOR, POC: YELLOW
GLUCOSE URINE, POC: ABNORMAL
KETONES, POC: ABNORMAL
LEUKOCYTE EST, POC: ABNORMAL
NITRITE, POC: POSITIVE
PH, POC: 7
PROTEIN, POC: ABNORMAL
SPECIFIC GRAVITY, POC: 1.02
UROBILINOGEN, POC: ABNORMAL

## 2024-08-07 PROCEDURE — 81002 URINALYSIS NONAUTO W/O SCOPE: CPT | Performed by: INTERNAL MEDICINE

## 2024-08-07 PROCEDURE — 99999 PR OFFICE/OUTPT VISIT,PROCEDURE ONLY: CPT | Performed by: INTERNAL MEDICINE

## 2024-08-07 RX ORDER — CIPROFLOXACIN 500 MG/1
500 TABLET, FILM COATED ORAL 2 TIMES DAILY
Qty: 20 TABLET | Refills: 0 | Status: SHIPPED | OUTPATIENT
Start: 2024-08-07 | End: 2024-08-17

## 2024-08-10 LAB
CULTURE: ABNORMAL
CULTURE: ABNORMAL
SPECIMEN DESCRIPTION: ABNORMAL

## 2024-08-13 DIAGNOSIS — F41.9 ANXIETY: ICD-10-CM

## 2024-08-13 RX ORDER — BUPROPION HYDROCHLORIDE 150 MG/1
150 TABLET ORAL DAILY
Qty: 90 TABLET | Refills: 0 | OUTPATIENT
Start: 2024-08-13

## 2024-09-18 ENCOUNTER — OFFICE VISIT (OUTPATIENT)
Dept: PRIMARY CARE CLINIC | Age: 89
End: 2024-09-18
Payer: MEDICARE

## 2024-09-18 VITALS
HEART RATE: 76 BPM | HEIGHT: 63 IN | BODY MASS INDEX: 25.52 KG/M2 | TEMPERATURE: 97.6 F | SYSTOLIC BLOOD PRESSURE: 118 MMHG | OXYGEN SATURATION: 94 % | DIASTOLIC BLOOD PRESSURE: 58 MMHG | WEIGHT: 144 LBS

## 2024-09-18 DIAGNOSIS — Z23 NEEDS FLU SHOT: Primary | ICD-10-CM

## 2024-09-18 DIAGNOSIS — E03.9 ACQUIRED HYPOTHYROIDISM: ICD-10-CM

## 2024-09-18 DIAGNOSIS — I10 HYPERTENSION, UNSPECIFIED TYPE: ICD-10-CM

## 2024-09-18 DIAGNOSIS — I35.0 NONRHEUMATIC AORTIC VALVE STENOSIS: ICD-10-CM

## 2024-09-18 DIAGNOSIS — M54.12 CERVICAL RADICULOPATHY: ICD-10-CM

## 2024-09-18 DIAGNOSIS — I25.10 ASHD (ARTERIOSCLEROTIC HEART DISEASE): ICD-10-CM

## 2024-09-18 PROCEDURE — 1123F ACP DISCUSS/DSCN MKR DOCD: CPT | Performed by: INTERNAL MEDICINE

## 2024-09-18 PROCEDURE — 90653 IIV ADJUVANT VACCINE IM: CPT | Performed by: INTERNAL MEDICINE

## 2024-09-18 PROCEDURE — 99214 OFFICE O/P EST MOD 30 MIN: CPT | Performed by: INTERNAL MEDICINE

## 2024-09-18 PROCEDURE — G0008 ADMIN INFLUENZA VIRUS VAC: HCPCS | Performed by: INTERNAL MEDICINE

## 2024-09-18 RX ORDER — GABAPENTIN 300 MG/1
300 CAPSULE ORAL 2 TIMES DAILY
Qty: 180 CAPSULE | Refills: 0 | Status: SHIPPED | OUTPATIENT
Start: 2024-09-18 | End: 2024-12-17

## 2024-09-18 RX ORDER — TRAZODONE HYDROCHLORIDE 50 MG/1
25 TABLET, FILM COATED ORAL DAILY
Qty: 45 TABLET | Refills: 0 | Status: SHIPPED | OUTPATIENT
Start: 2024-09-18

## 2024-09-18 ASSESSMENT — ENCOUNTER SYMPTOMS
VOMITING: 0
NAUSEA: 0
TROUBLE SWALLOWING: 0
SINUS PRESSURE: 0
BACK PAIN: 0
RHINORRHEA: 0
BLOOD IN STOOL: 0
DIARRHEA: 0
CONSTIPATION: 0
SORE THROAT: 0
COLOR CHANGE: 0
ABDOMINAL PAIN: 0
ALLERGIC/IMMUNOLOGIC NEGATIVE: 1
ABDOMINAL DISTENTION: 0

## 2024-09-19 ENCOUNTER — TELEPHONE (OUTPATIENT)
Dept: CARDIOLOGY CLINIC | Age: 89
End: 2024-09-19

## 2024-10-11 ENCOUNTER — APPOINTMENT (OUTPATIENT)
Dept: CT IMAGING | Age: 89
End: 2024-10-11
Payer: MEDICARE

## 2024-10-11 ENCOUNTER — APPOINTMENT (OUTPATIENT)
Dept: GENERAL RADIOLOGY | Age: 89
End: 2024-10-11
Payer: MEDICARE

## 2024-10-11 ENCOUNTER — HOSPITAL ENCOUNTER (EMERGENCY)
Age: 89
Discharge: HOME | End: 2024-10-12
Attending: EMERGENCY MEDICINE
Payer: MEDICARE

## 2024-10-11 VITALS
BODY MASS INDEX: 24.8 KG/M2 | SYSTOLIC BLOOD PRESSURE: 103 MMHG | OXYGEN SATURATION: 93 % | DIASTOLIC BLOOD PRESSURE: 57 MMHG | HEART RATE: 75 BPM | TEMPERATURE: 97.9 F | HEIGHT: 63 IN | WEIGHT: 140 LBS | RESPIRATION RATE: 11 BRPM

## 2024-10-11 DIAGNOSIS — S09.90XA CLOSED HEAD INJURY, INITIAL ENCOUNTER: ICD-10-CM

## 2024-10-11 DIAGNOSIS — R55 SYNCOPE, UNSPECIFIED SYNCOPE TYPE: Primary | ICD-10-CM

## 2024-10-11 LAB
ANION GAP SERPL CALCULATED.3IONS-SCNC: 10 MMOL/L (ref 7–16)
BASOPHILS # BLD: 0.04 K/UL (ref 0–0.2)
BASOPHILS NFR BLD: 1 % (ref 0–2)
BUN SERPL-MCNC: 20 MG/DL (ref 6–23)
CALCIUM SERPL-MCNC: 8.7 MG/DL (ref 8.6–10.2)
CHLORIDE SERPL-SCNC: 94 MMOL/L (ref 98–107)
CO2 SERPL-SCNC: 26 MMOL/L (ref 22–29)
CREAT SERPL-MCNC: 1.2 MG/DL (ref 0.5–1)
EKG ATRIAL RATE: 72 BPM
EKG P AXIS: 61 DEGREES
EKG P-R INTERVAL: 158 MS
EKG Q-T INTERVAL: 454 MS
EKG QRS DURATION: 136 MS
EKG QTC CALCULATION (BAZETT): 497 MS
EKG R AXIS: 79 DEGREES
EKG T AXIS: 140 DEGREES
EKG VENTRICULAR RATE: 72 BPM
EOSINOPHIL # BLD: 0.21 K/UL (ref 0.05–0.5)
EOSINOPHILS RELATIVE PERCENT: 4 % (ref 0–6)
ERYTHROCYTE [DISTWIDTH] IN BLOOD BY AUTOMATED COUNT: 13.6 % (ref 11.5–15)
GFR, ESTIMATED: 41 ML/MIN/1.73M2
GLUCOSE SERPL-MCNC: 99 MG/DL (ref 74–99)
HCT VFR BLD AUTO: 33.8 % (ref 34–48)
HGB BLD-MCNC: 10.9 G/DL (ref 11.5–15.5)
IMM GRANULOCYTES # BLD AUTO: <0.03 K/UL (ref 0–0.58)
IMM GRANULOCYTES NFR BLD: 0 % (ref 0–5)
LYMPHOCYTES NFR BLD: 1.53 K/UL (ref 1.5–4)
LYMPHOCYTES RELATIVE PERCENT: 26 % (ref 20–42)
MCH RBC QN AUTO: 32.9 PG (ref 26–35)
MCHC RBC AUTO-ENTMCNC: 32.2 G/DL (ref 32–34.5)
MCV RBC AUTO: 102.1 FL (ref 80–99.9)
MONOCYTES NFR BLD: 0.83 K/UL (ref 0.1–0.95)
MONOCYTES NFR BLD: 14 % (ref 2–12)
NEUTROPHILS NFR BLD: 55 % (ref 43–80)
NEUTS SEG NFR BLD: 3.2 K/UL (ref 1.8–7.3)
PLATELET # BLD AUTO: 224 K/UL (ref 130–450)
PMV BLD AUTO: 10 FL (ref 7–12)
POTASSIUM SERPL-SCNC: 4 MMOL/L (ref 3.5–5)
RBC # BLD AUTO: 3.31 M/UL (ref 3.5–5.5)
SODIUM SERPL-SCNC: 130 MMOL/L (ref 132–146)
TROPONIN I SERPL HS-MCNC: 35 NG/L (ref 0–9)
WBC OTHER # BLD: 5.8 K/UL (ref 4.5–11.5)

## 2024-10-11 PROCEDURE — 93005 ELECTROCARDIOGRAM TRACING: CPT | Performed by: EMERGENCY MEDICINE

## 2024-10-11 PROCEDURE — 84484 ASSAY OF TROPONIN QUANT: CPT

## 2024-10-11 PROCEDURE — 80048 BASIC METABOLIC PNL TOTAL CA: CPT

## 2024-10-11 PROCEDURE — 70450 CT HEAD/BRAIN W/O DYE: CPT

## 2024-10-11 PROCEDURE — 71045 X-RAY EXAM CHEST 1 VIEW: CPT

## 2024-10-11 PROCEDURE — 72125 CT NECK SPINE W/O DYE: CPT

## 2024-10-11 PROCEDURE — 85025 COMPLETE CBC W/AUTO DIFF WBC: CPT

## 2024-10-11 PROCEDURE — 99285 EMERGENCY DEPT VISIT HI MDM: CPT

## 2024-10-11 PROCEDURE — 72170 X-RAY EXAM OF PELVIS: CPT

## 2024-10-11 ASSESSMENT — PAIN - FUNCTIONAL ASSESSMENT: PAIN_FUNCTIONAL_ASSESSMENT: NONE - DENIES PAIN

## 2024-10-12 LAB — TROPONIN I SERPL HS-MCNC: 31 NG/L (ref 0–9)

## 2024-10-12 PROCEDURE — 84484 ASSAY OF TROPONIN QUANT: CPT

## 2024-10-12 NOTE — ED PROVIDER NOTES
[facility] EMERGENCY DEPARTMENT  EMERGENCY DEPARTMENT ENCOUNTER        Pt Name: Liane Booker  MRN: [id number]  Birthdate 4/15/1926  Date of evaluation: 10/11/2024  Provider: Harmeet Frias DO  PCP: Linda Santiago MD  Note Started: 9:05 PM EDT 10/11/24    CHIEF COMPLAINT       Chief Complaint   Patient presents with    Fall     Hit head off of floor, + Plavix    Loss of Consciousness     Syncopal episode       HISTORY OF PRESENT ILLNESS: 1 or more Elements   History From: Patient    Limitations to history : None    Liane Booker is a 89 y.o. female who presents to the emergency department for a fall..  The patient was out with family for dinner and had a few alcoholic beverages.  The patient had a witnessed syncopal episode falling hitting her head off the floor.  Upon arrival to the ED patient has no acute complaints.  No chest pain or shortness of breath.  No numbness or tingling.  No focal deficits.  No nausea vomiting or diarrhea    Nursing Notes were all reviewed and agreed with or any disagreements were addressed in the HPI.      REVIEW OF EXTERNAL NOTE :       PDMP Monitoring:    Last PDMP Ricky as Reviewed:  Review User Review Instant Review Result   RAND KIMBROUGH 12/15/2020  9:37 AM Reviewed PDMP [1]       Urine Drug Screenings (1 yr)    No resulted procedures found.       Medication Contract and Consent for Opioid Use Documents Filed        No documents found                      REVIEW OF SYSTEMS :           Positives and Pertinent negatives as per HPI.     SURGICAL HISTORY     Past Surgical History:   Procedure Laterality Date    APPENDECTOMY      CARPAL TUNNEL RELEASE Right 10/05/2016    RIGHT INDEX FINGER TRIGGER RELEASE RIGHT THUMB CARPAL METACARPAL ARTHROPLASTY WITH LIGAMENT RECONSTRUCTION    CATARACT EXTRACTION, BILATERAL      CHOLECYSTECTOMY, LAPAROSCOPIC      ENDOSCOPY, COLON, DIAGNOSTIC  03/03/2014    biopsy    HYSTERECTOMY (CERVIX  Contrast   Final Result   No acute intracranial findings.         CT CSpine W/O Contrast   Final Result   1.  No acute abnormality of the cervical spine.      2.  Nonspecific left sternoclavicular joint fluid may relate to trauma,   degenerative change, or infection.  No pronounced adjacent inflammation.      3.  Fluid in a patulous mediastinal esophagus, incompletely imaged on this   exam, suggestive of reflux or achalasia.           No results found.    No results found.    PROCEDURES   Unless otherwise noted below, none          CRITICAL CARE TIME (.cct)       PAST MEDICAL HISTORY/Chronic Conditions Affecting Care      has a past medical history of Anxiety, Depression, DJD (degenerative joint disease), Dysphagia (02/20/2014), GERD (gastroesophageal reflux disease) (02/20/2014), Hyperlipidemia, Hypertension, Hypothyroidism, Irritable bowel syndrome with constipation, Low vitamin D level, Osteoarthritis, and Valvular heart disease.     EMERGENCY DEPARTMENT COURSE    Vitals:    Vitals:    10/11/24 2058 10/11/24 2152 10/11/24 2256   BP: (!) 105/55  (!) 103/57   Pulse: 72  75   Resp: 18 18 11   Temp: 97.9 °F (36.6 °C)     TempSrc: Oral     SpO2: 100% 99% 93%   Weight: 63.5 kg (140 lb)     Height: 1.6 m (5' 3\")         Patient was given the following medications:  Medications - No data to display        Medical Decision Making/Differential Diagnosis:    CC/HPI Summary, Social Determinants of health, Records Reviewed, DDx, testing done/not done, ED Course, Reassessment, disposition considerations/shared decision making with patient, consults, disposition:      ED Course as of 10/12/24 0159   Fri Oct 11, 2024   2159 EKG  EKG interpreted by emergency physician  EKG shows normal sinus rhythm 72 bpm.  Normal axis.  T wave inversions noted laterally.  No STEMI..  Unchanged from previous [CB]      ED Course User Index  [CB] Harmeet Frias, DO        Medical Decision Making  Amount and/or Complexity of Data

## 2024-10-14 LAB
EKG ATRIAL RATE: 72 BPM
EKG P AXIS: 61 DEGREES
EKG P-R INTERVAL: 158 MS
EKG Q-T INTERVAL: 454 MS
EKG QRS DURATION: 136 MS
EKG QTC CALCULATION (BAZETT): 497 MS
EKG R AXIS: 79 DEGREES
EKG T AXIS: 140 DEGREES
EKG VENTRICULAR RATE: 72 BPM

## 2024-10-14 PROCEDURE — 93010 ELECTROCARDIOGRAM REPORT: CPT | Performed by: INTERNAL MEDICINE

## 2024-10-16 DIAGNOSIS — F41.9 ANXIETY: ICD-10-CM

## 2024-10-16 RX ORDER — LEVOTHYROXINE SODIUM 125 UG/1
125 TABLET ORAL DAILY
Qty: 90 TABLET | Refills: 0 | Status: SHIPPED | OUTPATIENT
Start: 2024-10-16

## 2024-10-16 RX ORDER — BUPROPION HYDROCHLORIDE 150 MG/1
150 TABLET ORAL DAILY
Qty: 90 TABLET | Refills: 0 | Status: SHIPPED | OUTPATIENT
Start: 2024-10-16

## 2024-10-16 RX ORDER — OMEPRAZOLE 40 MG/1
40 CAPSULE, DELAYED RELEASE ORAL DAILY
Qty: 90 CAPSULE | Refills: 0 | Status: SHIPPED | OUTPATIENT
Start: 2024-10-16

## 2024-10-16 RX ORDER — ATORVASTATIN CALCIUM 40 MG/1
40 TABLET, FILM COATED ORAL DAILY
Qty: 90 TABLET | Refills: 0 | Status: SHIPPED | OUTPATIENT
Start: 2024-10-16

## 2024-10-16 RX ORDER — CLOPIDOGREL BISULFATE 75 MG/1
75 TABLET ORAL DAILY
Qty: 90 TABLET | Refills: 0 | Status: SHIPPED | OUTPATIENT
Start: 2024-10-16

## 2024-10-16 NOTE — TELEPHONE ENCOUNTER
Patient daughter called in requesting refill on    buPROPion (WELLBUTRIN XL) 150 MG extended release tablet   omeprazole (PRILOSEC) 40 MG delayed release capsule   levothyroxine (SYNTHROID) 125 MCG tablet   atorvastatin (LIPITOR) 40 MG tablet   clopidogrel (PLAVIX) 75 MG tablet     [facility] in [place]    Next appointment 12/4/2024

## 2024-10-21 ENCOUNTER — TELEPHONE (OUTPATIENT)
Dept: PRIMARY CARE CLINIC | Age: 89
End: 2024-10-21

## 2024-10-21 NOTE — TELEPHONE ENCOUNTER
Talked to daughter and she believes they got it from ER visit on Friday. Both tested positive on Sunday. She said she is not doing well and has body aches and a headache. Has a cough and just like she has a terrible flu.    Was wondering if  would send the Paxlovid for her to [facility] in [place]?

## 2024-10-21 NOTE — TELEPHONE ENCOUNTER
Patients daughter called in to let Dr. Santiago know that Liane tested positive for Covid. Thank you.

## 2024-10-22 ENCOUNTER — TELEPHONE (OUTPATIENT)
Dept: PRIMARY CARE CLINIC | Age: 89
End: 2024-10-22

## 2024-10-25 NOTE — TELEPHONE ENCOUNTER
Notified patient's daughter Corina that Dr Santiago will discuss the heart monitor at her follow up from the hospital apt on 11/05/2024.

## 2024-11-05 ENCOUNTER — OFFICE VISIT (OUTPATIENT)
Dept: PRIMARY CARE CLINIC | Age: 89
End: 2024-11-05

## 2024-11-05 VITALS
BODY MASS INDEX: 25.87 KG/M2 | SYSTOLIC BLOOD PRESSURE: 124 MMHG | DIASTOLIC BLOOD PRESSURE: 60 MMHG | HEART RATE: 89 BPM | WEIGHT: 146 LBS | HEIGHT: 63 IN | TEMPERATURE: 98.3 F | OXYGEN SATURATION: 96 %

## 2024-11-05 DIAGNOSIS — R55 SYNCOPE, UNSPECIFIED SYNCOPE TYPE: ICD-10-CM

## 2024-11-05 DIAGNOSIS — I35.0 NONRHEUMATIC AORTIC VALVE STENOSIS: ICD-10-CM

## 2024-11-05 DIAGNOSIS — N18.32 STAGE 3B CHRONIC KIDNEY DISEASE (HCC): ICD-10-CM

## 2024-11-05 DIAGNOSIS — E03.9 ACQUIRED HYPOTHYROIDISM: Primary | ICD-10-CM

## 2024-11-05 ASSESSMENT — ENCOUNTER SYMPTOMS
NAUSEA: 0
BACK PAIN: 0
SORE THROAT: 0
DIARRHEA: 0
ABDOMINAL PAIN: 0
ABDOMINAL DISTENTION: 0
VOMITING: 0
BLOOD IN STOOL: 0
COLOR CHANGE: 0
SINUS PRESSURE: 0
RHINORRHEA: 0
ALLERGIC/IMMUNOLOGIC NEGATIVE: 1
CONSTIPATION: 0
TROUBLE SWALLOWING: 0

## 2024-11-05 NOTE — PROGRESS NOTES
Liane Booker presents today follow of syncope     Current Outpatient Medications   Medication Sig Dispense Refill    atorvastatin (LIPITOR) 40 MG tablet Take 1 tablet by mouth daily 90 tablet 0    buPROPion (WELLBUTRIN XL) 150 MG extended release tablet Take 1 tablet by mouth daily 90 tablet 0    clopidogrel (PLAVIX) 75 MG tablet Take 1 tablet by mouth daily 90 tablet 0    levothyroxine (SYNTHROID) 125 MCG tablet Take 1 tablet by mouth Daily 90 tablet 0    omeprazole (PRILOSEC) 40 MG delayed release capsule Take 1 capsule by mouth daily 90 capsule 0    gabapentin (NEURONTIN) 300 MG capsule Take 1 capsule by mouth in the morning and at bedtime for 90 days. 180 capsule 0    traZODone (DESYREL) 50 MG tablet Take 0.5 tablets by mouth daily 45 tablet 0    losartan (COZAAR) 25 MG tablet Take 1/2 (one-half) tablet by mouth once daily 45 tablet 0    furosemide (LASIX) 20 MG tablet Take 1 tablet by mouth daily 90 tablet 2    metoprolol succinate (TOPROL XL) 25 MG extended release tablet Take 0.5 tablets by mouth daily 45 tablet 1    spironolactone (ALDACTONE) 25 MG tablet Take 0.5 tablets by mouth daily 45 tablet 1    fluticasone (FLONASE) 50 MCG/ACT nasal spray 1 spray by Each Nostril route daily 1 each 2    diclofenac sodium (VOLTAREN) 1 % GEL Apply 2 g topically 2 times daily 350 g 1    EQ ASPIRIN ADULT LOW DOSE 81 MG EC tablet Take 1 tablet by mouth daily      docusate sodium (COLACE) 100 MG capsule Take 1 capsule by mouth 2 times daily      loratadine (CLARITIN) 10 MG tablet Take 1 tablet by mouth daily      acetaminophen (TYLENOL) 500 MG tablet Take 2 tablets by mouth nightly as needed for Pain      mirtazapine (REMERON) 30 MG tablet Take 1 tablet by mouth nightly 90 tablet 0    Multiple Vitamin (MULTI-VITAMIN DAILY PO) Take by mouth daily      Multiple Vitamins-Minerals (PRESERVISION AREDS 2 PO) Take by mouth in the morning and at bedtime      Biotin 1000 MCG TABS Take 1 tablet by mouth daily      Calcium-Vitamin

## 2024-12-03 ENCOUNTER — TELEPHONE (OUTPATIENT)
Dept: PHARMACY | Facility: CLINIC | Age: 89
End: 2024-12-03

## 2024-12-03 DIAGNOSIS — I10 HYPERTENSION, UNSPECIFIED TYPE: ICD-10-CM

## 2024-12-03 RX ORDER — LOSARTAN POTASSIUM 25 MG/1
TABLET ORAL
Qty: 45 TABLET | Refills: 0 | Status: SHIPPED | OUTPATIENT
Start: 2024-12-03

## 2024-12-03 NOTE — TELEPHONE ENCOUNTER
[facility] CLINICAL PHARMACY: ADHERENCE REVIEW  Identified care gap per [place]: fills at [facility]: ACE/ARB adherence    Patient also appears to be prescribed: Statin (Passed  adherence measure)     ASSESSMENT    ACE/ARB ADHERENCE    Insurance Records claims through 24 (Prior Year PDC = not reported; YTD PDC = 82%; Potential Fail Date: 24):   LOSARTAN POT TAB 25MG last filled on 24 for 90 day supply. Next refill due: 10/28/24    Prescribed si.5T QD    Per [facility]:  will send refill auth    [id number]    BP Readings from Last 3 Encounters:   24 124/60   10/11/24 (!) 103/57   24 (!) 118/58     Estimated Creatinine Clearance: 24 mL/min (A) (based on SCr of 1.2 mg/dL (H)).  Lab Results   Component Value Date    CREATININE 1.2 (H) 10/11/2024     Lab Results   Component Value Date    K 4.0 10/11/2024         The following are interventions that have been identified:   Patient OVERDUE refilling LOSARTAN POT TAB 25MG and active on home medication list.     Will outreach after approval    Last Visit: 24  Next Visit: 24    Meera Jones CPhT.   [facility]   [facility]  Toll free: [phone] Option 1

## 2024-12-04 ENCOUNTER — OFFICE VISIT (OUTPATIENT)
Dept: PRIMARY CARE CLINIC | Age: 89
End: 2024-12-04
Payer: MEDICARE

## 2024-12-04 VITALS
DIASTOLIC BLOOD PRESSURE: 70 MMHG | TEMPERATURE: 97.6 F | OXYGEN SATURATION: 97 % | SYSTOLIC BLOOD PRESSURE: 120 MMHG | HEART RATE: 67 BPM | BODY MASS INDEX: 26.05 KG/M2 | WEIGHT: 147 LBS | RESPIRATION RATE: 16 BRPM | HEIGHT: 63 IN

## 2024-12-04 DIAGNOSIS — E78.00 HIGH CHOLESTEROL: ICD-10-CM

## 2024-12-04 DIAGNOSIS — I50.22 CHRONIC HFREF (HEART FAILURE WITH REDUCED EJECTION FRACTION) (HCC): ICD-10-CM

## 2024-12-04 DIAGNOSIS — L98.9 SKIN LESION: Primary | ICD-10-CM

## 2024-12-04 DIAGNOSIS — I35.0 NONRHEUMATIC AORTIC VALVE STENOSIS: ICD-10-CM

## 2024-12-04 DIAGNOSIS — E03.9 HYPOTHYROIDISM, UNSPECIFIED TYPE: ICD-10-CM

## 2024-12-04 LAB
ALBUMIN: 3.9 G/DL (ref 3.5–5.2)
ALP BLD-CCNC: 80 U/L (ref 35–104)
ALT SERPL-CCNC: 22 U/L (ref 0–32)
ANION GAP SERPL CALCULATED.3IONS-SCNC: 9 MMOL/L (ref 7–16)
AST SERPL-CCNC: 23 U/L (ref 0–31)
BILIRUB SERPL-MCNC: 0.4 MG/DL (ref 0–1.2)
BUN BLDV-MCNC: 17 MG/DL (ref 6–23)
CALCIUM SERPL-MCNC: 9.1 MG/DL (ref 8.6–10.2)
CHLORIDE BLD-SCNC: 93 MMOL/L (ref 98–107)
CHOLESTEROL, TOTAL: 131 MG/DL
CO2: 31 MMOL/L (ref 22–29)
CREAT SERPL-MCNC: 1.1 MG/DL (ref 0.5–1)
GFR, ESTIMATED: 47 ML/MIN/1.73M2
GLUCOSE BLD-MCNC: 116 MG/DL (ref 74–99)
HDLC SERPL-MCNC: 54 MG/DL
LDL CHOLESTEROL: 59 MG/DL
POTASSIUM SERPL-SCNC: 4.2 MMOL/L (ref 3.5–5)
SODIUM BLD-SCNC: 133 MMOL/L (ref 132–146)
TOTAL PROTEIN: 6.6 G/DL (ref 6.4–8.3)
TRIGL SERPL-MCNC: 92 MG/DL
TSH SERPL DL<=0.05 MIU/L-ACNC: 3.11 UIU/ML (ref 0.27–4.2)
VLDLC SERPL CALC-MCNC: 18 MG/DL

## 2024-12-04 PROCEDURE — 99214 OFFICE O/P EST MOD 30 MIN: CPT | Performed by: INTERNAL MEDICINE

## 2024-12-04 PROCEDURE — 36415 COLL VENOUS BLD VENIPUNCTURE: CPT | Performed by: INTERNAL MEDICINE

## 2024-12-04 PROCEDURE — 1123F ACP DISCUSS/DSCN MKR DOCD: CPT | Performed by: INTERNAL MEDICINE

## 2024-12-04 PROCEDURE — 1159F MED LIST DOCD IN RCRD: CPT | Performed by: INTERNAL MEDICINE

## 2024-12-04 RX ORDER — TRAZODONE HYDROCHLORIDE 50 MG/1
25 TABLET, FILM COATED ORAL DAILY
Qty: 45 TABLET | Refills: 0 | Status: SHIPPED | OUTPATIENT
Start: 2024-12-04

## 2024-12-04 RX ORDER — GABAPENTIN 300 MG/1
300 CAPSULE ORAL 2 TIMES DAILY
Qty: 180 CAPSULE | Refills: 0 | Status: SHIPPED | OUTPATIENT
Start: 2024-12-04 | End: 2025-03-04

## 2024-12-04 RX ORDER — SPIRONOLACTONE 25 MG/1
12.5 TABLET ORAL DAILY
Qty: 45 TABLET | Refills: 1 | Status: SHIPPED | OUTPATIENT
Start: 2024-12-04

## 2024-12-04 ASSESSMENT — ENCOUNTER SYMPTOMS
VOMITING: 0
ABDOMINAL PAIN: 0
BLOOD IN STOOL: 0
TROUBLE SWALLOWING: 0
ALLERGIC/IMMUNOLOGIC NEGATIVE: 1
RHINORRHEA: 0
SHORTNESS OF BREATH: 1
NAUSEA: 0
SINUS PRESSURE: 0
BACK PAIN: 0
COLOR CHANGE: 0
ABDOMINAL DISTENTION: 0
CONSTIPATION: 0
DIARRHEA: 0
SORE THROAT: 0

## 2024-12-04 NOTE — PROGRESS NOTES
TABS Take 1 tablet by mouth daily      Calcium-Vitamin D 500-125 MG-UNIT TABS Take 1 tablet by mouth daily        No current facility-administered medications for this visit.      Past Medical History:   Diagnosis Date    Anxiety     Depression     DJD (degenerative joint disease)     Dysphagia 02/20/2014    GERD (gastroesophageal reflux disease) 02/20/2014    Hyperlipidemia     Hypertension     Hypothyroidism     Irritable bowel syndrome with constipation     Low vitamin D level     Osteoarthritis     Valvular heart disease           Subjective:  AS above.  Overall doing pretty good. Ambulates with walker, slowly. No increase in sob, no chest pain, minimal swelling, uses compression stockings.  No further syncopal episodes. Fasted for labs.        Review of Systems   Constitutional:  Negative for activity change, appetite change and chills.   HENT:  Negative for congestion, ear pain, mouth sores, postnasal drip, rhinorrhea, sinus pressure, sneezing, sore throat and trouble swallowing.    Eyes:  Negative for visual disturbance.   Respiratory:  Positive for shortness of breath.    Cardiovascular:  Negative for chest pain, palpitations and leg swelling.   Gastrointestinal:  Negative for abdominal distention, abdominal pain, blood in stool, constipation, diarrhea, nausea and vomiting.   Endocrine: Negative for cold intolerance, heat intolerance, polydipsia and polyuria.   Genitourinary:  Negative for difficulty urinating, dysuria, flank pain, frequency and urgency.   Musculoskeletal:  Positive for gait problem and neck pain. Negative for arthralgias, back pain and neck stiffness.        Rt shoulder pain   Skin: Negative.  Negative for color change.   Allergic/Immunologic: Negative.    Neurological:  Negative for dizziness, tremors, speech difficulty, weakness, light-headedness and headaches.   Hematological: Negative.    Psychiatric/Behavioral: Negative.            Objective:  /70   Pulse 67   Temp 97.6 °F

## 2024-12-10 ENCOUNTER — TELEPHONE (OUTPATIENT)
Dept: PRIMARY CARE CLINIC | Age: 89
End: 2024-12-10

## 2024-12-10 NOTE — TELEPHONE ENCOUNTER
Patients daughter requesting a call back at  [phone] to let her know when Liane is to resume her blood thinner. She was off of it for 5 days before her surgery. Thank you.

## 2024-12-11 RX ORDER — CIPROFLOXACIN 500 MG/1
500 TABLET, FILM COATED ORAL 2 TIMES DAILY
Qty: 20 TABLET | Refills: 0 | Status: SHIPPED | OUTPATIENT
Start: 2024-12-11 | End: 2024-12-21

## 2024-12-12 ENCOUNTER — TELEPHONE (OUTPATIENT)
Dept: PRIMARY CARE CLINIC | Age: 89
End: 2024-12-12

## 2024-12-12 DIAGNOSIS — R53.1 WEAKNESS: ICD-10-CM

## 2024-12-12 DIAGNOSIS — I25.10 ASHD (ARTERIOSCLEROTIC HEART DISEASE): Primary | ICD-10-CM

## 2024-12-12 DIAGNOSIS — M54.12 CERVICAL RADICULOPATHY: ICD-10-CM

## 2024-12-12 NOTE — TELEPHONE ENCOUNTER
Daughter requesting a referral for a wheelchair as Liane is starting to have trouble with her mobility. Please give Star a call back at [phone] to let her know if and when a referral for a wheelchair is placed. Thank you.

## 2024-12-13 ENCOUNTER — HOSPITAL ENCOUNTER (EMERGENCY)
Age: 89
Discharge: HOME | End: 2024-12-13
Attending: EMERGENCY MEDICINE
Payer: MEDICARE

## 2024-12-13 ENCOUNTER — APPOINTMENT (OUTPATIENT)
Dept: GENERAL RADIOLOGY | Age: 89
End: 2024-12-13
Payer: MEDICARE

## 2024-12-13 ENCOUNTER — APPOINTMENT (OUTPATIENT)
Dept: ULTRASOUND IMAGING | Age: 89
End: 2024-12-13
Payer: MEDICARE

## 2024-12-13 ENCOUNTER — NURSE ONLY (OUTPATIENT)
Dept: PRIMARY CARE CLINIC | Age: 89
End: 2024-12-13
Payer: MEDICARE

## 2024-12-13 VITALS
DIASTOLIC BLOOD PRESSURE: 67 MMHG | TEMPERATURE: 98 F | RESPIRATION RATE: 14 BRPM | SYSTOLIC BLOOD PRESSURE: 112 MMHG | OXYGEN SATURATION: 95 % | HEART RATE: 73 BPM

## 2024-12-13 DIAGNOSIS — E87.8 ELECTROLYTE IMBALANCE: Primary | ICD-10-CM

## 2024-12-13 DIAGNOSIS — R30.0 DYSURIA: Primary | ICD-10-CM

## 2024-12-13 DIAGNOSIS — I50.43 ACUTE ON CHRONIC COMBINED SYSTOLIC AND DIASTOLIC CHF (CONGESTIVE HEART FAILURE) (HCC): Primary | ICD-10-CM

## 2024-12-13 DIAGNOSIS — M79.605 PAIN OF LEFT LOWER EXTREMITY: ICD-10-CM

## 2024-12-13 LAB
ALBUMIN SERPL-MCNC: 3.7 G/DL (ref 3.5–5.2)
ALP SERPL-CCNC: 83 U/L (ref 35–104)
ALT SERPL-CCNC: 23 U/L (ref 0–32)
ANION GAP SERPL CALCULATED.3IONS-SCNC: 8 MMOL/L (ref 7–16)
AST SERPL-CCNC: 30 U/L (ref 0–31)
BASOPHILS # BLD: 0.05 K/UL (ref 0–0.2)
BASOPHILS NFR BLD: 1 % (ref 0–2)
BILIRUB SERPL-MCNC: 0.3 MG/DL (ref 0–1.2)
BILIRUBIN, POC: ABNORMAL
BLOOD URINE, POC: ABNORMAL
BNP SERPL-MCNC: 4930 PG/ML (ref 0–450)
BUN SERPL-MCNC: 22 MG/DL (ref 6–23)
CALCIUM SERPL-MCNC: 9.5 MG/DL (ref 8.6–10.2)
CHLORIDE SERPL-SCNC: 97 MMOL/L (ref 98–107)
CLARITY, POC: ABNORMAL
CO2 SERPL-SCNC: 30 MMOL/L (ref 22–29)
COLOR, POC: ABNORMAL
CREAT SERPL-MCNC: 1.2 MG/DL (ref 0.5–1)
EKG ATRIAL RATE: 76 BPM
EKG P AXIS: 65 DEGREES
EKG P-R INTERVAL: 144 MS
EKG Q-T INTERVAL: 426 MS
EKG QRS DURATION: 136 MS
EKG QTC CALCULATION (BAZETT): 479 MS
EKG R AXIS: 28 DEGREES
EKG T AXIS: 146 DEGREES
EKG VENTRICULAR RATE: 76 BPM
EOSINOPHIL # BLD: 0.48 K/UL (ref 0.05–0.5)
EOSINOPHILS RELATIVE PERCENT: 6 % (ref 0–6)
ERYTHROCYTE [DISTWIDTH] IN BLOOD BY AUTOMATED COUNT: 14.5 % (ref 11.5–15)
GFR, ESTIMATED: 42 ML/MIN/1.73M2
GLUCOSE SERPL-MCNC: 91 MG/DL (ref 74–99)
GLUCOSE URINE, POC: ABNORMAL MG/DL
HCT VFR BLD AUTO: 32.4 % (ref 34–48)
HGB BLD-MCNC: 10.7 G/DL (ref 11.5–15.5)
IMM GRANULOCYTES # BLD AUTO: 0.03 K/UL (ref 0–0.58)
IMM GRANULOCYTES NFR BLD: 0 % (ref 0–5)
KETONES, POC: ABNORMAL MG/DL
LEUKOCYTE EST, POC: ABNORMAL
LYMPHOCYTES NFR BLD: 1.28 K/UL (ref 1.5–4)
LYMPHOCYTES RELATIVE PERCENT: 16 % (ref 20–42)
MCH RBC QN AUTO: 33.2 PG (ref 26–35)
MCHC RBC AUTO-ENTMCNC: 33 G/DL (ref 32–34.5)
MCV RBC AUTO: 100.6 FL (ref 80–99.9)
MONOCYTES NFR BLD: 0.86 K/UL (ref 0.1–0.95)
MONOCYTES NFR BLD: 11 % (ref 2–12)
NEUTROPHILS NFR BLD: 67 % (ref 43–80)
NEUTS SEG NFR BLD: 5.46 K/UL (ref 1.8–7.3)
NITRITE, POC: POSITIVE
PH, POC: 7
PLATELET # BLD AUTO: 221 K/UL (ref 130–450)
PMV BLD AUTO: 10.2 FL (ref 7–12)
POTASSIUM SERPL-SCNC: 4.5 MMOL/L (ref 3.5–5)
PROT SERPL-MCNC: 6.3 G/DL (ref 6.4–8.3)
PROTEIN, POC: ABNORMAL MG/DL
RBC # BLD AUTO: 3.22 M/UL (ref 3.5–5.5)
SODIUM SERPL-SCNC: 135 MMOL/L (ref 132–146)
SPECIFIC GRAVITY, POC: 1.01
TROPONIN I SERPL HS-MCNC: 33 NG/L (ref 0–9)
UROBILINOGEN, POC: ABNORMAL MG/DL
WBC OTHER # BLD: 8.2 K/UL (ref 4.5–11.5)

## 2024-12-13 PROCEDURE — 73552 X-RAY EXAM OF FEMUR 2/>: CPT

## 2024-12-13 PROCEDURE — 99999 PR OFFICE/OUTPT VISIT,PROCEDURE ONLY: CPT | Performed by: INTERNAL MEDICINE

## 2024-12-13 PROCEDURE — 80053 COMPREHEN METABOLIC PANEL: CPT

## 2024-12-13 PROCEDURE — 71045 X-RAY EXAM CHEST 1 VIEW: CPT

## 2024-12-13 PROCEDURE — 93005 ELECTROCARDIOGRAM TRACING: CPT | Performed by: EMERGENCY MEDICINE

## 2024-12-13 PROCEDURE — 83880 ASSAY OF NATRIURETIC PEPTIDE: CPT

## 2024-12-13 PROCEDURE — 85025 COMPLETE CBC W/AUTO DIFF WBC: CPT

## 2024-12-13 PROCEDURE — 99285 EMERGENCY DEPT VISIT HI MDM: CPT

## 2024-12-13 PROCEDURE — 81002 URINALYSIS NONAUTO W/O SCOPE: CPT | Performed by: INTERNAL MEDICINE

## 2024-12-13 PROCEDURE — 93970 EXTREMITY STUDY: CPT

## 2024-12-13 PROCEDURE — 93010 ELECTROCARDIOGRAM REPORT: CPT | Performed by: INTERNAL MEDICINE

## 2024-12-13 PROCEDURE — 96374 THER/PROPH/DIAG INJ IV PUSH: CPT

## 2024-12-13 PROCEDURE — 84484 ASSAY OF TROPONIN QUANT: CPT

## 2024-12-13 PROCEDURE — 6360000002 HC RX W HCPCS: Performed by: EMERGENCY MEDICINE

## 2024-12-13 RX ORDER — FUROSEMIDE 10 MG/ML
40 INJECTION INTRAMUSCULAR; INTRAVENOUS ONCE
Status: COMPLETED | OUTPATIENT
Start: 2024-12-13 | End: 2024-12-13

## 2024-12-13 RX ADMIN — FUROSEMIDE 40 MG: 10 INJECTION, SOLUTION INTRAMUSCULAR; INTRAVENOUS at 14:01

## 2024-12-13 ASSESSMENT — PAIN DESCRIPTION - LOCATION: LOCATION: LEG

## 2024-12-13 ASSESSMENT — PAIN DESCRIPTION - FREQUENCY: FREQUENCY: CONTINUOUS

## 2024-12-13 ASSESSMENT — PAIN - FUNCTIONAL ASSESSMENT
PAIN_FUNCTIONAL_ASSESSMENT: NONE - DENIES PAIN
PAIN_FUNCTIONAL_ASSESSMENT: 0-10

## 2024-12-13 ASSESSMENT — PAIN SCALES - GENERAL
PAINLEVEL_OUTOF10: 8
PAINLEVEL_OUTOF10: 0

## 2024-12-13 ASSESSMENT — LIFESTYLE VARIABLES
HOW MANY STANDARD DRINKS CONTAINING ALCOHOL DO YOU HAVE ON A TYPICAL DAY: PATIENT DOES NOT DRINK
HOW OFTEN DO YOU HAVE A DRINK CONTAINING ALCOHOL: NEVER

## 2024-12-13 ASSESSMENT — PAIN DESCRIPTION - PAIN TYPE: TYPE: ACUTE PAIN

## 2024-12-13 ASSESSMENT — PAIN DESCRIPTION - DESCRIPTORS: DESCRIPTORS: DISCOMFORT;ACHING;SORE

## 2024-12-13 ASSESSMENT — PAIN DESCRIPTION - ONSET: ONSET: ON-GOING

## 2024-12-13 ASSESSMENT — PAIN DESCRIPTION - ORIENTATION: ORIENTATION: LEFT

## 2024-12-13 NOTE — ED PROVIDER NOTES
to the bathroom without any shortness of breath or difficulty walking.  Discussed that she is mildly fluid overloaded.  She has good oxygen saturations.  Through shared decision making, daughter would like to bring the patient home.  Plan to take a double dose of Lasix for the next 2 days and call her doctor Monday to schedule close follow-up.  I discussed with her daughter that she will need her electrolytes especially her potassium and her kidney function monitored closely on the increased dose of Lasix.  Strict ED return precautions discussed. [JA]      ED Course User Index  [JA] Vee Mccullough MD       Patient remained hemodynamically stable throughout ED course.     New Prescriptions    No medications on file       Counseling:   The emergency provider has spoken with the patient and daughter and discussed today’s results, in addition to providing specific details for the plan of care and counseling regarding the diagnosis and prognosis.  Questions are answered at this time and they are agreeable with the plan.      --------------------------------- IMPRESSION AND DISPOSITION ---------------------------------    IMPRESSION  1. Acute on chronic combined systolic and diastolic CHF (congestive heart failure) (HCC)    2. Pain of left lower extremity        DISPOSITION  Disposition: Discharge to home  Patient condition is stable      NOTE: This report was transcribed using voice recognition software. Every effort was made to ensure accuracy; however, inadvertent computerized transcription errors may be present    Vee LARKIN MD, am the primary provider of this record        Vee Mccullough MD  12/13/24 [id number]

## 2024-12-16 ENCOUNTER — NURSE ONLY (OUTPATIENT)
Dept: PRIMARY CARE CLINIC | Age: 89
End: 2024-12-16
Payer: MEDICARE

## 2024-12-16 DIAGNOSIS — E87.8 ELECTROLYTE IMBALANCE: ICD-10-CM

## 2024-12-16 LAB
ALBUMIN: 3.8 G/DL (ref 3.5–5.2)
ALP BLD-CCNC: 81 U/L (ref 35–104)
ALT SERPL-CCNC: 22 U/L (ref 0–32)
ANION GAP SERPL CALCULATED.3IONS-SCNC: 14 MMOL/L (ref 7–16)
AST SERPL-CCNC: 29 U/L (ref 0–31)
BILIRUB SERPL-MCNC: 0.3 MG/DL (ref 0–1.2)
BUN BLDV-MCNC: 24 MG/DL (ref 6–23)
CALCIUM SERPL-MCNC: 9.2 MG/DL (ref 8.6–10.2)
CHLORIDE BLD-SCNC: 94 MMOL/L (ref 98–107)
CO2: 26 MMOL/L (ref 22–29)
CREAT SERPL-MCNC: 1.3 MG/DL (ref 0.5–1)
GFR, ESTIMATED: 38 ML/MIN/1.73M2
GLUCOSE BLD-MCNC: 149 MG/DL (ref 74–99)
POTASSIUM SERPL-SCNC: 3.9 MMOL/L (ref 3.5–5)
SODIUM BLD-SCNC: 134 MMOL/L (ref 132–146)
TOTAL PROTEIN: 6.6 G/DL (ref 6.4–8.3)

## 2024-12-16 PROCEDURE — 36415 COLL VENOUS BLD VENIPUNCTURE: CPT | Performed by: INTERNAL MEDICINE

## 2024-12-17 ENCOUNTER — OFFICE VISIT (OUTPATIENT)
Dept: PRIMARY CARE CLINIC | Age: 89
End: 2024-12-17
Payer: MEDICARE

## 2024-12-17 VITALS
OXYGEN SATURATION: 96 % | HEART RATE: 61 BPM | TEMPERATURE: 97.6 F | BODY MASS INDEX: 26.75 KG/M2 | DIASTOLIC BLOOD PRESSURE: 68 MMHG | HEIGHT: 63 IN | WEIGHT: 151 LBS | SYSTOLIC BLOOD PRESSURE: 122 MMHG

## 2024-12-17 DIAGNOSIS — N18.32 STAGE 3B CHRONIC KIDNEY DISEASE (HCC): Primary | ICD-10-CM

## 2024-12-17 DIAGNOSIS — D64.9 ANEMIA, UNSPECIFIED TYPE: ICD-10-CM

## 2024-12-17 DIAGNOSIS — I50.22 CHRONIC HFREF (HEART FAILURE WITH REDUCED EJECTION FRACTION) (HCC): ICD-10-CM

## 2024-12-17 DIAGNOSIS — I25.10 CORONARY ARTERY DISEASE INVOLVING NATIVE CORONARY ARTERY OF NATIVE HEART WITHOUT ANGINA PECTORIS: ICD-10-CM

## 2024-12-17 PROCEDURE — 1123F ACP DISCUSS/DSCN MKR DOCD: CPT | Performed by: INTERNAL MEDICINE

## 2024-12-17 PROCEDURE — 99214 OFFICE O/P EST MOD 30 MIN: CPT | Performed by: INTERNAL MEDICINE

## 2024-12-17 PROCEDURE — 1159F MED LIST DOCD IN RCRD: CPT | Performed by: INTERNAL MEDICINE

## 2024-12-17 RX ORDER — MAGNESIUM CARB/ALUMINUM HYDROX 105-160MG
296 TABLET,CHEWABLE ORAL ONCE
Qty: 1 EACH | Refills: 1 | Status: SHIPPED | OUTPATIENT
Start: 2024-12-17 | End: 2024-12-17

## 2024-12-17 RX ORDER — METOPROLOL SUCCINATE 25 MG/1
12.5 TABLET, EXTENDED RELEASE ORAL DAILY
Qty: 45 TABLET | Refills: 1 | Status: SHIPPED | OUTPATIENT
Start: 2024-12-17

## 2024-12-17 RX ORDER — ATORVASTATIN CALCIUM 40 MG/1
40 TABLET, FILM COATED ORAL DAILY
Qty: 90 TABLET | Refills: 0 | Status: SHIPPED | OUTPATIENT
Start: 2024-12-17

## 2024-12-17 NOTE — PROGRESS NOTES
Liane Booker presents today for     Current Outpatient Medications   Medication Sig Dispense Refill    ciprofloxacin (CIPRO) 500 MG tablet Take 1 tablet by mouth 2 times daily for 10 days 20 tablet 0    gabapentin (NEURONTIN) 300 MG capsule Take 1 capsule by mouth in the morning and at bedtime for 90 days. 180 capsule 0    spironolactone (ALDACTONE) 25 MG tablet Take 0.5 tablets by mouth daily 45 tablet 1    traZODone (DESYREL) 50 MG tablet Take 0.5 tablets by mouth daily 45 tablet 0    losartan (COZAAR) 25 MG tablet Take 1/2 (one-half) tablet by mouth once daily 45 tablet 0    atorvastatin (LIPITOR) 40 MG tablet Take 1 tablet by mouth daily 90 tablet 0    buPROPion (WELLBUTRIN XL) 150 MG extended release tablet Take 1 tablet by mouth daily 90 tablet 0    clopidogrel (PLAVIX) 75 MG tablet Take 1 tablet by mouth daily 90 tablet 0    levothyroxine (SYNTHROID) 125 MCG tablet Take 1 tablet by mouth Daily 90 tablet 0    omeprazole (PRILOSEC) 40 MG delayed release capsule Take 1 capsule by mouth daily 90 capsule 0    furosemide (LASIX) 20 MG tablet Take 1 tablet by mouth daily 90 tablet 2    metoprolol succinate (TOPROL XL) 25 MG extended release tablet Take 0.5 tablets by mouth daily 45 tablet 1    fluticasone (FLONASE) 50 MCG/ACT nasal spray 1 spray by Each Nostril route daily 1 each 2    diclofenac sodium (VOLTAREN) 1 % GEL Apply 2 g topically 2 times daily 350 g 1    EQ ASPIRIN ADULT LOW DOSE 81 MG EC tablet Take 1 tablet by mouth daily      docusate sodium (COLACE) 100 MG capsule Take 1 capsule by mouth 2 times daily      loratadine (CLARITIN) 10 MG tablet Take 1 tablet by mouth daily      acetaminophen (TYLENOL) 500 MG tablet Take 2 tablets by mouth nightly as needed for Pain      mirtazapine (REMERON) 30 MG tablet Take 1 tablet by mouth nightly 90 tablet 0    Multiple Vitamin (MULTI-VITAMIN DAILY PO) Take by mouth daily      Multiple Vitamins-Minerals (PRESERVISION AREDS 2 PO) Take by mouth in the morning and

## 2025-01-06 RX ORDER — OMEPRAZOLE 40 MG/1
40 CAPSULE, DELAYED RELEASE ORAL DAILY
Qty: 90 CAPSULE | Refills: 0 | OUTPATIENT
Start: 2025-01-06

## 2025-01-14 ENCOUNTER — OFFICE VISIT (OUTPATIENT)
Dept: PRIMARY CARE CLINIC | Age: 89
End: 2025-01-14
Payer: MEDICARE

## 2025-01-14 VITALS
DIASTOLIC BLOOD PRESSURE: 58 MMHG | BODY MASS INDEX: 25.69 KG/M2 | TEMPERATURE: 97.6 F | HEART RATE: 71 BPM | HEIGHT: 63 IN | SYSTOLIC BLOOD PRESSURE: 122 MMHG | OXYGEN SATURATION: 95 % | WEIGHT: 145 LBS

## 2025-01-14 DIAGNOSIS — I50.43 ACUTE ON CHRONIC COMBINED SYSTOLIC AND DIASTOLIC CHF (CONGESTIVE HEART FAILURE) (HCC): Primary | ICD-10-CM

## 2025-01-14 DIAGNOSIS — I10 HYPERTENSION, UNSPECIFIED TYPE: ICD-10-CM

## 2025-01-14 DIAGNOSIS — N18.32 STAGE 3B CHRONIC KIDNEY DISEASE (HCC): ICD-10-CM

## 2025-01-14 PROCEDURE — 99214 OFFICE O/P EST MOD 30 MIN: CPT | Performed by: INTERNAL MEDICINE

## 2025-01-14 PROCEDURE — 1123F ACP DISCUSS/DSCN MKR DOCD: CPT | Performed by: INTERNAL MEDICINE

## 2025-01-14 PROCEDURE — 1159F MED LIST DOCD IN RCRD: CPT | Performed by: INTERNAL MEDICINE

## 2025-01-14 SDOH — ECONOMIC STABILITY: FOOD INSECURITY: WITHIN THE PAST 12 MONTHS, THE FOOD YOU BOUGHT JUST DIDN'T LAST AND YOU DIDN'T HAVE MONEY TO GET MORE.: NEVER TRUE

## 2025-01-14 SDOH — ECONOMIC STABILITY: FOOD INSECURITY: WITHIN THE PAST 12 MONTHS, YOU WORRIED THAT YOUR FOOD WOULD RUN OUT BEFORE YOU GOT MONEY TO BUY MORE.: NEVER TRUE

## 2025-01-14 ASSESSMENT — PATIENT HEALTH QUESTIONNAIRE - PHQ9
2. FEELING DOWN, DEPRESSED OR HOPELESS: NOT AT ALL
SUM OF ALL RESPONSES TO PHQ QUESTIONS 1-9: 0
SUM OF ALL RESPONSES TO PHQ QUESTIONS 1-9: 0
SUM OF ALL RESPONSES TO PHQ9 QUESTIONS 1 & 2: 0
1. LITTLE INTEREST OR PLEASURE IN DOING THINGS: NOT AT ALL
SUM OF ALL RESPONSES TO PHQ QUESTIONS 1-9: 0
SUM OF ALL RESPONSES TO PHQ QUESTIONS 1-9: 0

## 2025-01-14 ASSESSMENT — ENCOUNTER SYMPTOMS
ABDOMINAL PAIN: 0
VOMITING: 0
BACK PAIN: 0
NAUSEA: 0
SORE THROAT: 0
CONSTIPATION: 0
COLOR CHANGE: 0
BLOOD IN STOOL: 0
SINUS PRESSURE: 0
ABDOMINAL DISTENTION: 0
DIARRHEA: 0
RHINORRHEA: 0
SHORTNESS OF BREATH: 1
ALLERGIC/IMMUNOLOGIC NEGATIVE: 1
TROUBLE SWALLOWING: 0

## 2025-01-14 NOTE — PROGRESS NOTES
Liane ANAHI Booker presents today for   Follow up of dyspnea, valvular heart disease, HTN, ASHD  Current Outpatient Medications   Medication Sig Dispense Refill    metoprolol succinate (TOPROL XL) 25 MG extended release tablet Take 0.5 tablets by mouth daily 45 tablet 1    atorvastatin (LIPITOR) 40 MG tablet Take 1 tablet by mouth daily 90 tablet 0    gabapentin (NEURONTIN) 300 MG capsule Take 1 capsule by mouth in the morning and at bedtime for 90 days. 180 capsule 0    spironolactone (ALDACTONE) 25 MG tablet Take 0.5 tablets by mouth daily 45 tablet 1    traZODone (DESYREL) 50 MG tablet Take 0.5 tablets by mouth daily 45 tablet 0    losartan (COZAAR) 25 MG tablet Take 1/2 (one-half) tablet by mouth once daily 45 tablet 0    buPROPion (WELLBUTRIN XL) 150 MG extended release tablet Take 1 tablet by mouth daily 90 tablet 0    clopidogrel (PLAVIX) 75 MG tablet Take 1 tablet by mouth daily 90 tablet 0    levothyroxine (SYNTHROID) 125 MCG tablet Take 1 tablet by mouth Daily 90 tablet 0    omeprazole (PRILOSEC) 40 MG delayed release capsule Take 1 capsule by mouth daily 90 capsule 0    furosemide (LASIX) 20 MG tablet Take 1 tablet by mouth daily 90 tablet 2    fluticasone (FLONASE) 50 MCG/ACT nasal spray 1 spray by Each Nostril route daily 1 each 2    diclofenac sodium (VOLTAREN) 1 % GEL Apply 2 g topically 2 times daily 350 g 1    EQ ASPIRIN ADULT LOW DOSE 81 MG EC tablet Take 1 tablet by mouth daily      docusate sodium (COLACE) 100 MG capsule Take 1 capsule by mouth 2 times daily      loratadine (CLARITIN) 10 MG tablet Take 1 tablet by mouth daily      mirtazapine (REMERON) 30 MG tablet Take 1 tablet by mouth nightly 90 tablet 0    Multiple Vitamin (MULTI-VITAMIN DAILY PO) Take by mouth daily      Multiple Vitamins-Minerals (PRESERVISION AREDS 2 PO) Take by mouth in the morning and at bedtime      Biotin 1000 MCG TABS Take 1 tablet by mouth daily      Calcium-Vitamin D 500-125 MG-UNIT TABS Take 1 tablet by mouth daily

## 2025-01-22 DIAGNOSIS — F41.9 ANXIETY: ICD-10-CM

## 2025-01-22 RX ORDER — LEVOTHYROXINE SODIUM 125 UG/1
125 TABLET ORAL DAILY
Qty: 90 TABLET | Refills: 0 | Status: SHIPPED | OUTPATIENT
Start: 2025-01-22

## 2025-01-22 RX ORDER — OMEPRAZOLE 40 MG/1
40 CAPSULE, DELAYED RELEASE ORAL DAILY
Qty: 90 CAPSULE | Refills: 0 | Status: SHIPPED | OUTPATIENT
Start: 2025-01-22

## 2025-01-22 RX ORDER — BUPROPION HYDROCHLORIDE 150 MG/1
150 TABLET ORAL DAILY
Qty: 90 TABLET | Refills: 0 | Status: SHIPPED | OUTPATIENT
Start: 2025-01-22

## 2025-01-22 NOTE — TELEPHONE ENCOUNTER
Name of Medication(s) Requested:  Requested Prescriptions     Pending Prescriptions Disp Refills    buPROPion (WELLBUTRIN XL) 150 MG extended release tablet [Pharmacy Med Name: buPROPion HCl ER (XL) 150 MG Oral Tablet Extended Release 24 Hour] 90 tablet 0     Sig: Take 1 tablet by mouth once daily    levothyroxine (SYNTHROID) 125 MCG tablet [Pharmacy Med Name: Levothyroxine Sodium 125 MCG Oral Tablet] 90 tablet 0     Sig: Take 1 tablet by mouth once daily    omeprazole (PRILOSEC) 40 MG delayed release capsule [Pharmacy Med Name: OMEPRAZOLE DR 40MG  CAP] 90 capsule 0     Sig: Take 1 capsule by mouth once daily       Medication is on current medication list Yes    Dosage and directions were verified? Yes    Quantity verified: 90 day supply     Pharmacy Verified?  Yes    Last Appointment:  1/14/2025    Future appts:  Future Appointments   Date Time Provider Department Center   2/12/2025  2:30 PM Shaylee Salgado MD [facility] CARDIO [facility]   2/19/2025 10:45 AM Linda Santiago MD [facility] DEP   6/27/2025  1:00 PM Linda Santiago MD [facility] DEP        (If no appt send self scheduling link. .REFILLAPPT)  Scheduling request sent?     [] Yes  [x] No    Does patient need updated?  [] Yes  [x] No

## 2025-02-12 ENCOUNTER — HOSPITAL ENCOUNTER (INPATIENT)
Age: 89
LOS: 8 days | Discharge: HOME | DRG: 291 | End: 2025-02-20
Attending: EMERGENCY MEDICINE | Admitting: INTERNAL MEDICINE
Payer: MEDICARE

## 2025-02-12 ENCOUNTER — APPOINTMENT (OUTPATIENT)
Dept: GENERAL RADIOLOGY | Age: 89
DRG: 291 | End: 2025-02-12
Payer: MEDICARE

## 2025-02-12 DIAGNOSIS — I50.43 ACUTE ON CHRONIC COMBINED SYSTOLIC AND DIASTOLIC CHF (CONGESTIVE HEART FAILURE) (HCC): ICD-10-CM

## 2025-02-12 DIAGNOSIS — I50.23 ACUTE ON CHRONIC SYSTOLIC CONGESTIVE HEART FAILURE (HCC): ICD-10-CM

## 2025-02-12 DIAGNOSIS — R06.02 SHORTNESS OF BREATH: Primary | ICD-10-CM

## 2025-02-12 DIAGNOSIS — I35.0 NONRHEUMATIC AORTIC VALVE STENOSIS: ICD-10-CM

## 2025-02-12 DIAGNOSIS — I50.9 ACUTE EXACERBATION OF CHRONIC HEART FAILURE (HCC): ICD-10-CM

## 2025-02-12 DIAGNOSIS — R11.0 NAUSEA: ICD-10-CM

## 2025-02-12 LAB
ALBUMIN SERPL-MCNC: 4.1 G/DL (ref 3.5–5.2)
ALP SERPL-CCNC: 69 U/L (ref 35–104)
ALT SERPL-CCNC: 38 U/L (ref 0–32)
ANION GAP SERPL CALCULATED.3IONS-SCNC: 12 MMOL/L (ref 7–16)
AST SERPL-CCNC: 35 U/L (ref 0–31)
B PARAP IS1001 DNA NPH QL NAA+NON-PROBE: NOT DETECTED
B PERT DNA SPEC QL NAA+PROBE: NOT DETECTED
BASOPHILS # BLD: 0.01 K/UL (ref 0–0.2)
BASOPHILS NFR BLD: 0 % (ref 0–2)
BILIRUB SERPL-MCNC: 0.3 MG/DL (ref 0–1.2)
BNP SERPL-MCNC: ABNORMAL PG/ML (ref 0–450)
BUN SERPL-MCNC: 20 MG/DL (ref 6–23)
C PNEUM DNA NPH QL NAA+NON-PROBE: NOT DETECTED
CALCIUM SERPL-MCNC: 9 MG/DL (ref 8.6–10.2)
CHLORIDE SERPL-SCNC: 93 MMOL/L (ref 98–107)
CO2 SERPL-SCNC: 29 MMOL/L (ref 22–29)
CREAT SERPL-MCNC: 1 MG/DL (ref 0.5–1)
EKG ATRIAL RATE: 93 BPM
EKG P AXIS: 76 DEGREES
EKG P-R INTERVAL: 150 MS
EKG Q-T INTERVAL: 396 MS
EKG QRS DURATION: 138 MS
EKG QTC CALCULATION (BAZETT): 492 MS
EKG R AXIS: 69 DEGREES
EKG T AXIS: 90 DEGREES
EKG VENTRICULAR RATE: 93 BPM
EOSINOPHIL # BLD: 0 K/UL (ref 0.05–0.5)
EOSINOPHILS RELATIVE PERCENT: 0 % (ref 0–6)
ERYTHROCYTE [DISTWIDTH] IN BLOOD BY AUTOMATED COUNT: 14.7 % (ref 11.5–15)
FLUAV RNA NPH QL NAA+NON-PROBE: NOT DETECTED
FLUBV RNA NPH QL NAA+NON-PROBE: NOT DETECTED
GFR, ESTIMATED: 50 ML/MIN/1.73M2
GLUCOSE SERPL-MCNC: 148 MG/DL (ref 74–99)
HADV DNA NPH QL NAA+NON-PROBE: NOT DETECTED
HCOV 229E RNA NPH QL NAA+NON-PROBE: NOT DETECTED
HCOV HKU1 RNA NPH QL NAA+NON-PROBE: NOT DETECTED
HCOV NL63 RNA NPH QL NAA+NON-PROBE: NOT DETECTED
HCOV OC43 RNA NPH QL NAA+NON-PROBE: NOT DETECTED
HCT VFR BLD AUTO: 31.9 % (ref 34–48)
HGB BLD-MCNC: 10.4 G/DL (ref 11.5–15.5)
HMPV RNA NPH QL NAA+NON-PROBE: NOT DETECTED
HPIV1 RNA NPH QL NAA+NON-PROBE: NOT DETECTED
HPIV2 RNA NPH QL NAA+NON-PROBE: NOT DETECTED
HPIV3 RNA NPH QL NAA+NON-PROBE: NOT DETECTED
HPIV4 RNA NPH QL NAA+NON-PROBE: NOT DETECTED
IMM GRANULOCYTES # BLD AUTO: 0.05 K/UL (ref 0–0.58)
IMM GRANULOCYTES NFR BLD: 1 % (ref 0–5)
LACTATE BLDV-SCNC: 1.9 MMOL/L (ref 0.5–2.2)
LYMPHOCYTES NFR BLD: 0.82 K/UL (ref 1.5–4)
LYMPHOCYTES RELATIVE PERCENT: 10 % (ref 20–42)
M PNEUMO DNA NPH QL NAA+NON-PROBE: NOT DETECTED
MCH RBC QN AUTO: 31.1 PG (ref 26–35)
MCHC RBC AUTO-ENTMCNC: 32.6 G/DL (ref 32–34.5)
MCV RBC AUTO: 95.5 FL (ref 80–99.9)
MONOCYTES NFR BLD: 0.78 K/UL (ref 0.1–0.95)
MONOCYTES NFR BLD: 10 % (ref 2–12)
NEUTROPHILS NFR BLD: 80 % (ref 43–80)
NEUTS SEG NFR BLD: 6.5 K/UL (ref 1.8–7.3)
PLATELET # BLD AUTO: 240 K/UL (ref 130–450)
PMV BLD AUTO: 11.3 FL (ref 7–12)
POTASSIUM SERPL-SCNC: 4 MMOL/L (ref 3.5–5)
PROT SERPL-MCNC: 6.7 G/DL (ref 6.4–8.3)
RBC # BLD AUTO: 3.34 M/UL (ref 3.5–5.5)
RSV RNA NPH QL NAA+NON-PROBE: NOT DETECTED
RV+EV RNA NPH QL NAA+NON-PROBE: NOT DETECTED
SARS-COV-2 RNA NPH QL NAA+NON-PROBE: NOT DETECTED
SODIUM SERPL-SCNC: 134 MMOL/L (ref 132–146)
SPECIMEN DESCRIPTION: NORMAL
TROPONIN I SERPL HS-MCNC: 47 NG/L (ref 0–9)
TROPONIN I SERPL HS-MCNC: 53 NG/L (ref 0–9)
TROPONIN I SERPL HS-MCNC: 53 NG/L (ref 0–9)
TROPONIN I SERPL HS-MCNC: 61 NG/L (ref 0–9)
WBC OTHER # BLD: 8.2 K/UL (ref 4.5–11.5)

## 2025-02-12 PROCEDURE — 99222 1ST HOSP IP/OBS MODERATE 55: CPT | Performed by: NURSE PRACTITIONER

## 2025-02-12 PROCEDURE — 36415 COLL VENOUS BLD VENIPUNCTURE: CPT

## 2025-02-12 PROCEDURE — 99285 EMERGENCY DEPT VISIT HI MDM: CPT

## 2025-02-12 PROCEDURE — 84484 ASSAY OF TROPONIN QUANT: CPT

## 2025-02-12 PROCEDURE — 2500000003 HC RX 250 WO HCPCS: Performed by: NURSE PRACTITIONER

## 2025-02-12 PROCEDURE — 80053 COMPREHEN METABOLIC PANEL: CPT

## 2025-02-12 PROCEDURE — 6370000000 HC RX 637 (ALT 250 FOR IP): Performed by: INTERNAL MEDICINE

## 2025-02-12 PROCEDURE — 71045 X-RAY EXAM CHEST 1 VIEW: CPT

## 2025-02-12 PROCEDURE — 85025 COMPLETE CBC W/AUTO DIFF WBC: CPT

## 2025-02-12 PROCEDURE — 6360000002 HC RX W HCPCS

## 2025-02-12 PROCEDURE — 6370000000 HC RX 637 (ALT 250 FOR IP): Performed by: NURSE PRACTITIONER

## 2025-02-12 PROCEDURE — 96374 THER/PROPH/DIAG INJ IV PUSH: CPT

## 2025-02-12 PROCEDURE — 93005 ELECTROCARDIOGRAM TRACING: CPT | Performed by: INTERNAL MEDICINE

## 2025-02-12 PROCEDURE — 0202U NFCT DS 22 TRGT SARS-COV-2: CPT

## 2025-02-12 PROCEDURE — 83880 ASSAY OF NATRIURETIC PEPTIDE: CPT

## 2025-02-12 PROCEDURE — 2140000000 HC CCU INTERMEDIATE R&B

## 2025-02-12 PROCEDURE — 83605 ASSAY OF LACTIC ACID: CPT

## 2025-02-12 PROCEDURE — 93005 ELECTROCARDIOGRAM TRACING: CPT

## 2025-02-12 PROCEDURE — 93010 ELECTROCARDIOGRAM REPORT: CPT | Performed by: INTERNAL MEDICINE

## 2025-02-12 RX ORDER — ATORVASTATIN CALCIUM 40 MG/1
40 TABLET, FILM COATED ORAL DAILY
Status: DISCONTINUED | OUTPATIENT
Start: 2025-02-12 | End: 2025-02-20 | Stop reason: HOSPADM

## 2025-02-12 RX ORDER — TRAZODONE HYDROCHLORIDE 50 MG/1
25 TABLET ORAL DAILY
Status: DISCONTINUED | OUTPATIENT
Start: 2025-02-12 | End: 2025-02-20 | Stop reason: HOSPADM

## 2025-02-12 RX ORDER — CLOPIDOGREL BISULFATE 75 MG/1
75 TABLET ORAL DAILY
Status: DISCONTINUED | OUTPATIENT
Start: 2025-02-12 | End: 2025-02-20 | Stop reason: HOSPADM

## 2025-02-12 RX ORDER — BENZONATATE 100 MG/1
100 CAPSULE ORAL 3 TIMES DAILY PRN
Status: DISCONTINUED | OUTPATIENT
Start: 2025-02-12 | End: 2025-02-20 | Stop reason: HOSPADM

## 2025-02-12 RX ORDER — SODIUM CHLORIDE 0.9 % (FLUSH) 0.9 %
5-40 SYRINGE (ML) INJECTION EVERY 12 HOURS SCHEDULED
Status: DISCONTINUED | OUTPATIENT
Start: 2025-02-12 | End: 2025-02-20 | Stop reason: HOSPADM

## 2025-02-12 RX ORDER — CETIRIZINE HYDROCHLORIDE 10 MG/1
10 TABLET ORAL DAILY
Status: DISCONTINUED | OUTPATIENT
Start: 2025-02-12 | End: 2025-02-20 | Stop reason: HOSPADM

## 2025-02-12 RX ORDER — ONDANSETRON 4 MG/1
4 TABLET, ORALLY DISINTEGRATING ORAL EVERY 8 HOURS PRN
Status: DISCONTINUED | OUTPATIENT
Start: 2025-02-12 | End: 2025-02-20 | Stop reason: HOSPADM

## 2025-02-12 RX ORDER — METOPROLOL SUCCINATE 25 MG/1
12.5 TABLET, EXTENDED RELEASE ORAL DAILY
Status: DISCONTINUED | OUTPATIENT
Start: 2025-02-12 | End: 2025-02-20 | Stop reason: HOSPADM

## 2025-02-12 RX ORDER — ENOXAPARIN SODIUM 100 MG/ML
30 INJECTION SUBCUTANEOUS DAILY
Status: DISCONTINUED | OUTPATIENT
Start: 2025-02-12 | End: 2025-02-20 | Stop reason: HOSPADM

## 2025-02-12 RX ORDER — GABAPENTIN 300 MG/1
300 CAPSULE ORAL 2 TIMES DAILY
Status: DISCONTINUED | OUTPATIENT
Start: 2025-02-12 | End: 2025-02-20 | Stop reason: HOSPADM

## 2025-02-12 RX ORDER — ACETAMINOPHEN 325 MG/1
650 TABLET ORAL EVERY 6 HOURS PRN
Status: DISCONTINUED | OUTPATIENT
Start: 2025-02-12 | End: 2025-02-20 | Stop reason: HOSPADM

## 2025-02-12 RX ORDER — CALCIUM CARBONATE 500 MG/1
500 TABLET, CHEWABLE ORAL 3 TIMES DAILY PRN
Status: DISCONTINUED | OUTPATIENT
Start: 2025-02-12 | End: 2025-02-20 | Stop reason: HOSPADM

## 2025-02-12 RX ORDER — HYDRALAZINE HYDROCHLORIDE 20 MG/ML
10 INJECTION INTRAMUSCULAR; INTRAVENOUS EVERY 6 HOURS PRN
Status: DISCONTINUED | OUTPATIENT
Start: 2025-02-12 | End: 2025-02-20 | Stop reason: HOSPADM

## 2025-02-12 RX ORDER — FUROSEMIDE 10 MG/ML
40 INJECTION INTRAMUSCULAR; INTRAVENOUS DAILY
Status: DISCONTINUED | OUTPATIENT
Start: 2025-02-13 | End: 2025-02-12

## 2025-02-12 RX ORDER — SODIUM CHLORIDE 9 MG/ML
INJECTION, SOLUTION INTRAVENOUS PRN
Status: DISCONTINUED | OUTPATIENT
Start: 2025-02-12 | End: 2025-02-20 | Stop reason: HOSPADM

## 2025-02-12 RX ORDER — FUROSEMIDE 10 MG/ML
20 INJECTION INTRAMUSCULAR; INTRAVENOUS ONCE
Status: COMPLETED | OUTPATIENT
Start: 2025-02-12 | End: 2025-02-12

## 2025-02-12 RX ORDER — ACETAMINOPHEN 650 MG/1
650 SUPPOSITORY RECTAL EVERY 6 HOURS PRN
Status: DISCONTINUED | OUTPATIENT
Start: 2025-02-12 | End: 2025-02-20 | Stop reason: HOSPADM

## 2025-02-12 RX ORDER — SODIUM CHLORIDE 0.9 % (FLUSH) 0.9 %
5-40 SYRINGE (ML) INJECTION PRN
Status: DISCONTINUED | OUTPATIENT
Start: 2025-02-12 | End: 2025-02-20 | Stop reason: HOSPADM

## 2025-02-12 RX ORDER — PANTOPRAZOLE SODIUM 40 MG/1
40 TABLET, DELAYED RELEASE ORAL
Status: DISCONTINUED | OUTPATIENT
Start: 2025-02-13 | End: 2025-02-20 | Stop reason: HOSPADM

## 2025-02-12 RX ORDER — POLYETHYLENE GLYCOL 3350 17 G/17G
17 POWDER, FOR SOLUTION ORAL DAILY PRN
Status: DISCONTINUED | OUTPATIENT
Start: 2025-02-12 | End: 2025-02-13

## 2025-02-12 RX ORDER — BUPROPION HYDROCHLORIDE 150 MG/1
150 TABLET ORAL DAILY
Status: DISCONTINUED | OUTPATIENT
Start: 2025-02-12 | End: 2025-02-20 | Stop reason: HOSPADM

## 2025-02-12 RX ORDER — ONDANSETRON 2 MG/ML
4 INJECTION INTRAMUSCULAR; INTRAVENOUS EVERY 6 HOURS PRN
Status: DISCONTINUED | OUTPATIENT
Start: 2025-02-12 | End: 2025-02-20 | Stop reason: HOSPADM

## 2025-02-12 RX ORDER — MIRTAZAPINE 15 MG/1
30 TABLET, FILM COATED ORAL NIGHTLY
Status: DISCONTINUED | OUTPATIENT
Start: 2025-02-12 | End: 2025-02-20 | Stop reason: HOSPADM

## 2025-02-12 RX ORDER — FUROSEMIDE 10 MG/ML
20 INJECTION INTRAMUSCULAR; INTRAVENOUS DAILY
Status: DISCONTINUED | OUTPATIENT
Start: 2025-02-13 | End: 2025-02-15

## 2025-02-12 RX ADMIN — ACETAMINOPHEN 650 MG: 325 TABLET ORAL at 21:59

## 2025-02-12 RX ADMIN — BENZOCAINE AND MENTHOL 1 LOZENGE: 15; 3.6 LOZENGE ORAL at 22:01

## 2025-02-12 RX ADMIN — BENZONATATE 100 MG: 100 CAPSULE ORAL at 22:01

## 2025-02-12 RX ADMIN — GABAPENTIN 300 MG: 300 CAPSULE ORAL at 21:59

## 2025-02-12 RX ADMIN — SODIUM CHLORIDE, PRESERVATIVE FREE 10 ML: 5 INJECTION INTRAVENOUS at 22:43

## 2025-02-12 RX ADMIN — FUROSEMIDE 20 MG: 10 INJECTION, SOLUTION INTRAMUSCULAR; INTRAVENOUS at 11:45

## 2025-02-12 RX ADMIN — CALCIUM CARBONATE 500 MG: 500 TABLET, CHEWABLE ORAL at 21:59

## 2025-02-12 ASSESSMENT — PAIN - FUNCTIONAL ASSESSMENT
PAIN_FUNCTIONAL_ASSESSMENT: PREVENTS OR INTERFERES SOME ACTIVE ACTIVITIES AND ADLS
PAIN_FUNCTIONAL_ASSESSMENT: NONE - DENIES PAIN

## 2025-02-12 ASSESSMENT — PAIN DESCRIPTION - FREQUENCY: FREQUENCY: INTERMITTENT

## 2025-02-12 ASSESSMENT — PAIN DESCRIPTION - DESCRIPTORS: DESCRIPTORS: ACHING

## 2025-02-12 ASSESSMENT — PAIN DESCRIPTION - LOCATION: LOCATION: SHOULDER;NECK

## 2025-02-12 ASSESSMENT — PAIN SCALES - GENERAL: PAINLEVEL_OUTOF10: 5

## 2025-02-12 ASSESSMENT — PAIN DESCRIPTION - ORIENTATION: ORIENTATION: RIGHT;LEFT

## 2025-02-12 ASSESSMENT — PAIN DESCRIPTION - PAIN TYPE: TYPE: CHRONIC PAIN

## 2025-02-12 NOTE — H&P
[facility] Hospitalist Group History and Physical      CHIEF COMPLAINT:  generalized feeling of unwell    History of Present Illness: This is a 89-year-old female with a past medical history of anxiety, depression, GERD, HLD, HTN, hypothyroidism, dysphagia, valvular heart disease, and OA who presents to the ED with nausea and a general feeling of unwell.  Patient's symptoms started yesterday after she had a nerve block procedure for her neck. She states her nausea started as she was coming out of anesthesia. Patient does complain of mild nausea this morning without any vomiting. Her main complaint today is just a generalized feeling of unwell.  Patient states she has been experiencing increased bilateral lower extremity swelling as well as orthopnea. She c/o sob with exertion and at rest. She has been compliant with her diuretic medications outpatient but her urine may not be adequate.  She follows outpatient with Dr. Salgado but does not follow with the CHF clinic.      ED course is as follows: T97.6, HR 78, /66, SpO2 98% on room air.  Chloride 93, glucose 148, BNP 26,029, troponin 47> 61, ALT 38, AST 35, Hgb 10.4.  CXR negative. Lasix 20 mg given in ED    Patient will be admitted for further management.    Informant(s) for H&P:Patient    REVIEW OF SYSTEMS:  A comprehensive review of systems was negative except for: what is in the HPI      PMH:  Past Medical History:   Diagnosis Date    Anxiety     Depression     DJD (degenerative joint disease)     Dysphagia 02/20/2014    GERD (gastroesophageal reflux disease) 02/20/2014    Hyperlipidemia     Hypertension     Hypothyroidism     Irritable bowel syndrome with constipation     Low vitamin D level     Osteoarthritis     Valvular heart disease        Surgical History:  Past Surgical History:   Procedure Laterality Date    APPENDECTOMY      CARPAL TUNNEL RELEASE Right 10/05/2016    RIGHT INDEX FINGER TRIGGER RELEASE RIGHT THUMB CARPAL METACARPAL

## 2025-02-12 NOTE — ED PROVIDER NOTES
[facility]  EMERGENCY DEPARTMENT ENCOUNTER        Pt Name: Liane Booker  MRN: [id number]  Birthdate 4/15/1926  Date of evaluation: 2/12/2025  Provider: Kimo Flores DO  PCP: Linda Santiago MD  Note Started: 7:36 AM EST 2/12/25    CHIEF COMPLAINT       Chief Complaint   Patient presents with    Nausea     (Had nerve block yesterday felt nauseous afterwards, went away now back, \"just dont feel right\", denies chest pain, denies SOB)       HISTORY OF PRESENT ILLNESS: 1 or more Elements   History received from: Patient    Liane Booker is a 89 y.o. female who presents to the emergency department with chief complaint of generalized illness.  Per daughter, the symptoms seem to start yesterday after patient had a nerve block in her neck and did undergo anesthesia for this.  Patient awaking from anesthesia did have nausea and had some vomiting without blood or bilious content that has gone away.  Patient did have mild nausea this morning but is not having any vomiting or abdominal pain.  Patient main complaint of just feeling unwell stating that she feels worse if she lies flat.  She states that she has had a little bit of increased swelling in her legs compared to normal but otherwise has not had any other symptoms.  Denies any fever, chills, chest pain, shortness of breath, abdominal pain, hematuria or dysuria, constipation or diarrhea.    Nursing Notes were all reviewed and agreed with or any disagreements were addressed in the HPI.    REVIEW OF SYSTEMS :    Positives and Pertinent negatives as per HPI.    PAST MEDICAL HISTORY/Chronic Conditions Affecting Care    has a past medical history of Anxiety, Depression, DJD (degenerative joint disease), Dysphagia (02/20/2014), GERD (gastroesophageal reflux disease) (02/20/2014), Hyperlipidemia, Hypertension, Hypothyroidism, Irritable bowel syndrome with constipation, Low vitamin D level, Osteoarthritis, and Valvular heart disease.     SURGICAL HISTORY     Past

## 2025-02-13 PROBLEM — R06.02 SHORTNESS OF BREATH: Status: ACTIVE | Noted: 2025-02-13

## 2025-02-13 LAB
ANION GAP SERPL CALCULATED.3IONS-SCNC: 11 MMOL/L (ref 7–16)
BASOPHILS # BLD: 0.04 K/UL (ref 0–0.2)
BASOPHILS NFR BLD: 0 % (ref 0–2)
BUN SERPL-MCNC: 21 MG/DL (ref 6–23)
CALCIUM SERPL-MCNC: 8.7 MG/DL (ref 8.6–10.2)
CHLORIDE SERPL-SCNC: 94 MMOL/L (ref 98–107)
CO2 SERPL-SCNC: 29 MMOL/L (ref 22–29)
CREAT SERPL-MCNC: 1 MG/DL (ref 0.5–1)
EKG ATRIAL RATE: 94 BPM
EKG P AXIS: 59 DEGREES
EKG P-R INTERVAL: 142 MS
EKG Q-T INTERVAL: 392 MS
EKG QRS DURATION: 136 MS
EKG QTC CALCULATION (BAZETT): 490 MS
EKG R AXIS: 41 DEGREES
EKG T AXIS: -168 DEGREES
EKG VENTRICULAR RATE: 94 BPM
EOSINOPHIL # BLD: 0.24 K/UL (ref 0.05–0.5)
EOSINOPHILS RELATIVE PERCENT: 2 % (ref 0–6)
ERYTHROCYTE [DISTWIDTH] IN BLOOD BY AUTOMATED COUNT: 14.9 % (ref 11.5–15)
GFR, ESTIMATED: 51 ML/MIN/1.73M2
GLUCOSE SERPL-MCNC: 108 MG/DL (ref 74–99)
HCT VFR BLD AUTO: 32.4 % (ref 34–48)
HGB BLD-MCNC: 10.6 G/DL (ref 11.5–15.5)
IMM GRANULOCYTES # BLD AUTO: 0.06 K/UL (ref 0–0.58)
IMM GRANULOCYTES NFR BLD: 1 % (ref 0–5)
LYMPHOCYTES NFR BLD: 1.79 K/UL (ref 1.5–4)
LYMPHOCYTES RELATIVE PERCENT: 17 % (ref 20–42)
MAGNESIUM SERPL-MCNC: 1.8 MG/DL (ref 1.6–2.6)
MCH RBC QN AUTO: 31.7 PG (ref 26–35)
MCHC RBC AUTO-ENTMCNC: 32.7 G/DL (ref 32–34.5)
MCV RBC AUTO: 97 FL (ref 80–99.9)
MONOCYTES NFR BLD: 0.81 K/UL (ref 0.1–0.95)
MONOCYTES NFR BLD: 8 % (ref 2–12)
NEUTROPHILS NFR BLD: 72 % (ref 43–80)
NEUTS SEG NFR BLD: 7.38 K/UL (ref 1.8–7.3)
PLATELET # BLD AUTO: 225 K/UL (ref 130–450)
PMV BLD AUTO: 11.4 FL (ref 7–12)
POTASSIUM SERPL-SCNC: 3.3 MMOL/L (ref 3.5–5)
RBC # BLD AUTO: 3.34 M/UL (ref 3.5–5.5)
SODIUM SERPL-SCNC: 134 MMOL/L (ref 132–146)
WBC OTHER # BLD: 10.3 K/UL (ref 4.5–11.5)

## 2025-02-13 PROCEDURE — 99221 1ST HOSP IP/OBS SF/LOW 40: CPT | Performed by: SURGERY

## 2025-02-13 PROCEDURE — 99223 1ST HOSP IP/OBS HIGH 75: CPT | Performed by: INTERNAL MEDICINE

## 2025-02-13 PROCEDURE — 93010 ELECTROCARDIOGRAM REPORT: CPT | Performed by: INTERNAL MEDICINE

## 2025-02-13 PROCEDURE — 2700000000 HC OXYGEN THERAPY PER DAY

## 2025-02-13 PROCEDURE — 6370000000 HC RX 637 (ALT 250 FOR IP): Performed by: NURSE PRACTITIONER

## 2025-02-13 PROCEDURE — 85025 COMPLETE CBC W/AUTO DIFF WBC: CPT

## 2025-02-13 PROCEDURE — 99233 SBSQ HOSP IP/OBS HIGH 50: CPT | Performed by: INTERNAL MEDICINE

## 2025-02-13 PROCEDURE — 36415 COLL VENOUS BLD VENIPUNCTURE: CPT

## 2025-02-13 PROCEDURE — 6360000002 HC RX W HCPCS: Performed by: NURSE PRACTITIONER

## 2025-02-13 PROCEDURE — 2140000000 HC CCU INTERMEDIATE R&B

## 2025-02-13 PROCEDURE — 83735 ASSAY OF MAGNESIUM: CPT

## 2025-02-13 PROCEDURE — 80048 BASIC METABOLIC PNL TOTAL CA: CPT

## 2025-02-13 PROCEDURE — 6370000000 HC RX 637 (ALT 250 FOR IP)

## 2025-02-13 PROCEDURE — 2500000003 HC RX 250 WO HCPCS: Performed by: NURSE PRACTITIONER

## 2025-02-13 PROCEDURE — 6370000000 HC RX 637 (ALT 250 FOR IP): Performed by: INTERNAL MEDICINE

## 2025-02-13 RX ORDER — BISACODYL 5 MG/1
10 TABLET, DELAYED RELEASE ORAL DAILY
Status: DISCONTINUED | OUTPATIENT
Start: 2025-02-13 | End: 2025-02-20 | Stop reason: HOSPADM

## 2025-02-13 RX ORDER — POLYETHYLENE GLYCOL 3350 17 G/17G
17 POWDER, FOR SOLUTION ORAL DAILY
Status: DISCONTINUED | OUTPATIENT
Start: 2025-02-13 | End: 2025-02-20 | Stop reason: HOSPADM

## 2025-02-13 RX ORDER — POTASSIUM CHLORIDE 1500 MG/1
40 TABLET, EXTENDED RELEASE ORAL ONCE
Status: DISCONTINUED | OUTPATIENT
Start: 2025-02-13 | End: 2025-02-20 | Stop reason: HOSPADM

## 2025-02-13 RX ORDER — MIDODRINE HYDROCHLORIDE 2.5 MG/1
2.5 TABLET ORAL
Status: DISCONTINUED | OUTPATIENT
Start: 2025-02-13 | End: 2025-02-20 | Stop reason: HOSPADM

## 2025-02-13 RX ORDER — SENNOSIDES A AND B 8.6 MG/1
1 TABLET, FILM COATED ORAL NIGHTLY
Status: DISCONTINUED | OUTPATIENT
Start: 2025-02-13 | End: 2025-02-20 | Stop reason: HOSPADM

## 2025-02-13 RX ADMIN — SODIUM CHLORIDE, PRESERVATIVE FREE 10 ML: 5 INJECTION INTRAVENOUS at 09:24

## 2025-02-13 RX ADMIN — GABAPENTIN 300 MG: 300 CAPSULE ORAL at 09:25

## 2025-02-13 RX ADMIN — TRAZODONE HYDROCHLORIDE 25 MG: 50 TABLET ORAL at 09:26

## 2025-02-13 RX ADMIN — ATORVASTATIN CALCIUM 40 MG: 40 TABLET, FILM COATED ORAL at 09:25

## 2025-02-13 RX ADMIN — MIDODRINE HYDROCHLORIDE 2.5 MG: 2.5 TABLET ORAL at 15:10

## 2025-02-13 RX ADMIN — LEVOTHYROXINE SODIUM 125 MCG: 0.05 TABLET ORAL at 09:26

## 2025-02-13 RX ADMIN — ACETAMINOPHEN 650 MG: 325 TABLET ORAL at 06:27

## 2025-02-13 RX ADMIN — CETIRIZINE HYDROCHLORIDE 10 MG: 10 TABLET, FILM COATED ORAL at 09:28

## 2025-02-13 RX ADMIN — ENOXAPARIN SODIUM 30 MG: 100 INJECTION SUBCUTANEOUS at 09:28

## 2025-02-13 RX ADMIN — SODIUM CHLORIDE, PRESERVATIVE FREE 10 ML: 5 INJECTION INTRAVENOUS at 20:46

## 2025-02-13 RX ADMIN — Medication 3 MG: at 20:44

## 2025-02-13 RX ADMIN — BENZONATATE 100 MG: 100 CAPSULE ORAL at 20:45

## 2025-02-13 RX ADMIN — BENZOCAINE AND MENTHOL 1 LOZENGE: 15; 3.6 LOZENGE ORAL at 20:45

## 2025-02-13 RX ADMIN — METOPROLOL SUCCINATE 12.5 MG: 25 TABLET, EXTENDED RELEASE ORAL at 09:24

## 2025-02-13 RX ADMIN — BUPROPION HYDROCHLORIDE 150 MG: 150 TABLET, EXTENDED RELEASE ORAL at 09:25

## 2025-02-13 RX ADMIN — SENNOSIDES 8.6 MG: 8.6 TABLET, COATED ORAL at 20:44

## 2025-02-13 RX ADMIN — GABAPENTIN 300 MG: 300 CAPSULE ORAL at 20:44

## 2025-02-13 RX ADMIN — BISACODYL 10 MG: 5 TABLET, COATED ORAL at 17:34

## 2025-02-13 RX ADMIN — CLOPIDOGREL BISULFATE 75 MG: 75 TABLET ORAL at 09:27

## 2025-02-13 RX ADMIN — ACETAMINOPHEN 650 MG: 325 TABLET ORAL at 20:45

## 2025-02-13 RX ADMIN — PANTOPRAZOLE SODIUM 40 MG: 40 TABLET, DELAYED RELEASE ORAL at 06:24

## 2025-02-13 RX ADMIN — MIRTAZAPINE 30 MG: 15 TABLET, FILM COATED ORAL at 20:44

## 2025-02-13 RX ADMIN — MIDODRINE HYDROCHLORIDE 2.5 MG: 2.5 TABLET ORAL at 17:33

## 2025-02-13 RX ADMIN — FUROSEMIDE 20 MG: 10 INJECTION, SOLUTION INTRAMUSCULAR; INTRAVENOUS at 09:27

## 2025-02-13 RX ADMIN — ONDANSETRON 4 MG: 2 INJECTION, SOLUTION INTRAMUSCULAR; INTRAVENOUS at 06:32

## 2025-02-13 ASSESSMENT — PAIN SCALES - GENERAL
PAINLEVEL_OUTOF10: 7
PAINLEVEL_OUTOF10: 2
PAINLEVEL_OUTOF10: 4

## 2025-02-13 ASSESSMENT — PAIN - FUNCTIONAL ASSESSMENT
PAIN_FUNCTIONAL_ASSESSMENT: PREVENTS OR INTERFERES SOME ACTIVE ACTIVITIES AND ADLS

## 2025-02-13 ASSESSMENT — PAIN DESCRIPTION - PAIN TYPE: TYPE: CHRONIC PAIN

## 2025-02-13 ASSESSMENT — PAIN DESCRIPTION - ORIENTATION
ORIENTATION: RIGHT
ORIENTATION: LEFT
ORIENTATION: LEFT

## 2025-02-13 ASSESSMENT — PAIN DESCRIPTION - LOCATION
LOCATION: BACK;LEG;SHOULDER
LOCATION: BACK;SHOULDER
LOCATION: ARM;SHOULDER

## 2025-02-13 ASSESSMENT — PAIN SCALES - WONG BAKER: WONGBAKER_NUMERICALRESPONSE: NO HURT

## 2025-02-13 ASSESSMENT — PAIN DESCRIPTION - DESCRIPTORS
DESCRIPTORS: ACHING;DISCOMFORT
DESCRIPTORS: ACHING;DISCOMFORT

## 2025-02-13 ASSESSMENT — PAIN DESCRIPTION - FREQUENCY: FREQUENCY: INTERMITTENT

## 2025-02-13 NOTE — ACP (ADVANCE CARE PLANNING)
Advance Care Planning   Healthcare Decision Maker:    Primary Decision Maker: Yoselin Ledesma - Child - [phone]    Primary Decision Maker: Ben Sierra - Child - [phone]    Click here to complete Healthcare Decision Makers including selection of the Healthcare Decision Maker Relationship (ie \"Primary\").  Today we documented Decision Maker(s) consistent with Legal Next of Kin hierarchy.       Electronically signed by Shaye Godwin RN on 2/13/2025 at 11:28 AM

## 2025-02-13 NOTE — PLAN OF CARE
Problem: Chronic Conditions and Co-morbidities  Goal: Patient's chronic conditions and co-morbidity symptoms are monitored and maintained or improved  2/13/2025 1209 by Nabila Hager RN  Outcome: Progressing  Flowsheets (Taken 2/13/2025 0017 by Nikita Whitley RN)  Care Plan - Patient's Chronic Conditions and Co-Morbidity Symptoms are Monitored and Maintained or Improved:   Monitor and assess patient's chronic conditions and comorbid symptoms for stability, deterioration, or improvement   Collaborate with multidisciplinary team to address chronic and comorbid conditions and prevent exacerbation or deterioration   Update acute care plan with appropriate goals if chronic or comorbid symptoms are exacerbated and prevent overall improvement and discharge  2/13/2025 0012 by Nikita Whitley RN  Outcome: Progressing     Problem: Discharge Planning  Goal: Discharge to home or other facility with appropriate resources  2/13/2025 1209 by Nabila Hager RN  Outcome: Progressing  Flowsheets (Taken 2/13/2025 0017 by Nikita Whitley RN)  Discharge to home or other facility with appropriate resources:   Identify barriers to discharge with patient and caregiver   Arrange for needed discharge resources and transportation as appropriate  2/13/2025 0012 by Nikita Whitley RN  Outcome: Progressing     Problem: Pain  Goal: Verbalizes/displays adequate comfort level or baseline comfort level  Recent Flowsheet Documentation  Taken 2/13/2025 0430 by Nikita Whitley RN  Verbalizes/displays adequate comfort level or baseline comfort level: Encourage patient to monitor pain and request assistance  Taken 2/13/2025 0115 by Nikita Whitley RN  Verbalizes/displays adequate comfort level or baseline comfort level:   Encourage patient to monitor pain and request assistance   Assess pain using appropriate pain scale   Administer analgesics based on type and severity of pain and evaluate response  2/13/2025 0012 by Nikita Whitley

## 2025-02-13 NOTE — CARE COORDINATION
2/13:  Transition of care:  Pt presented to the ER for nausea, sob & not feeling well from home.  Pt is on 2L/NC at 93%, Iv Apreslone, Iv Lasix & Sq Lovenox.   CHF Educator, Cardiology & GS consulted.  CM spoke with pt & her daughter Corina lopez to discuss CM role & dc planning.  Pt's PCP Dr Santiago & use [facility].  Pt lives with her daughter in a house with 3 steps to enter.  The bed/bathroom are on the 1st floor.  PTA pt was independent with a walker.  Pt states she has been mobile in the room to/from the bathroom.  Pt has no hx of HHC/SNF.  Pt's dc plan is home & family to dc home.  Pt is open to HHC & has no preferences & declined a list.  Sw/CM will continue to follow.  Electronically signed by Shaye Godwin RN on 2/13/2025 at 11:26 AM    Case Management Assessment  Initial Evaluation    Date/Time of Evaluation: 2/13/2025 11:27 AM  Assessment Completed by: Shaye Godwin RN    If patient is discharged prior to next notation, then this note serves as note for discharge by case management.    Patient Name: Liane Booker                   YOB: 1926  Diagnosis: Acute exacerbation of chronic heart failure (HCC) [I50.9]  Acute decompensated heart failure (HCC) [I50.9]                   Date / Time: 2/12/2025  7:24 AM    Patient Admission Status: Inpatient   Readmission Risk (Low < 19, Mod (19-27), High > 27): Readmission Risk Score: 14.3    Current PCP: Linda Santiago MD  PCP verified by CM? Yes    Chart Reviewed: YES      History Provided by: Patient  Patient Orientation: Alert and Oriented, Person, Place, Situation, Self    Patient Cognition:      Hospitalization in the last 30 days (Readmission):  No    If yes, Readmission Assessment in CM Navigator will be completed.    Advance Directives:      Code Status: Full Code   Patient's Primary Decision Maker is:      Primary Decision Maker: Yoselin Ledesma - [phone]    Primary Decision Maker: Ben Sierra -

## 2025-02-13 NOTE — NURSE NAVIGATOR
Patient's chart updated to reflect:      .    - HF care plan, HF education points and HF discharge instructions.  -Orders: 2 gram sodium diet, daily weights, I/O.  -PCP or cardiology follow up appointments to be scheduled within 7 days of hospital discharge.  -CHF education session will be provided to the patient prior to hospital discharge.     Aparna Benton RN, CHFN   Heart Failure Navigator

## 2025-02-13 NOTE — DISCHARGE INSTRUCTIONS
Activity as tolerated    Be compliant with your medications and take them as prescribed.    Diet: common adult    Special Instructions:   Monitor BP at home at least twice a day.  If BP is less than 90 systolic (top number) and sustaining, do not give home Toprol-XL and Lasix.  Contact cardiology office regarding holding of Plavix prior to TAVR, they did not specify regarding need to hold this prior to procedure.  Aldactone stopped due to low BP  Continue midodrine 3 times daily to offset low BP while on cardiac medications  Continue your other home medications as prescribed, no other changes made      Hospital Follow up: TAVR procedure with cardiology on 3/26      Other than any new prescriptions given to you today, the list of home going meds on this After Visit Summary are based on information provided to us from you. This information, including the list, dose, and frequency of medications is only as accurate as the information you provided. If you have any questions or concerns about your home  medications, please contact your Primary Care Physician for further clarification.    Information obtained by:   By signing below, I understand that if any problems occur once I leave the hospital I am to contact [email or web address]. I understand and acknowledge receipt of the instruction indicated above.                         HEART FAILURE  / CONGESTIVE HEART FAILURE  DISCHARGE INSTRUCTIONS:  GUIDELINES TO FOLLOW AT HOME    Self- Managed Care:     MEDICATIONS:  Take your medication as directed. If you are experiencing any side effects, inform your doctor, Do not stop taking any of your medications without letting your doctor know.   Check with your doctor before taking any over-the-counter medications / herbal / or dietary supplements. They may interfere with your other medications.  Do not take ibuprofen (Advil or Motrin) and naproxen (Aleve) without talking to your doctor first. They could make your heart failure worse.

## 2025-02-14 LAB
ANION GAP SERPL CALCULATED.3IONS-SCNC: 8 MMOL/L (ref 7–16)
BASOPHILS # BLD: 0.05 K/UL (ref 0–0.2)
BASOPHILS NFR BLD: 1 % (ref 0–2)
BNP SERPL-MCNC: ABNORMAL PG/ML (ref 0–450)
BUN SERPL-MCNC: 19 MG/DL (ref 6–23)
CALCIUM SERPL-MCNC: 8.5 MG/DL (ref 8.6–10.2)
CHLORIDE SERPL-SCNC: 95 MMOL/L (ref 98–107)
CO2 SERPL-SCNC: 30 MMOL/L (ref 22–29)
CREAT SERPL-MCNC: 1.1 MG/DL (ref 0.5–1)
EOSINOPHIL # BLD: 0.46 K/UL (ref 0.05–0.5)
EOSINOPHILS RELATIVE PERCENT: 6 % (ref 0–6)
ERYTHROCYTE [DISTWIDTH] IN BLOOD BY AUTOMATED COUNT: 14.9 % (ref 11.5–15)
GFR, ESTIMATED: 48 ML/MIN/1.73M2
GLUCOSE SERPL-MCNC: 95 MG/DL (ref 74–99)
HCT VFR BLD AUTO: 31.2 % (ref 34–48)
HGB BLD-MCNC: 10 G/DL (ref 11.5–15.5)
IMM GRANULOCYTES # BLD AUTO: 0.03 K/UL (ref 0–0.58)
IMM GRANULOCYTES NFR BLD: 0 % (ref 0–5)
LYMPHOCYTES NFR BLD: 1.37 K/UL (ref 1.5–4)
LYMPHOCYTES RELATIVE PERCENT: 18 % (ref 20–42)
MAGNESIUM SERPL-MCNC: 1.7 MG/DL (ref 1.6–2.6)
MCH RBC QN AUTO: 31.7 PG (ref 26–35)
MCHC RBC AUTO-ENTMCNC: 32.1 G/DL (ref 32–34.5)
MCV RBC AUTO: 99 FL (ref 80–99.9)
MONOCYTES NFR BLD: 0.84 K/UL (ref 0.1–0.95)
MONOCYTES NFR BLD: 11 % (ref 2–12)
NEUTROPHILS NFR BLD: 65 % (ref 43–80)
NEUTS SEG NFR BLD: 5.03 K/UL (ref 1.8–7.3)
PLATELET # BLD AUTO: 212 K/UL (ref 130–450)
PMV BLD AUTO: 11.6 FL (ref 7–12)
POTASSIUM SERPL-SCNC: 3.3 MMOL/L (ref 3.5–5)
RBC # BLD AUTO: 3.15 M/UL (ref 3.5–5.5)
SODIUM SERPL-SCNC: 133 MMOL/L (ref 132–146)
WBC OTHER # BLD: 7.8 K/UL (ref 4.5–11.5)

## 2025-02-14 PROCEDURE — 2500000003 HC RX 250 WO HCPCS: Performed by: NURSE PRACTITIONER

## 2025-02-14 PROCEDURE — 36415 COLL VENOUS BLD VENIPUNCTURE: CPT

## 2025-02-14 PROCEDURE — 2140000000 HC CCU INTERMEDIATE R&B

## 2025-02-14 PROCEDURE — 83735 ASSAY OF MAGNESIUM: CPT

## 2025-02-14 PROCEDURE — 85025 COMPLETE CBC W/AUTO DIFF WBC: CPT

## 2025-02-14 PROCEDURE — 80048 BASIC METABOLIC PNL TOTAL CA: CPT

## 2025-02-14 PROCEDURE — 2700000000 HC OXYGEN THERAPY PER DAY

## 2025-02-14 PROCEDURE — 6370000000 HC RX 637 (ALT 250 FOR IP)

## 2025-02-14 PROCEDURE — 6360000002 HC RX W HCPCS: Performed by: NURSE PRACTITIONER

## 2025-02-14 PROCEDURE — 83880 ASSAY OF NATRIURETIC PEPTIDE: CPT

## 2025-02-14 PROCEDURE — 99233 SBSQ HOSP IP/OBS HIGH 50: CPT | Performed by: INTERNAL MEDICINE

## 2025-02-14 PROCEDURE — 6370000000 HC RX 637 (ALT 250 FOR IP): Performed by: NURSE PRACTITIONER

## 2025-02-14 PROCEDURE — 6370000000 HC RX 637 (ALT 250 FOR IP): Performed by: INTERNAL MEDICINE

## 2025-02-14 RX ORDER — POTASSIUM CHLORIDE 1500 MG/1
20 TABLET, EXTENDED RELEASE ORAL
Status: DISCONTINUED | OUTPATIENT
Start: 2025-02-15 | End: 2025-02-20 | Stop reason: HOSPADM

## 2025-02-14 RX ORDER — POTASSIUM CHLORIDE 1500 MG/1
40 TABLET, EXTENDED RELEASE ORAL ONCE
Status: COMPLETED | OUTPATIENT
Start: 2025-02-14 | End: 2025-02-14

## 2025-02-14 RX ADMIN — ENOXAPARIN SODIUM 30 MG: 100 INJECTION SUBCUTANEOUS at 09:43

## 2025-02-14 RX ADMIN — ATORVASTATIN CALCIUM 40 MG: 40 TABLET, FILM COATED ORAL at 10:02

## 2025-02-14 RX ADMIN — LEVOTHYROXINE SODIUM 125 MCG: 0.05 TABLET ORAL at 09:41

## 2025-02-14 RX ADMIN — MIDODRINE HYDROCHLORIDE 2.5 MG: 2.5 TABLET ORAL at 08:13

## 2025-02-14 RX ADMIN — ACETAMINOPHEN 650 MG: 325 TABLET ORAL at 04:58

## 2025-02-14 RX ADMIN — MIRTAZAPINE 30 MG: 15 TABLET, FILM COATED ORAL at 20:59

## 2025-02-14 RX ADMIN — SENNOSIDES 8.6 MG: 8.6 TABLET, COATED ORAL at 20:59

## 2025-02-14 RX ADMIN — POLYETHYLENE GLYCOL 3350 17 G: 17 POWDER, FOR SOLUTION ORAL at 18:25

## 2025-02-14 RX ADMIN — BISACODYL 10 MG: 5 TABLET, COATED ORAL at 09:42

## 2025-02-14 RX ADMIN — TRAZODONE HYDROCHLORIDE 25 MG: 50 TABLET ORAL at 10:02

## 2025-02-14 RX ADMIN — GABAPENTIN 300 MG: 300 CAPSULE ORAL at 09:41

## 2025-02-14 RX ADMIN — METOPROLOL SUCCINATE 12.5 MG: 25 TABLET, EXTENDED RELEASE ORAL at 09:46

## 2025-02-14 RX ADMIN — DICLOFENAC SODIUM 2 G: 10 GEL TOPICAL at 06:33

## 2025-02-14 RX ADMIN — MIDODRINE HYDROCHLORIDE 2.5 MG: 2.5 TABLET ORAL at 12:46

## 2025-02-14 RX ADMIN — CLOPIDOGREL BISULFATE 75 MG: 75 TABLET ORAL at 09:43

## 2025-02-14 RX ADMIN — POTASSIUM CHLORIDE 40 MEQ: 1500 TABLET, EXTENDED RELEASE ORAL at 10:01

## 2025-02-14 RX ADMIN — PANTOPRAZOLE SODIUM 40 MG: 40 TABLET, DELAYED RELEASE ORAL at 04:58

## 2025-02-14 RX ADMIN — BUPROPION HYDROCHLORIDE 150 MG: 150 TABLET, EXTENDED RELEASE ORAL at 09:45

## 2025-02-14 RX ADMIN — SODIUM CHLORIDE, PRESERVATIVE FREE 10 ML: 5 INJECTION INTRAVENOUS at 10:06

## 2025-02-14 RX ADMIN — CLOPIDOGREL BISULFATE 75 MG: 75 TABLET ORAL at 09:52

## 2025-02-14 RX ADMIN — FUROSEMIDE 20 MG: 10 INJECTION, SOLUTION INTRAMUSCULAR; INTRAVENOUS at 09:43

## 2025-02-14 RX ADMIN — CETIRIZINE HYDROCHLORIDE 10 MG: 10 TABLET, FILM COATED ORAL at 09:42

## 2025-02-14 RX ADMIN — SODIUM CHLORIDE, PRESERVATIVE FREE 10 ML: 5 INJECTION INTRAVENOUS at 20:59

## 2025-02-14 RX ADMIN — GABAPENTIN 300 MG: 300 CAPSULE ORAL at 20:59

## 2025-02-14 ASSESSMENT — PAIN DESCRIPTION - DESCRIPTORS: DESCRIPTORS: ACHING;DISCOMFORT

## 2025-02-14 ASSESSMENT — PAIN - FUNCTIONAL ASSESSMENT
PAIN_FUNCTIONAL_ASSESSMENT: PREVENTS OR INTERFERES SOME ACTIVE ACTIVITIES AND ADLS
PAIN_FUNCTIONAL_ASSESSMENT: PREVENTS OR INTERFERES SOME ACTIVE ACTIVITIES AND ADLS

## 2025-02-14 ASSESSMENT — PAIN SCALES - WONG BAKER
WONGBAKER_NUMERICALRESPONSE: NO HURT

## 2025-02-14 ASSESSMENT — PAIN SCALES - GENERAL
PAINLEVEL_OUTOF10: 7
PAINLEVEL_OUTOF10: 0
PAINLEVEL_OUTOF10: 0
PAINLEVEL_OUTOF10: 5

## 2025-02-14 ASSESSMENT — PAIN DESCRIPTION - LOCATION
LOCATION: SHOULDER
LOCATION: BACK;SHOULDER

## 2025-02-14 ASSESSMENT — PAIN DESCRIPTION - ORIENTATION
ORIENTATION: RIGHT;LEFT
ORIENTATION: RIGHT

## 2025-02-14 NOTE — CARE COORDINATION
2/14:  Update CM Note:  Pt presented to the ER for nausea, sob & not feeling well from home. Pt is on 2L/NC at 95%, Iv Apreslone, Iv Lasix & Sq Lovenox. CHF Educator, Cardiology & GS consulted. Pt's dc plan is home with family to transport.  Pt is open to [facility] & has no preferences.  CM sent referral to [facility] & they can accept.   CM placed order just will need to call when dc.  Sw/CM will continue to follow.  Electronically signed by Shaye Godwin RN on 2/14/2025 at 12:27 PM

## 2025-02-14 NOTE — PLAN OF CARE
Problem: Chronic Conditions and Co-morbidities  Goal: Patient's chronic conditions and co-morbidity symptoms are monitored and maintained or improved  2/13/2025 2336 by Makayla Maldonado RN  Outcome: Progressing  Flowsheets (Taken 2/13/2025 1950)  Care Plan - Patient's Chronic Conditions and Co-Morbidity Symptoms are Monitored and Maintained or Improved: Monitor and assess patient's chronic conditions and comorbid symptoms for stability, deterioration, or improvement  2/13/2025 1209 by Nabila Hager RN  Outcome: Progressing  Flowsheets  Taken 2/13/2025 0828 by Nabila Hager RN  Care Plan - Patient's Chronic Conditions and Co-Morbidity Symptoms are Monitored and Maintained or Improved:   Monitor and assess patient's chronic conditions and comorbid symptoms for stability, deterioration, or improvement   Collaborate with multidisciplinary team to address chronic and comorbid conditions and prevent exacerbation or deterioration   Update acute care plan with appropriate goals if chronic or comorbid symptoms are exacerbated and prevent overall improvement and discharge  Taken 2/13/2025 0017 by Nikita Whitley RN  Care Plan - Patient's Chronic Conditions and Co-Morbidity Symptoms are Monitored and Maintained or Improved:   Monitor and assess patient's chronic conditions and comorbid symptoms for stability, deterioration, or improvement   Collaborate with multidisciplinary team to address chronic and comorbid conditions and prevent exacerbation or deterioration   Update acute care plan with appropriate goals if chronic or comorbid symptoms are exacerbated and prevent overall improvement and discharge

## 2025-02-15 ENCOUNTER — APPOINTMENT (OUTPATIENT)
Age: 89
DRG: 291 | End: 2025-02-15
Payer: MEDICARE

## 2025-02-15 ENCOUNTER — APPOINTMENT (OUTPATIENT)
Dept: GENERAL RADIOLOGY | Age: 89
DRG: 291 | End: 2025-02-15
Payer: MEDICARE

## 2025-02-15 LAB
ANION GAP SERPL CALCULATED.3IONS-SCNC: 10 MMOL/L (ref 7–16)
BASOPHILS # BLD: 0.03 K/UL (ref 0–0.2)
BASOPHILS NFR BLD: 0 % (ref 0–2)
BUN SERPL-MCNC: 16 MG/DL (ref 6–23)
CALCIUM SERPL-MCNC: 9 MG/DL (ref 8.6–10.2)
CHLORIDE SERPL-SCNC: 94 MMOL/L (ref 98–107)
CO2 SERPL-SCNC: 31 MMOL/L (ref 22–29)
CREAT SERPL-MCNC: 1 MG/DL (ref 0.5–1)
ECHO AO ASC DIAM: 2.6 CM
ECHO AO ASCENDING AORTA INDEX: 1.54 CM/M2
ECHO AR MAX VEL PISA: 3.6 M/S
ECHO AV AREA PEAK VELOCITY: 0.5 CM2
ECHO AV AREA VTI: 0.5 CM2
ECHO AV AREA/BSA PEAK VELOCITY: 0.3 CM2/M2
ECHO AV AREA/BSA VTI: 0.3 CM2/M2
ECHO AV CUSP MM: 0.7 CM
ECHO AV MEAN GRADIENT: 41 MMHG
ECHO AV MEAN VELOCITY: 3 M/S
ECHO AV PEAK GRADIENT: 77 MMHG
ECHO AV PEAK VELOCITY: 4.4 M/S
ECHO AV REGURGITANT PHT: 331.8 MS
ECHO AV VELOCITY RATIO: 0.16
ECHO AV VTI: 95.6 CM
ECHO BSA: 1.72 M2
ECHO EST RA PRESSURE: 3 MMHG
ECHO IVC PROX: 1.7 CM
ECHO LA DIAMETER INDEX: 2.25 CM/M2
ECHO LA DIAMETER: 3.8 CM
ECHO LA VOL A-L A2C: 62 ML (ref 22–52)
ECHO LA VOL A-L A4C: 75 ML (ref 22–52)
ECHO LA VOL BP: 70 ML (ref 22–52)
ECHO LA VOL MOD A2C: 57 ML (ref 22–52)
ECHO LA VOL MOD A4C: 73 ML (ref 22–52)
ECHO LA VOL/BSA BIPLANE: 41 ML/M2 (ref 16–34)
ECHO LA VOLUME AREA LENGTH: 74 ML
ECHO LA VOLUME INDEX A-L A2C: 37 ML/M2 (ref 16–34)
ECHO LA VOLUME INDEX A-L A4C: 44 ML/M2 (ref 16–34)
ECHO LA VOLUME INDEX AREA LENGTH: 44 ML/M2 (ref 16–34)
ECHO LA VOLUME INDEX MOD A2C: 34 ML/M2 (ref 16–34)
ECHO LA VOLUME INDEX MOD A4C: 43 ML/M2 (ref 16–34)
ECHO LV E' LATERAL VELOCITY: 0.05 CM/S
ECHO LV E' SEPTAL VELOCITY: 0.04 CM/S
ECHO LV EDV A2C: 81 ML
ECHO LV EDV A4C: 91 ML
ECHO LV EDV BP: 86 ML (ref 56–104)
ECHO LV EDV INDEX A4C: 54 ML/M2
ECHO LV EDV INDEX BP: 51 ML/M2
ECHO LV EDV NDEX A2C: 48 ML/M2
ECHO LV EJECTION FRACTION A2C: 27 %
ECHO LV EJECTION FRACTION A4C: 41 %
ECHO LV EJECTION FRACTION BIPLANE: 34 % (ref 55–100)
ECHO LV ESV A2C: 59 ML
ECHO LV ESV A4C: 54 ML
ECHO LV ESV BP: 57 ML (ref 19–49)
ECHO LV ESV INDEX A2C: 35 ML/M2
ECHO LV ESV INDEX A4C: 32 ML/M2
ECHO LV ESV INDEX BP: 34 ML/M2
ECHO LV FRACTIONAL SHORTENING: 29 % (ref 28–44)
ECHO LV INTERNAL DIMENSION DIASTOLE INDEX: 2.66 CM/M2
ECHO LV INTERNAL DIMENSION DIASTOLIC: 4.5 CM (ref 3.9–5.3)
ECHO LV INTERNAL DIMENSION SYSTOLIC INDEX: 1.89 CM/M2
ECHO LV INTERNAL DIMENSION SYSTOLIC: 3.2 CM
ECHO LV ISOVOLUMETRIC RELAXATION TIME (IVRT): 30.5 MS
ECHO LV IVSD: 1.2 CM (ref 0.6–0.9)
ECHO LV IVSS: 1.3 CM
ECHO LV MASS 2D: 175 G (ref 67–162)
ECHO LV MASS INDEX 2D: 103.6 G/M2 (ref 43–95)
ECHO LV POSTERIOR WALL DIASTOLIC: 1 CM (ref 0.6–0.9)
ECHO LV POSTERIOR WALL SYSTOLIC: 1.6 CM
ECHO LV RELATIVE WALL THICKNESS RATIO: 0.44
ECHO LVOT AREA: 3.1 CM2
ECHO LVOT AV VTI INDEX: 0.17
ECHO LVOT DIAM: 2 CM
ECHO LVOT MEAN GRADIENT: 1 MMHG
ECHO LVOT PEAK GRADIENT: 2 MMHG
ECHO LVOT PEAK VELOCITY: 0.7 M/S
ECHO LVOT STROKE VOLUME INDEX: 29.4 ML/M2
ECHO LVOT SV: 49.6 ML
ECHO LVOT VTI: 15.8 CM
ECHO MV "A" WAVE DURATION: 129.4 MSEC
ECHO MV A VELOCITY: 1.05 M/S
ECHO MV AREA PHT: 4.5 CM2
ECHO MV AREA VTI: 1.4 CM2
ECHO MV E DECELERATION TIME (DT): 236.4 MS
ECHO MV E VELOCITY: 1.57 M/S
ECHO MV E/A RATIO: 1.5
ECHO MV E/E' LATERAL: 3140
ECHO MV E/E' RATIO (AVERAGED): 3532.5
ECHO MV E/E' SEPTAL: 3925
ECHO MV EROA PISA: 0.2 CM2
ECHO MV LVOT VTI INDEX: 2.25
ECHO MV MAX VELOCITY: 1.7 M/S
ECHO MV MEAN GRADIENT: 4 MMHG
ECHO MV MEAN VELOCITY: 1 M/S
ECHO MV PEAK GRADIENT: 11 MMHG
ECHO MV PRESSURE HALF TIME (PHT): 49.2 MS
ECHO MV REGURGITANT ALIASING (NYQUIST) VELOCITY: 36 CM/S
ECHO MV REGURGITANT RADIUS PISA: 0.65 CM
ECHO MV REGURGITANT VELOCITY PISA: 5.6 M/S
ECHO MV REGURGITANT VOLUME PISA: 27.58 ML
ECHO MV REGURGITANT VTIA: 161.7 CM
ECHO MV VTI: 35.5 CM
ECHO PV MAX VELOCITY: 2.8 M/S
ECHO PV MEAN GRADIENT: 20 MMHG
ECHO PV MEAN VELOCITY: 2.1 M/S
ECHO PV PEAK GRADIENT: 31 MMHG
ECHO PV VTI: 58.8 CM
ECHO PVEIN A DURATION: 137 MS
ECHO PVEIN A VELOCITY: 0.3 M/S
ECHO PVEIN PEAK D VELOCITY: 0.8 M/S
ECHO PVEIN PEAK S VELOCITY: 0.3 M/S
ECHO PVEIN S/D RATIO: 0.4
ECHO RIGHT VENTRICULAR SYSTOLIC PRESSURE (RVSP): 57 MMHG
ECHO RV BASAL DIMENSION: 3.7 CM
ECHO RV INTERNAL DIMENSION: 3.4 CM
ECHO RV LONGITUDINAL DIMENSION: 6.7 CM
ECHO RV MID DIMENSION: 2.4 CM
ECHO RV TAPSE: 1.7 CM (ref 1.7–?)
ECHO TV REGURGITANT MAX VELOCITY: 3.66 M/S
ECHO TV REGURGITANT PEAK GRADIENT: 53 MMHG
EOSINOPHIL # BLD: 0.29 K/UL (ref 0.05–0.5)
EOSINOPHILS RELATIVE PERCENT: 3 % (ref 0–6)
ERYTHROCYTE [DISTWIDTH] IN BLOOD BY AUTOMATED COUNT: 14.6 % (ref 11.5–15)
FLUAV RNA RESP QL NAA+PROBE: NOT DETECTED
FLUBV RNA RESP QL NAA+PROBE: NOT DETECTED
GFR, ESTIMATED: 52 ML/MIN/1.73M2
GLUCOSE SERPL-MCNC: 111 MG/DL (ref 74–99)
HCT VFR BLD AUTO: 33 % (ref 34–48)
HGB BLD-MCNC: 10.4 G/DL (ref 11.5–15.5)
IMM GRANULOCYTES # BLD AUTO: 0.03 K/UL (ref 0–0.58)
IMM GRANULOCYTES NFR BLD: 0 % (ref 0–5)
LYMPHOCYTES NFR BLD: 1.02 K/UL (ref 1.5–4)
LYMPHOCYTES RELATIVE PERCENT: 12 % (ref 20–42)
MCH RBC QN AUTO: 31.1 PG (ref 26–35)
MCHC RBC AUTO-ENTMCNC: 31.5 G/DL (ref 32–34.5)
MCV RBC AUTO: 98.8 FL (ref 80–99.9)
MONOCYTES NFR BLD: 0.84 K/UL (ref 0.1–0.95)
MONOCYTES NFR BLD: 10 % (ref 2–12)
NEUTROPHILS NFR BLD: 74 % (ref 43–80)
NEUTS SEG NFR BLD: 6.25 K/UL (ref 1.8–7.3)
PLATELET # BLD AUTO: 207 K/UL (ref 130–450)
PMV BLD AUTO: 11.3 FL (ref 7–12)
POTASSIUM SERPL-SCNC: 4 MMOL/L (ref 3.5–5)
RBC # BLD AUTO: 3.34 M/UL (ref 3.5–5.5)
SARS-COV-2 RNA RESP QL NAA+PROBE: NOT DETECTED
SODIUM SERPL-SCNC: 135 MMOL/L (ref 132–146)
SOURCE: NORMAL
SPECIMEN DESCRIPTION: NORMAL
WBC OTHER # BLD: 8.5 K/UL (ref 4.5–11.5)

## 2025-02-15 PROCEDURE — 87636 SARSCOV2 & INF A&B AMP PRB: CPT

## 2025-02-15 PROCEDURE — 93306 TTE W/DOPPLER COMPLETE: CPT | Performed by: INTERNAL MEDICINE

## 2025-02-15 PROCEDURE — 6370000000 HC RX 637 (ALT 250 FOR IP): Performed by: INTERNAL MEDICINE

## 2025-02-15 PROCEDURE — 6370000000 HC RX 637 (ALT 250 FOR IP)

## 2025-02-15 PROCEDURE — 94640 AIRWAY INHALATION TREATMENT: CPT

## 2025-02-15 PROCEDURE — 71045 X-RAY EXAM CHEST 1 VIEW: CPT

## 2025-02-15 PROCEDURE — 99233 SBSQ HOSP IP/OBS HIGH 50: CPT | Performed by: INTERNAL MEDICINE

## 2025-02-15 PROCEDURE — 6360000002 HC RX W HCPCS: Performed by: NURSE PRACTITIONER

## 2025-02-15 PROCEDURE — 6370000000 HC RX 637 (ALT 250 FOR IP): Performed by: NURSE PRACTITIONER

## 2025-02-15 PROCEDURE — 94664 DEMO&/EVAL PT USE INHALER: CPT

## 2025-02-15 PROCEDURE — 93306 TTE W/DOPPLER COMPLETE: CPT

## 2025-02-15 PROCEDURE — 2500000003 HC RX 250 WO HCPCS: Performed by: INTERNAL MEDICINE

## 2025-02-15 PROCEDURE — 80048 BASIC METABOLIC PNL TOTAL CA: CPT

## 2025-02-15 PROCEDURE — 85025 COMPLETE CBC W/AUTO DIFF WBC: CPT

## 2025-02-15 PROCEDURE — 2140000000 HC CCU INTERMEDIATE R&B

## 2025-02-15 PROCEDURE — 2700000000 HC OXYGEN THERAPY PER DAY

## 2025-02-15 PROCEDURE — 6360000002 HC RX W HCPCS: Performed by: INTERNAL MEDICINE

## 2025-02-15 PROCEDURE — 2500000003 HC RX 250 WO HCPCS: Performed by: NURSE PRACTITIONER

## 2025-02-15 PROCEDURE — 36415 COLL VENOUS BLD VENIPUNCTURE: CPT

## 2025-02-15 RX ORDER — FUROSEMIDE 10 MG/ML
40 INJECTION INTRAMUSCULAR; INTRAVENOUS DAILY
Status: DISCONTINUED | OUTPATIENT
Start: 2025-02-16 | End: 2025-02-16

## 2025-02-15 RX ORDER — IPRATROPIUM BROMIDE AND ALBUTEROL SULFATE 2.5; .5 MG/3ML; MG/3ML
1 SOLUTION RESPIRATORY (INHALATION)
Status: DISCONTINUED | OUTPATIENT
Start: 2025-02-15 | End: 2025-02-20 | Stop reason: HOSPADM

## 2025-02-15 RX ADMIN — ATORVASTATIN CALCIUM 40 MG: 40 TABLET, FILM COATED ORAL at 09:01

## 2025-02-15 RX ADMIN — SENNOSIDES 8.6 MG: 8.6 TABLET, COATED ORAL at 21:13

## 2025-02-15 RX ADMIN — POTASSIUM CHLORIDE 20 MEQ: 1500 TABLET, EXTENDED RELEASE ORAL at 08:55

## 2025-02-15 RX ADMIN — SODIUM CHLORIDE, PRESERVATIVE FREE 10 ML: 5 INJECTION INTRAVENOUS at 21:25

## 2025-02-15 RX ADMIN — LEVOTHYROXINE SODIUM 125 MCG: 0.05 TABLET ORAL at 09:00

## 2025-02-15 RX ADMIN — BISACODYL 10 MG: 5 TABLET, COATED ORAL at 09:00

## 2025-02-15 RX ADMIN — GABAPENTIN 300 MG: 300 CAPSULE ORAL at 21:13

## 2025-02-15 RX ADMIN — MIDODRINE HYDROCHLORIDE 2.5 MG: 2.5 TABLET ORAL at 11:40

## 2025-02-15 RX ADMIN — METHYLPREDNISOLONE SODIUM SUCCINATE 60 MG: 125 INJECTION, POWDER, LYOPHILIZED, FOR SOLUTION INTRAMUSCULAR; INTRAVENOUS at 21:13

## 2025-02-15 RX ADMIN — CLOPIDOGREL BISULFATE 75 MG: 75 TABLET ORAL at 09:01

## 2025-02-15 RX ADMIN — IPRATROPIUM BROMIDE AND ALBUTEROL SULFATE 1 DOSE: 2.5; .5 SOLUTION RESPIRATORY (INHALATION) at 17:27

## 2025-02-15 RX ADMIN — IPRATROPIUM BROMIDE AND ALBUTEROL SULFATE 1 DOSE: 2.5; .5 SOLUTION RESPIRATORY (INHALATION) at 13:13

## 2025-02-15 RX ADMIN — MIDODRINE HYDROCHLORIDE 2.5 MG: 2.5 TABLET ORAL at 16:29

## 2025-02-15 RX ADMIN — METOPROLOL SUCCINATE 12.5 MG: 25 TABLET, EXTENDED RELEASE ORAL at 09:01

## 2025-02-15 RX ADMIN — PANTOPRAZOLE SODIUM 40 MG: 40 TABLET, DELAYED RELEASE ORAL at 05:34

## 2025-02-15 RX ADMIN — IPRATROPIUM BROMIDE AND ALBUTEROL SULFATE 1 DOSE: 2.5; .5 SOLUTION RESPIRATORY (INHALATION) at 21:36

## 2025-02-15 RX ADMIN — TRAZODONE HYDROCHLORIDE 25 MG: 50 TABLET ORAL at 09:01

## 2025-02-15 RX ADMIN — METHYLPREDNISOLONE SODIUM SUCCINATE 60 MG: 125 INJECTION, POWDER, LYOPHILIZED, FOR SOLUTION INTRAMUSCULAR; INTRAVENOUS at 16:29

## 2025-02-15 RX ADMIN — CETIRIZINE HYDROCHLORIDE 10 MG: 10 TABLET, FILM COATED ORAL at 09:01

## 2025-02-15 RX ADMIN — ENOXAPARIN SODIUM 30 MG: 100 INJECTION SUBCUTANEOUS at 09:02

## 2025-02-15 RX ADMIN — GABAPENTIN 300 MG: 300 CAPSULE ORAL at 09:01

## 2025-02-15 RX ADMIN — MIRTAZAPINE 30 MG: 15 TABLET, FILM COATED ORAL at 21:13

## 2025-02-15 RX ADMIN — MIDODRINE HYDROCHLORIDE 2.5 MG: 2.5 TABLET ORAL at 08:56

## 2025-02-15 RX ADMIN — SODIUM CHLORIDE, PRESERVATIVE FREE 10 ML: 5 INJECTION INTRAVENOUS at 10:52

## 2025-02-15 RX ADMIN — METHYLPREDNISOLONE SODIUM SUCCINATE 60 MG: 125 INJECTION, POWDER, LYOPHILIZED, FOR SOLUTION INTRAMUSCULAR; INTRAVENOUS at 10:34

## 2025-02-15 RX ADMIN — BUPROPION HYDROCHLORIDE 150 MG: 150 TABLET, EXTENDED RELEASE ORAL at 09:00

## 2025-02-15 RX ADMIN — FUROSEMIDE 20 MG: 10 INJECTION, SOLUTION INTRAMUSCULAR; INTRAVENOUS at 09:02

## 2025-02-15 NOTE — PLAN OF CARE
Problem: Chronic Conditions and Co-morbidities  Goal: Patient's chronic conditions and co-morbidity symptoms are monitored and maintained or improved  Outcome: Progressing  Flowsheets (Taken 2/15/2025 3143)  Care Plan - Patient's Chronic Conditions and Co-Morbidity Symptoms are Monitored and Maintained or Improved: Monitor and assess patient's chronic conditions and comorbid symptoms for stability, deterioration, or improvement     Problem: ABCDS Injury Assessment  Goal: Absence of physical injury  Outcome: Progressing     Problem: Safety - Adult  Goal: Free from fall injury  Outcome: Progressing     Problem: Skin/Tissue Integrity  Goal: Skin integrity remains intact  Description: 1.  Monitor for areas of redness and/or skin breakdown  2.  Assess vascular access sites hourly  3.  Every 4-6 hours minimum:  Change oxygen saturation probe site  4.  Every 4-6 hours:  If on nasal continuous positive airway pressure, respiratory therapy assess nares and determine need for appliance change or resting period  Outcome: Progressing

## 2025-02-15 NOTE — PLAN OF CARE
Problem: Chronic Conditions and Co-morbidities  Goal: Patient's chronic conditions and co-morbidity symptoms are monitored and maintained or improved  2/14/2025 2325 by Tran Galicia RN  Outcome: Not Progressing  2/14/2025 1621 by Nabila Hager RN  Outcome: Progressing  Flowsheets (Taken 2/14/2025 0946)  Care Plan - Patient's Chronic Conditions and Co-Morbidity Symptoms are Monitored and Maintained or Improved: Monitor and assess patient's chronic conditions and comorbid symptoms for stability, deterioration, or improvement     Problem: Discharge Planning  Goal: Discharge to home or other facility with appropriate resources  2/14/2025 2325 by Tran Galicia RN  Outcome: Not Progressing  2/14/2025 1621 by Nabila Hager RN  Outcome: Progressing  Flowsheets (Taken 2/14/2025 0946)  Discharge to home or other facility with appropriate resources:   Identify barriers to discharge with patient and caregiver   Refer to discharge planning if patient needs post-hospital services based on physician order or complex needs related to functional status, cognitive ability or social support system     Problem: Pain  Goal: Verbalizes/displays adequate comfort level or baseline comfort level  Outcome: Not Progressing     Problem: ABCDS Injury Assessment  Goal: Absence of physical injury  2/14/2025 2325 by Tran Galicia RN  Outcome: Not Progressing  2/14/2025 1621 by Nabila Hager RN  Outcome: Progressing     Problem: Safety - Adult  Goal: Free from fall injury  Outcome: Not Progressing     Problem: Skin/Tissue Integrity  Goal: Skin integrity remains intact  Description: 1.  Monitor for areas of redness and/or skin breakdown  2.  Assess vascular access sites hourly  3.  Every 4-6 hours minimum:  Change oxygen saturation probe site  4.  Every 4-6 hours:  If on nasal continuous positive airway pressure, respiratory therapy assess nares and determine need for appliance change or resting period  Outcome: Not

## 2025-02-16 ENCOUNTER — APPOINTMENT (OUTPATIENT)
Dept: CT IMAGING | Age: 89
DRG: 291 | End: 2025-02-16
Attending: INTERNAL MEDICINE
Payer: MEDICARE

## 2025-02-16 LAB
ANION GAP SERPL CALCULATED.3IONS-SCNC: 9 MMOL/L (ref 7–16)
ATYPICAL LYMPHOCYTE ABSOLUTE COUNT: 0.15 K/UL (ref 0–0.46)
ATYPICAL LYMPHOCYTES: 2 % (ref 0–4)
BASOPHILS # BLD: 0 K/UL (ref 0–0.2)
BASOPHILS NFR BLD: 0 % (ref 0–2)
BNP SERPL-MCNC: ABNORMAL PG/ML (ref 0–450)
BUN SERPL-MCNC: 23 MG/DL (ref 6–23)
CALCIUM SERPL-MCNC: 8.8 MG/DL (ref 8.6–10.2)
CHLORIDE SERPL-SCNC: 92 MMOL/L (ref 98–107)
CO2 SERPL-SCNC: 30 MMOL/L (ref 22–29)
CREAT SERPL-MCNC: 1.1 MG/DL (ref 0.5–1)
EOSINOPHIL # BLD: 0 K/UL (ref 0.05–0.5)
EOSINOPHILS RELATIVE PERCENT: 0 % (ref 0–6)
ERYTHROCYTE [DISTWIDTH] IN BLOOD BY AUTOMATED COUNT: 14.3 % (ref 11.5–15)
GFR, ESTIMATED: 44 ML/MIN/1.73M2
GLUCOSE SERPL-MCNC: 180 MG/DL (ref 74–99)
HCT VFR BLD AUTO: 31 % (ref 34–48)
HGB BLD-MCNC: 10.1 G/DL (ref 11.5–15.5)
LYMPHOCYTES NFR BLD: 0.3 K/UL (ref 1.5–4)
LYMPHOCYTES RELATIVE PERCENT: 4 % (ref 20–42)
MCH RBC QN AUTO: 31.7 PG (ref 26–35)
MCHC RBC AUTO-ENTMCNC: 32.6 G/DL (ref 32–34.5)
MCV RBC AUTO: 97.2 FL (ref 80–99.9)
MONOCYTES NFR BLD: 0.07 K/UL (ref 0.1–0.95)
MONOCYTES NFR BLD: 1 % (ref 2–12)
NEUTROPHILS NFR BLD: 94 % (ref 43–80)
NEUTS SEG NFR BLD: 8.08 K/UL (ref 1.8–7.3)
PLATELET # BLD AUTO: 208 K/UL (ref 130–450)
PMV BLD AUTO: 11.3 FL (ref 7–12)
POTASSIUM SERPL-SCNC: 4.7 MMOL/L (ref 3.5–5)
RBC # BLD AUTO: 3.19 M/UL (ref 3.5–5.5)
RBC # BLD: ABNORMAL 10*6/UL
SODIUM SERPL-SCNC: 131 MMOL/L (ref 132–146)
WBC OTHER # BLD: 8.6 K/UL (ref 4.5–11.5)

## 2025-02-16 PROCEDURE — 6370000000 HC RX 637 (ALT 250 FOR IP)

## 2025-02-16 PROCEDURE — 6370000000 HC RX 637 (ALT 250 FOR IP): Performed by: NURSE PRACTITIONER

## 2025-02-16 PROCEDURE — 2500000003 HC RX 250 WO HCPCS: Performed by: INTERNAL MEDICINE

## 2025-02-16 PROCEDURE — 80048 BASIC METABOLIC PNL TOTAL CA: CPT

## 2025-02-16 PROCEDURE — 36415 COLL VENOUS BLD VENIPUNCTURE: CPT

## 2025-02-16 PROCEDURE — 75572 CT HRT W/3D IMAGE: CPT

## 2025-02-16 PROCEDURE — 6370000000 HC RX 637 (ALT 250 FOR IP): Performed by: INTERNAL MEDICINE

## 2025-02-16 PROCEDURE — 99222 1ST HOSP IP/OBS MODERATE 55: CPT | Performed by: THORACIC SURGERY (CARDIOTHORACIC VASCULAR SURGERY)

## 2025-02-16 PROCEDURE — 99233 SBSQ HOSP IP/OBS HIGH 50: CPT | Performed by: INTERNAL MEDICINE

## 2025-02-16 PROCEDURE — 2500000003 HC RX 250 WO HCPCS: Performed by: NURSE PRACTITIONER

## 2025-02-16 PROCEDURE — 6360000002 HC RX W HCPCS: Performed by: NURSE PRACTITIONER

## 2025-02-16 PROCEDURE — 2700000000 HC OXYGEN THERAPY PER DAY

## 2025-02-16 PROCEDURE — 6360000004 HC RX CONTRAST MEDICATION: Performed by: RADIOLOGY

## 2025-02-16 PROCEDURE — 83880 ASSAY OF NATRIURETIC PEPTIDE: CPT

## 2025-02-16 PROCEDURE — 71275 CT ANGIOGRAPHY CHEST: CPT

## 2025-02-16 PROCEDURE — 2140000000 HC CCU INTERMEDIATE R&B

## 2025-02-16 PROCEDURE — 74174 CTA ABD&PLVS W/CONTRAST: CPT

## 2025-02-16 PROCEDURE — 6360000002 HC RX W HCPCS: Performed by: INTERNAL MEDICINE

## 2025-02-16 PROCEDURE — 85025 COMPLETE CBC W/AUTO DIFF WBC: CPT

## 2025-02-16 PROCEDURE — 94640 AIRWAY INHALATION TREATMENT: CPT

## 2025-02-16 RX ORDER — OXYCODONE AND ACETAMINOPHEN 5; 325 MG/1; MG/1
1 TABLET ORAL EVERY 4 HOURS PRN
Status: DISCONTINUED | OUTPATIENT
Start: 2025-02-16 | End: 2025-02-20 | Stop reason: HOSPADM

## 2025-02-16 RX ORDER — FUROSEMIDE 10 MG/ML
40 INJECTION INTRAMUSCULAR; INTRAVENOUS 2 TIMES DAILY
Status: DISCONTINUED | OUTPATIENT
Start: 2025-02-17 | End: 2025-02-19

## 2025-02-16 RX ORDER — IOPAMIDOL 755 MG/ML
100 INJECTION, SOLUTION INTRAVASCULAR
Status: COMPLETED | OUTPATIENT
Start: 2025-02-16 | End: 2025-02-16

## 2025-02-16 RX ADMIN — METHYLPREDNISOLONE SODIUM SUCCINATE 60 MG: 125 INJECTION, POWDER, LYOPHILIZED, FOR SOLUTION INTRAMUSCULAR; INTRAVENOUS at 21:08

## 2025-02-16 RX ADMIN — IPRATROPIUM BROMIDE AND ALBUTEROL SULFATE 1 DOSE: 2.5; .5 SOLUTION RESPIRATORY (INHALATION) at 16:13

## 2025-02-16 RX ADMIN — METHYLPREDNISOLONE SODIUM SUCCINATE 60 MG: 125 INJECTION, POWDER, LYOPHILIZED, FOR SOLUTION INTRAMUSCULAR; INTRAVENOUS at 09:53

## 2025-02-16 RX ADMIN — GABAPENTIN 300 MG: 300 CAPSULE ORAL at 21:07

## 2025-02-16 RX ADMIN — SODIUM CHLORIDE, PRESERVATIVE FREE 10 ML: 5 INJECTION INTRAVENOUS at 17:20

## 2025-02-16 RX ADMIN — MIDODRINE HYDROCHLORIDE 2.5 MG: 2.5 TABLET ORAL at 17:19

## 2025-02-16 RX ADMIN — GABAPENTIN 300 MG: 300 CAPSULE ORAL at 09:55

## 2025-02-16 RX ADMIN — IOPAMIDOL 100 ML: 755 INJECTION, SOLUTION INTRAVENOUS at 08:41

## 2025-02-16 RX ADMIN — MIDODRINE HYDROCHLORIDE 2.5 MG: 2.5 TABLET ORAL at 12:25

## 2025-02-16 RX ADMIN — CLOPIDOGREL BISULFATE 75 MG: 75 TABLET ORAL at 09:55

## 2025-02-16 RX ADMIN — SENNOSIDES 8.6 MG: 8.6 TABLET, COATED ORAL at 21:08

## 2025-02-16 RX ADMIN — MIDODRINE HYDROCHLORIDE 2.5 MG: 2.5 TABLET ORAL at 09:56

## 2025-02-16 RX ADMIN — ENOXAPARIN SODIUM 30 MG: 100 INJECTION SUBCUTANEOUS at 09:58

## 2025-02-16 RX ADMIN — POLYETHYLENE GLYCOL 3350 17 G: 17 POWDER, FOR SOLUTION ORAL at 09:58

## 2025-02-16 RX ADMIN — TRAZODONE HYDROCHLORIDE 25 MG: 50 TABLET ORAL at 21:07

## 2025-02-16 RX ADMIN — LEVOTHYROXINE SODIUM 125 MCG: 0.05 TABLET ORAL at 09:55

## 2025-02-16 RX ADMIN — METOPROLOL SUCCINATE 12.5 MG: 25 TABLET, EXTENDED RELEASE ORAL at 09:58

## 2025-02-16 RX ADMIN — OXYCODONE HYDROCHLORIDE AND ACETAMINOPHEN 1 TABLET: 5; 325 TABLET ORAL at 22:13

## 2025-02-16 RX ADMIN — FUROSEMIDE 40 MG: 10 INJECTION, SOLUTION INTRAMUSCULAR; INTRAVENOUS at 09:58

## 2025-02-16 RX ADMIN — SODIUM CHLORIDE, PRESERVATIVE FREE 10 ML: 5 INJECTION INTRAVENOUS at 21:13

## 2025-02-16 RX ADMIN — DICLOFENAC SODIUM 2 G: 10 GEL TOPICAL at 12:19

## 2025-02-16 RX ADMIN — SODIUM CHLORIDE, PRESERVATIVE FREE 10 ML: 5 INJECTION INTRAVENOUS at 09:59

## 2025-02-16 RX ADMIN — POTASSIUM CHLORIDE 20 MEQ: 1500 TABLET, EXTENDED RELEASE ORAL at 09:58

## 2025-02-16 RX ADMIN — MIRTAZAPINE 30 MG: 15 TABLET, FILM COATED ORAL at 21:07

## 2025-02-16 RX ADMIN — BUPROPION HYDROCHLORIDE 150 MG: 150 TABLET, EXTENDED RELEASE ORAL at 10:02

## 2025-02-16 RX ADMIN — ATORVASTATIN CALCIUM 40 MG: 40 TABLET, FILM COATED ORAL at 09:56

## 2025-02-16 RX ADMIN — BISACODYL 10 MG: 5 TABLET, COATED ORAL at 09:56

## 2025-02-16 RX ADMIN — METHYLPREDNISOLONE SODIUM SUCCINATE 60 MG: 125 INJECTION, POWDER, LYOPHILIZED, FOR SOLUTION INTRAMUSCULAR; INTRAVENOUS at 04:58

## 2025-02-16 RX ADMIN — ACETAMINOPHEN 650 MG: 325 TABLET ORAL at 15:33

## 2025-02-16 RX ADMIN — IPRATROPIUM BROMIDE AND ALBUTEROL SULFATE 1 DOSE: 2.5; .5 SOLUTION RESPIRATORY (INHALATION) at 20:01

## 2025-02-16 RX ADMIN — METHYLPREDNISOLONE SODIUM SUCCINATE 60 MG: 125 INJECTION, POWDER, LYOPHILIZED, FOR SOLUTION INTRAMUSCULAR; INTRAVENOUS at 17:19

## 2025-02-16 RX ADMIN — Medication 3 MG: at 22:14

## 2025-02-16 ASSESSMENT — PAIN DESCRIPTION - DESCRIPTORS
DESCRIPTORS: ACHING;DISCOMFORT;SORE
DESCRIPTORS: SORE;SHARP;SHOOTING
DESCRIPTORS: ACHING

## 2025-02-16 ASSESSMENT — PAIN DESCRIPTION - PAIN TYPE
TYPE: CHRONIC PAIN
TYPE: CHRONIC PAIN

## 2025-02-16 ASSESSMENT — PAIN SCALES - GENERAL
PAINLEVEL_OUTOF10: 8
PAINLEVEL_OUTOF10: 4
PAINLEVEL_OUTOF10: 0
PAINLEVEL_OUTOF10: 9

## 2025-02-16 ASSESSMENT — PAIN DESCRIPTION - LOCATION
LOCATION: GENERALIZED
LOCATION: BACK
LOCATION: SHOULDER

## 2025-02-16 ASSESSMENT — PAIN DESCRIPTION - ONSET
ONSET: ON-GOING
ONSET: ON-GOING

## 2025-02-16 ASSESSMENT — PAIN DESCRIPTION - FREQUENCY
FREQUENCY: INTERMITTENT
FREQUENCY: INTERMITTENT

## 2025-02-16 ASSESSMENT — PAIN - FUNCTIONAL ASSESSMENT: PAIN_FUNCTIONAL_ASSESSMENT: ACTIVITIES ARE NOT PREVENTED

## 2025-02-16 ASSESSMENT — PAIN DESCRIPTION - ORIENTATION
ORIENTATION: RIGHT;LEFT
ORIENTATION: RIGHT;LEFT;UPPER;LOWER

## 2025-02-16 NOTE — H&P (VIEW-ONLY)
at 02/15/25 0901    metoprolol succinate (TOPROL XL) extended release tablet 12.5 mg  12.5 mg Oral Daily CarrierMarilyn APRN - CNP   12.5 mg at 02/16/25 0958    mirtazapine (REMERON) tablet 30 mg  30 mg Oral Nightly CarrierMarilyn APRN - CNP   30 mg at 02/15/25 2113    pantoprazole (PROTONIX) tablet 40 mg  40 mg Oral QAM AC Carrier, KINZA Sanders - CNP   40 mg at 02/15/25 0534    traZODone (DESYREL) tablet 25 mg  25 mg Oral Daily CarrierMarilyn APRN - CNP   25 mg at 02/15/25 0901    benzocaine-menthol (CEPACOL SORE THROAT) lozenge 1 lozenge  1 lozenge Oral Q2H PRN Shayla Mars, DO   1 lozenge at 02/13/25 2045    benzonatate (TESSALON) capsule 100 mg  100 mg Oral TID PRN Shayla Mars, DO   100 mg at 02/13/25 2045    calcium carbonate (TUMS) chewable tablet 500 mg  500 mg Oral TID PRN Shayla Mars, DO   500 mg at 02/12/25 2159    hydrALAZINE (APRESOLINE) injection 10 mg  10 mg IntraVENous Q6H PRN Shayla Mars, DO        melatonin tablet 3 mg  3 mg Oral Nightly PRN Shayla Mars, DO   3 mg at 02/13/25 2044    Polyvinyl Alcohol-Povidone PF (REFRESH) 1.4-0.6 % ophthalmic solution 1 drop  1 drop Both Eyes PRN Shayla Mars DO        sodium chloride (OCEAN, BABY AYR) 0.65 % nasal spray 1 spray  1 spray Each Nostril PRN Shayla Mars, DO           Past Medical History:  Past Medical History:   Diagnosis Date    Anxiety     Depression     DJD (degenerative joint disease)     Dysphagia 02/20/2014    GERD (gastroesophageal reflux disease) 02/20/2014    Hyperlipidemia     Hypertension     Hypothyroidism     Irritable bowel syndrome with constipation     Low vitamin D level     Osteoarthritis     Valvular heart disease        Past Surgical History:  Past Surgical History:   Procedure Laterality Date    APPENDECTOMY      CARPAL TUNNEL RELEASE Right 10/05/2016    RIGHT INDEX FINGER TRIGGER RELEASE RIGHT THUMB CARPAL METACARPAL ARTHROPLASTY WITH LIGAMENT RECONSTRUCTION

## 2025-02-16 NOTE — CONSULTS
[facility]   Inpatient CHF Nurse Navigator Consult      Cardiologist: Dr. Salgado    Liane Booker is a 89 y.o. (4/15/1926) female with a history of HFmrEF, most recent EF:  Lab Results   Component Value Date    LVEF 45 03/22/2024    LVEFMODE Echo 03/22/2024       Patient was awake and alert, laying in bed during the consultation and is agreeable to heart failure education with daughter Yanira. She was engaged and asked appropriate questions throughout the education session.     Barriers identified during consult contributing to HF Hospitalization:  [] Limited medication adherence   [] Poor health literacy, education regarding HF medications provided   [] Pill box provided to patient  [] Difficulty affording medications  [] Difficulty obtaining/ managing medications  [] Prescription assistance information given      Not weighing themselves daily  [x] Weight log provided for easy monitoring  [] Scale provided     [] Not following low sodium diet  [] Food insecurity   [x] 2 gram sodium diet education provided   [] Low sodium recipes provided  [] Sodium free seasoning provided   [] Low sodium meal delivery options given to patient  [] Dietician consulted     [] Lack of transportation to appointments     [] Depression, given chronic illness  [] Primary team notified     [] Goals of care need addressed  [] Palliative care consulted     [] CHF CHW consulted, to assist with         Chart Reviewed:  Diet: ADULT DIET; Regular; Low Sodium (2 gm)   Daily Weights: Patient Vitals for the past 96 hrs (Last 3 readings):   Weight   02/13/25 0509 66.2 kg (145 lb 15.1 oz)   02/12/25 0722 65.8 kg (145 lb)     I/O:   Intake/Output Summary (Last 24 hours) at 2/13/2025 1552  Last data filed at 2/13/2025 0939  Gross per 24 hour   Intake 430 ml   Output 350 ml   Net 80 ml       [] Nursing staff/manager notified of inaccurate mcqueen weights or I/O        Discharge Plan:  Above identified barriers 
CTS Consult    Patient name: Liane Booker    Reason for consult:  AS, MR    Primary Care Physician: Linda Santiago MD    Date of service: 2/16/2025    Chief Complaint: SOB    HPI:  89-year-old female with a past medical history of anxiety, depression, GERD, HLD, HTN, hypothyroidism, dysphagia, valvular heart disease, and OA who presents to the ED with nausea and a general feeling of unwell. She has a hx of severe AS and had undergone workup for TAVR at another facility.  She and her family ultimately did not go through with TAVR.  She is now agreeable to potentially proceed.  ECHO done here yesterday revealed LFLG severe AS as well as severe MR.     Allergies:   Allergies   Allergen Reactions    Keflex [Cephalexin]     Reglan [Metoclopramide Hcl] Other (See Comments)     Makes me feel \"high\"       Home medications:    Current Facility-Administered Medications   Medication Dose Route Frequency Provider Last Rate Last Admin    methylPREDNISolone sodium succ (SOLU-MEDROL) 60 mg in sterile water 0.96 mL injection  60 mg IntraVENous Q6H Kenyon Rodriguez MD   60 mg at 02/16/25 0953    ipratropium 0.5 mg-albuterol 2.5 mg (DUONEB) nebulizer solution 1 Dose  1 Dose Inhalation Q4H WA RT Kenyon Rodriguez MD   1 Dose at 02/15/25 2136    furosemide (LASIX) injection 40 mg  40 mg IntraVENous Daily Kenyon Rodriguez MD   40 mg at 02/16/25 0958    diclofenac sodium (VOLTAREN) 1 % gel 2 g  2 g Topical 4x Daily PRN Lakshmi Redman APRN - CNP   2 g at 02/14/25 0633    potassium chloride (KLOR-CON M) extended release tablet 20 mEq  20 mEq Oral Daily with breakfast Alvin Bocanegra MD   20 mEq at 02/16/25 0958    potassium chloride (KLOR-CON M) extended release tablet 40 mEq  40 mEq Oral Once Kenyon Rodriguez MD        sulfur hexafluoride microspheres (LUMASON) 60.7-25 MG injection 2 mL  2 mL IntraVENous ONCE PRN Ana Dukesne, APRN - CNP        midodrine (PROAMATINE) tablet 2.5 mg  2.5 mg Oral TID  PantLanny jeff, APRN - CNP   2.5 mg 
MD  PGY-1 General Surgery Resident   
Medications:    Current Facility-Administered Medications: sodium chloride flush 0.9 % injection 5-40 mL, 5-40 mL, IntraVENous, 2 times per day  sodium chloride flush 0.9 % injection 5-40 mL, 5-40 mL, IntraVENous, PRN  0.9 % sodium chloride infusion, , IntraVENous, PRN  enoxaparin Sodium (LOVENOX) injection 30 mg, 30 mg, SubCUTAneous, Daily  ondansetron (ZOFRAN-ODT) disintegrating tablet 4 mg, 4 mg, Oral, Q8H PRN **OR** ondansetron (ZOFRAN) injection 4 mg, 4 mg, IntraVENous, Q6H PRN  polyethylene glycol (GLYCOLAX) packet 17 g, 17 g, Oral, Daily PRN  acetaminophen (TYLENOL) tablet 650 mg, 650 mg, Oral, Q6H PRN **OR** acetaminophen (TYLENOL) suppository 650 mg, 650 mg, Rectal, Q6H PRN  atorvastatin (LIPITOR) tablet 40 mg, 40 mg, Oral, Daily  buPROPion (WELLBUTRIN XL) extended release tablet 150 mg, 150 mg, Oral, Daily  clopidogrel (PLAVIX) tablet 75 mg, 75 mg, Oral, Daily  gabapentin (NEURONTIN) capsule 300 mg, 300 mg, Oral, BID  levothyroxine (SYNTHROID) tablet 125 mcg, 125 mcg, Oral, Daily  cetirizine (ZYRTEC) tablet 10 mg, 10 mg, Oral, Daily  metoprolol succinate (TOPROL XL) extended release tablet 12.5 mg, 12.5 mg, Oral, Daily  mirtazapine (REMERON) tablet 30 mg, 30 mg, Oral, Nightly  pantoprazole (PROTONIX) tablet 40 mg, 40 mg, Oral, QAM AC  traZODone (DESYREL) tablet 25 mg, 25 mg, Oral, Daily  furosemide (LASIX) injection 20 mg, 20 mg, IntraVENous, Daily  benzocaine-menthol (CEPACOL SORE THROAT) lozenge 1 lozenge, 1 lozenge, Oral, Q2H PRN  benzonatate (TESSALON) capsule 100 mg, 100 mg, Oral, TID PRN  calcium carbonate (TUMS) chewable tablet 500 mg, 500 mg, Oral, TID PRN  hydrALAZINE (APRESOLINE) injection 10 mg, 10 mg, IntraVENous, Q6H PRN  melatonin tablet 3 mg, 3 mg, Oral, Nightly PRN  Polyvinyl Alcohol-Povidone PF (REFRESH) 1.4-0.6 % ophthalmic solution 1 drop, 1 drop, Both Eyes, PRN  sodium chloride (OCEAN, BABY AYR) 0.65 % nasal spray 1 spray, 1 spray, Each Nostril, PRN    Allergies:  Keflex [cephalexin]

## 2025-02-16 NOTE — PLAN OF CARE
Problem: Chronic Conditions and Co-morbidities  Goal: Patient's chronic conditions and co-morbidity symptoms are monitored and maintained or improved  2/15/2025 2248 by Elizabeth Mendez RN  Outcome: Progressing  2/15/2025 1418 by Nabila Hager RN  Outcome: Progressing  Flowsheets (Taken 2/15/2025 1409)  Care Plan - Patient's Chronic Conditions and Co-Morbidity Symptoms are Monitored and Maintained or Improved: Monitor and assess patient's chronic conditions and comorbid symptoms for stability, deterioration, or improvement     Problem: Discharge Planning  Goal: Discharge to home or other facility with appropriate resources  Outcome: Progressing  Flowsheets (Taken 2/15/2025 1409 by Nabila Hager, RN)  Discharge to home or other facility with appropriate resources: Identify barriers to discharge with patient and caregiver     Problem: Pain  Goal: Verbalizes/displays adequate comfort level or baseline comfort level  Outcome: Progressing     Problem: ABCDS Injury Assessment  Goal: Absence of physical injury  2/15/2025 2248 by Elizabeth Mendez RN  Outcome: Progressing  2/15/2025 1418 by Nabila Hager RN  Outcome: Progressing     Problem: Safety - Adult  Goal: Free from fall injury  2/15/2025 2248 by Elizabeth Mendez RN  Outcome: Progressing  2/15/2025 1418 by Nabila Hager RN  Outcome: Progressing     Problem: Skin/Tissue Integrity  Goal: Skin integrity remains intact  Description: 1.  Monitor for areas of redness and/or skin breakdown  2.  Assess vascular access sites hourly  3.  Every 4-6 hours minimum:  Change oxygen saturation probe site  4.  Every 4-6 hours:  If on nasal continuous positive airway pressure, respiratory therapy assess nares and determine need for appliance change or resting period  2/15/2025 2248 by Elizabeth Mendez RN  Outcome: Progressing  2/15/2025 1418 by Nabila Hager RN  Outcome: Progressing

## 2025-02-17 ENCOUNTER — APPOINTMENT (OUTPATIENT)
Dept: ULTRASOUND IMAGING | Age: 89
DRG: 291 | End: 2025-02-17
Payer: MEDICARE

## 2025-02-17 ENCOUNTER — PREP FOR PROCEDURE (OUTPATIENT)
Dept: CARDIOLOGY | Age: 89
End: 2025-02-17

## 2025-02-17 ENCOUNTER — APPOINTMENT (OUTPATIENT)
Dept: INTERVENTIONAL RADIOLOGY/VASCULAR | Age: 89
DRG: 291 | End: 2025-02-17
Payer: MEDICARE

## 2025-02-17 ENCOUNTER — APPOINTMENT (OUTPATIENT)
Dept: GENERAL RADIOLOGY | Age: 89
DRG: 291 | End: 2025-02-17
Payer: MEDICARE

## 2025-02-17 DIAGNOSIS — I35.0 NONRHEUMATIC AORTIC VALVE STENOSIS: ICD-10-CM

## 2025-02-17 DIAGNOSIS — I35.0 NODULAR CALCIFIC AORTIC VALVE STENOSIS: ICD-10-CM

## 2025-02-17 DIAGNOSIS — Z95.2 HISTORY OF TRANSCATHETER AORTIC VALVE REPLACEMENT (TAVR): Primary | ICD-10-CM

## 2025-02-17 LAB
ALBUMIN SERPL-MCNC: 3.8 G/DL (ref 3.5–5.2)
ALP SERPL-CCNC: 71 U/L (ref 35–104)
ALT SERPL-CCNC: 68 U/L (ref 0–32)
ANION GAP SERPL CALCULATED.3IONS-SCNC: 12 MMOL/L (ref 7–16)
AST SERPL-CCNC: 63 U/L (ref 0–31)
BASOPHILS # BLD: 0.01 K/UL (ref 0–0.2)
BASOPHILS NFR BLD: 0 % (ref 0–2)
BILIRUB SERPL-MCNC: 0.3 MG/DL (ref 0–1.2)
BUN SERPL-MCNC: 31 MG/DL (ref 6–23)
CALCIUM SERPL-MCNC: 9.3 MG/DL (ref 8.6–10.2)
CHLORIDE SERPL-SCNC: 93 MMOL/L (ref 98–107)
CO2 SERPL-SCNC: 28 MMOL/L (ref 22–29)
CREAT SERPL-MCNC: 1.4 MG/DL (ref 0.5–1)
EOSINOPHIL # BLD: 0 K/UL (ref 0.05–0.5)
EOSINOPHILS RELATIVE PERCENT: 0 % (ref 0–6)
ERYTHROCYTE [DISTWIDTH] IN BLOOD BY AUTOMATED COUNT: 14.3 % (ref 11.5–15)
GFR, ESTIMATED: 35 ML/MIN/1.73M2
GLUCOSE SERPL-MCNC: 160 MG/DL (ref 74–99)
HCT VFR BLD AUTO: 32.7 % (ref 34–48)
HGB BLD-MCNC: 10.6 G/DL (ref 11.5–15.5)
IMM GRANULOCYTES # BLD AUTO: 0.11 K/UL (ref 0–0.58)
IMM GRANULOCYTES NFR BLD: 1 % (ref 0–5)
INR PPP: 1
LYMPHOCYTES NFR BLD: 0.75 K/UL (ref 1.5–4)
LYMPHOCYTES RELATIVE PERCENT: 5 % (ref 20–42)
MCH RBC QN AUTO: 31.7 PG (ref 26–35)
MCHC RBC AUTO-ENTMCNC: 32.4 G/DL (ref 32–34.5)
MCV RBC AUTO: 97.9 FL (ref 80–99.9)
MONOCYTES NFR BLD: 1.15 K/UL (ref 0.1–0.95)
MONOCYTES NFR BLD: 8 % (ref 2–12)
NEUTROPHILS NFR BLD: 86 % (ref 43–80)
NEUTS SEG NFR BLD: 12.68 K/UL (ref 1.8–7.3)
PLATELET # BLD AUTO: 278 K/UL (ref 130–450)
PMV BLD AUTO: 11.2 FL (ref 7–12)
POTASSIUM SERPL-SCNC: 4.9 MMOL/L (ref 3.5–5)
PROT SERPL-MCNC: 6.8 G/DL (ref 6.4–8.3)
PROTHROMBIN TIME: 11.3 SEC (ref 9.3–12.4)
RBC # BLD AUTO: 3.34 M/UL (ref 3.5–5.5)
SODIUM SERPL-SCNC: 133 MMOL/L (ref 132–146)
WBC OTHER # BLD: 14.7 K/UL (ref 4.5–11.5)

## 2025-02-17 PROCEDURE — 6360000002 HC RX W HCPCS: Performed by: INTERNAL MEDICINE

## 2025-02-17 PROCEDURE — 6370000000 HC RX 637 (ALT 250 FOR IP)

## 2025-02-17 PROCEDURE — 0W9B3ZZ DRAINAGE OF LEFT PLEURAL CAVITY, PERCUTANEOUS APPROACH: ICD-10-PCS | Performed by: INTERNAL MEDICINE

## 2025-02-17 PROCEDURE — 6360000002 HC RX W HCPCS: Performed by: NURSE PRACTITIONER

## 2025-02-17 PROCEDURE — 36415 COLL VENOUS BLD VENIPUNCTURE: CPT

## 2025-02-17 PROCEDURE — 2500000003 HC RX 250 WO HCPCS: Performed by: INTERNAL MEDICINE

## 2025-02-17 PROCEDURE — 85025 COMPLETE CBC W/AUTO DIFF WBC: CPT

## 2025-02-17 PROCEDURE — 6370000000 HC RX 637 (ALT 250 FOR IP): Performed by: NURSE PRACTITIONER

## 2025-02-17 PROCEDURE — 6360000002 HC RX W HCPCS: Performed by: PHYSICIAN ASSISTANT

## 2025-02-17 PROCEDURE — 2500000003 HC RX 250 WO HCPCS: Performed by: NURSE PRACTITIONER

## 2025-02-17 PROCEDURE — C1729 CATH, DRAINAGE: HCPCS

## 2025-02-17 PROCEDURE — 6370000000 HC RX 637 (ALT 250 FOR IP): Performed by: INTERNAL MEDICINE

## 2025-02-17 PROCEDURE — 85610 PROTHROMBIN TIME: CPT

## 2025-02-17 PROCEDURE — 94375 RESPIRATORY FLOW VOLUME LOOP: CPT

## 2025-02-17 PROCEDURE — 94729 DIFFUSING CAPACITY: CPT

## 2025-02-17 PROCEDURE — 32555 ASPIRATE PLEURA W/ IMAGING: CPT

## 2025-02-17 PROCEDURE — 94640 AIRWAY INHALATION TREATMENT: CPT

## 2025-02-17 PROCEDURE — 2140000000 HC CCU INTERMEDIATE R&B

## 2025-02-17 PROCEDURE — 80053 COMPREHEN METABOLIC PANEL: CPT

## 2025-02-17 PROCEDURE — 0W993ZZ DRAINAGE OF RIGHT PLEURAL CAVITY, PERCUTANEOUS APPROACH: ICD-10-PCS | Performed by: INTERNAL MEDICINE

## 2025-02-17 PROCEDURE — 71045 X-RAY EXAM CHEST 1 VIEW: CPT

## 2025-02-17 PROCEDURE — 2700000000 HC OXYGEN THERAPY PER DAY

## 2025-02-17 PROCEDURE — 93880 EXTRACRANIAL BILAT STUDY: CPT

## 2025-02-17 RX ORDER — SODIUM CHLORIDE 0.9 % (FLUSH) 0.9 %
5-40 SYRINGE (ML) INJECTION EVERY 12 HOURS SCHEDULED
Status: CANCELLED | OUTPATIENT
Start: 2025-02-17

## 2025-02-17 RX ORDER — SODIUM CHLORIDE 0.9 % (FLUSH) 0.9 %
5-40 SYRINGE (ML) INJECTION PRN
Status: CANCELLED | OUTPATIENT
Start: 2025-02-17

## 2025-02-17 RX ORDER — MUPIROCIN 20 MG/G
OINTMENT TOPICAL ONCE
Status: CANCELLED | OUTPATIENT
Start: 2025-02-17 | End: 2025-02-17

## 2025-02-17 RX ORDER — SODIUM CHLORIDE 9 MG/ML
INJECTION, SOLUTION INTRAVENOUS PRN
Status: CANCELLED | OUTPATIENT
Start: 2025-02-17

## 2025-02-17 RX ORDER — SODIUM CHLORIDE 9 MG/ML
INJECTION, SOLUTION INTRAVENOUS CONTINUOUS
Status: CANCELLED | OUTPATIENT
Start: 2025-02-17

## 2025-02-17 RX ORDER — LIDOCAINE HYDROCHLORIDE 20 MG/ML
INJECTION, SOLUTION INFILTRATION; PERINEURAL PRN
Status: COMPLETED | OUTPATIENT
Start: 2025-02-17 | End: 2025-02-17

## 2025-02-17 RX ADMIN — GABAPENTIN 300 MG: 300 CAPSULE ORAL at 20:24

## 2025-02-17 RX ADMIN — ATORVASTATIN CALCIUM 40 MG: 40 TABLET, FILM COATED ORAL at 09:40

## 2025-02-17 RX ADMIN — POTASSIUM CHLORIDE 20 MEQ: 1500 TABLET, EXTENDED RELEASE ORAL at 09:38

## 2025-02-17 RX ADMIN — OXYCODONE HYDROCHLORIDE AND ACETAMINOPHEN 1 TABLET: 5; 325 TABLET ORAL at 09:57

## 2025-02-17 RX ADMIN — LEVOTHYROXINE SODIUM 125 MCG: 0.05 TABLET ORAL at 09:40

## 2025-02-17 RX ADMIN — SENNOSIDES 8.6 MG: 8.6 TABLET, COATED ORAL at 20:24

## 2025-02-17 RX ADMIN — FUROSEMIDE 40 MG: 10 INJECTION, SOLUTION INTRAMUSCULAR; INTRAVENOUS at 09:41

## 2025-02-17 RX ADMIN — MIRTAZAPINE 30 MG: 15 TABLET, FILM COATED ORAL at 20:24

## 2025-02-17 RX ADMIN — METOPROLOL SUCCINATE 12.5 MG: 25 TABLET, EXTENDED RELEASE ORAL at 09:38

## 2025-02-17 RX ADMIN — MIDODRINE HYDROCHLORIDE 2.5 MG: 2.5 TABLET ORAL at 09:40

## 2025-02-17 RX ADMIN — CETIRIZINE HYDROCHLORIDE 10 MG: 10 TABLET, FILM COATED ORAL at 09:40

## 2025-02-17 RX ADMIN — SODIUM CHLORIDE, PRESERVATIVE FREE 10 ML: 5 INJECTION INTRAVENOUS at 09:41

## 2025-02-17 RX ADMIN — ENOXAPARIN SODIUM 30 MG: 100 INJECTION SUBCUTANEOUS at 15:33

## 2025-02-17 RX ADMIN — IPRATROPIUM BROMIDE AND ALBUTEROL SULFATE 1 DOSE: 2.5; .5 SOLUTION RESPIRATORY (INHALATION) at 21:33

## 2025-02-17 RX ADMIN — LIDOCAINE HYDROCHLORIDE 10 ML: 20 INJECTION, SOLUTION INFILTRATION; PERINEURAL at 14:30

## 2025-02-17 RX ADMIN — GABAPENTIN 300 MG: 300 CAPSULE ORAL at 09:39

## 2025-02-17 RX ADMIN — IPRATROPIUM BROMIDE AND ALBUTEROL SULFATE 1 DOSE: 2.5; .5 SOLUTION RESPIRATORY (INHALATION) at 16:12

## 2025-02-17 RX ADMIN — MIDODRINE HYDROCHLORIDE 2.5 MG: 2.5 TABLET ORAL at 18:25

## 2025-02-17 RX ADMIN — BUPROPION HYDROCHLORIDE 150 MG: 150 TABLET, EXTENDED RELEASE ORAL at 09:40

## 2025-02-17 RX ADMIN — PANTOPRAZOLE SODIUM 40 MG: 40 TABLET, DELAYED RELEASE ORAL at 06:49

## 2025-02-17 RX ADMIN — METHYLPREDNISOLONE SODIUM SUCCINATE 60 MG: 125 INJECTION, POWDER, LYOPHILIZED, FOR SOLUTION INTRAMUSCULAR; INTRAVENOUS at 09:40

## 2025-02-17 RX ADMIN — SODIUM CHLORIDE, PRESERVATIVE FREE 10 ML: 5 INJECTION INTRAVENOUS at 20:25

## 2025-02-17 RX ADMIN — TRAZODONE HYDROCHLORIDE 25 MG: 50 TABLET ORAL at 20:25

## 2025-02-17 RX ADMIN — FUROSEMIDE 40 MG: 10 INJECTION, SOLUTION INTRAMUSCULAR; INTRAVENOUS at 18:25

## 2025-02-17 RX ADMIN — Medication 3 MG: at 20:24

## 2025-02-17 RX ADMIN — BISACODYL 10 MG: 5 TABLET, COATED ORAL at 09:38

## 2025-02-17 RX ADMIN — METHYLPREDNISOLONE SODIUM SUCCINATE 60 MG: 125 INJECTION, POWDER, LYOPHILIZED, FOR SOLUTION INTRAMUSCULAR; INTRAVENOUS at 06:49

## 2025-02-17 RX ADMIN — CLOPIDOGREL BISULFATE 75 MG: 75 TABLET ORAL at 15:33

## 2025-02-17 RX ADMIN — MIDODRINE HYDROCHLORIDE 2.5 MG: 2.5 TABLET ORAL at 15:33

## 2025-02-17 ASSESSMENT — PAIN DESCRIPTION - DESCRIPTORS: DESCRIPTORS: ACHING;DISCOMFORT;NAGGING

## 2025-02-17 ASSESSMENT — PAIN SCALES - GENERAL: PAINLEVEL_OUTOF10: 7

## 2025-02-17 ASSESSMENT — PAIN DESCRIPTION - LOCATION: LOCATION: ARM

## 2025-02-17 ASSESSMENT — PAIN - FUNCTIONAL ASSESSMENT: PAIN_FUNCTIONAL_ASSESSMENT: PREVENTS OR INTERFERES SOME ACTIVE ACTIVITIES AND ADLS

## 2025-02-17 ASSESSMENT — PAIN DESCRIPTION - ORIENTATION: ORIENTATION: RIGHT;LEFT

## 2025-02-17 NOTE — OP NOTE
Operative Note  ____________________________________________________________      IR U/S GUIDED THORACENTESIS  [facility]WE IMCU    Patient Name: Liane Booker   YOB: 1926  Medical Record Number: [id number]  Date of Procedure: 2/17/25  Room/Bed: [facility]/6420A    Preoperative diagnosis: bilateral Pleural Effusion    Postoperative diagnosis: Same    Consent: The daughter was counseled regarding the procedure, it's indications, risks, potential complications and alternatives and any questions were answered. Consent was obtained for image guided left thoracentesis for Pleural effusion.     Procedure:  Image Guided left Thoracentesis.    Performed by: MINNIE Gastelum under on-site supervision by Chon Garcia MD.    Anesthesia: Local    Estimated blood loss: Less than 10 mL    Complications: None    Specimen Obtained: Pleural Fluid    Procedure: Prior to start of procedure, routine scanning of the posterior thorax was performed using real-time ultrasound and revealed sufficient amount of pleural fluid present. Decision was made to proceed with procedure.  After obtaining consent, a \"Time-out\" was called to verify the correct patient, procedure/location, allergies, relevant medications held for procedure and that all equipment is functioning and available. The patient was then situated in a seated upright position and the appropriate landmarks were identified using ultrasound. The site of maximum fluid collection was marked. The skin over the puncture site in the left lower posterior hemithorax region was prepped with surgical skin prep and draped in a sterile fashion. Local anesthesia was administered by infiltration using 2% Lidocaine without epinephrine administered subcutaneously.  A 6 Syriac safe-t-centesis catheter was then advanced into the pleural cavity and the needle was withdrawn.  Pleural fluid return was clear straw colored. The catheter was then withdrawn and a sterile dressing was placed over the

## 2025-02-17 NOTE — INTERVAL H&P NOTE
IR H&P UPDATE NOTE  [facility] IMCU    Patient's History and Physical Examination were reviewed from current hospital admission records.    Liane Booker appears likely to able to tolerate the requested Thoracentesis procedure by the consultant.    Risk and benefits were discussed with daughter including ultimate complications, possibly death and consent was obtained.    Electronically signed by MINNIE Gastelum   DD: 2/17/25  3:32 PM

## 2025-02-17 NOTE — CARE COORDINATION
2/17:  Update CM Note:  Pt presented to the ER for nausea, sob & not feeling well from home. Pt is on 3L/NC at 98%, Iv Lasix, Iv Apresoline & Sq Lovenox. CHF Educator, Cardiology & GS consulted. Pt is for IR Thoracentesis today.  Pt's  dc plan is home with family to transport.  CM is waiting for PT/OT evals to help with dc planning.  Pt last ambulated to the bathroom on Friday.  Pt hasn't been up at all.  Pt is open to [facility] & has no preferences.  CM sent referral to [facility] & they can accept.   CM placed order just will need to call when dc.  Per Family they are adamant with dc plan of home.  Pt has has preferences of [facility] & declined a list.  Will need 02 testing.  Sw/CM will continue to follow.  Electronically signed by Shaye Godwin RN on 2/17/2025 at 3:38 PM

## 2025-02-17 NOTE — BRIEF OP NOTE
Brief-Op Note  ____________________________________________________________      IR  U/S GUIDED THORACENTESIS  [facility] 6WE IMCU    Patient Name: Liane Booker   YOB: 1926  Medical Record Number: [id number]  Date of Procedure: 2/17/25  Room/Bed: [facility]    Preoperative diagnosis: bilateral Pleural Effusion    Postoperative diagnosis: Same    Consent: The daughter was counseled regarding the procedure, it's indications, risks, potential complications and alternatives and any questions were answered.     Procedure:  Image Guided left Thoracentesis.    Anesthesia: Local anesthesia.    Performed by: MINNIE Gastelum under on-site supervision by Chon Garcia MD.    Estimated blood loss: Less than 10 mL    Complications: None    Specimen Obtained: A total volume of  450mL was withdrawn    Electronically signed by MINNIE Gastelum   DD: 2/17/25  3:32 PM

## 2025-02-17 NOTE — PROCEDURES
PROCEDURE NOTE  Date: 2/17/2025   Name: Liane Booker  YOB: 1926    Procedures: Thoracentesis   Discussed patient and IR procedure with bedside RN, all questions answered. Will call if able to send for patient.

## 2025-02-18 ENCOUNTER — APPOINTMENT (OUTPATIENT)
Dept: GENERAL RADIOLOGY | Age: 89
DRG: 291 | End: 2025-02-18
Payer: MEDICARE

## 2025-02-18 ENCOUNTER — APPOINTMENT (OUTPATIENT)
Dept: INTERVENTIONAL RADIOLOGY/VASCULAR | Age: 89
DRG: 291 | End: 2025-02-18
Payer: MEDICARE

## 2025-02-18 LAB
ANION GAP SERPL CALCULATED.3IONS-SCNC: 10 MMOL/L (ref 7–16)
BASOPHILS # BLD: 0.01 K/UL (ref 0–0.2)
BASOPHILS NFR BLD: 0 % (ref 0–2)
BUN SERPL-MCNC: 39 MG/DL (ref 6–23)
CALCIUM SERPL-MCNC: 9 MG/DL (ref 8.6–10.2)
CHLORIDE SERPL-SCNC: 96 MMOL/L (ref 98–107)
CO2 SERPL-SCNC: 33 MMOL/L (ref 22–29)
CREAT SERPL-MCNC: 1.3 MG/DL (ref 0.5–1)
EOSINOPHIL # BLD: 0 K/UL (ref 0.05–0.5)
EOSINOPHILS RELATIVE PERCENT: 0 % (ref 0–6)
ERYTHROCYTE [DISTWIDTH] IN BLOOD BY AUTOMATED COUNT: 14.2 % (ref 11.5–15)
GFR, ESTIMATED: 37 ML/MIN/1.73M2
GLUCOSE SERPL-MCNC: 120 MG/DL (ref 74–99)
HCT VFR BLD AUTO: 31 % (ref 34–48)
HGB BLD-MCNC: 9.9 G/DL (ref 11.5–15.5)
IMM GRANULOCYTES # BLD AUTO: 0.08 K/UL (ref 0–0.58)
IMM GRANULOCYTES NFR BLD: 1 % (ref 0–5)
LYMPHOCYTES NFR BLD: 0.8 K/UL (ref 1.5–4)
LYMPHOCYTES RELATIVE PERCENT: 8 % (ref 20–42)
MCH RBC QN AUTO: 31.3 PG (ref 26–35)
MCHC RBC AUTO-ENTMCNC: 31.9 G/DL (ref 32–34.5)
MCV RBC AUTO: 98.1 FL (ref 80–99.9)
MONOCYTES NFR BLD: 1.32 K/UL (ref 0.1–0.95)
MONOCYTES NFR BLD: 13 % (ref 2–12)
NEUTROPHILS NFR BLD: 79 % (ref 43–80)
NEUTS SEG NFR BLD: 8.09 K/UL (ref 1.8–7.3)
PLATELET # BLD AUTO: 243 K/UL (ref 130–450)
PMV BLD AUTO: 11 FL (ref 7–12)
POTASSIUM SERPL-SCNC: 4.6 MMOL/L (ref 3.5–5)
RBC # BLD AUTO: 3.16 M/UL (ref 3.5–5.5)
SODIUM SERPL-SCNC: 139 MMOL/L (ref 132–146)
WBC OTHER # BLD: 10.3 K/UL (ref 4.5–11.5)

## 2025-02-18 PROCEDURE — 6370000000 HC RX 637 (ALT 250 FOR IP): Performed by: INTERNAL MEDICINE

## 2025-02-18 PROCEDURE — C1729 CATH, DRAINAGE: HCPCS

## 2025-02-18 PROCEDURE — 36415 COLL VENOUS BLD VENIPUNCTURE: CPT

## 2025-02-18 PROCEDURE — 80048 BASIC METABOLIC PNL TOTAL CA: CPT

## 2025-02-18 PROCEDURE — 6370000000 HC RX 637 (ALT 250 FOR IP): Performed by: NURSE PRACTITIONER

## 2025-02-18 PROCEDURE — 97161 PT EVAL LOW COMPLEX 20 MIN: CPT

## 2025-02-18 PROCEDURE — 2140000000 HC CCU INTERMEDIATE R&B

## 2025-02-18 PROCEDURE — 97530 THERAPEUTIC ACTIVITIES: CPT

## 2025-02-18 PROCEDURE — 6370000000 HC RX 637 (ALT 250 FOR IP)

## 2025-02-18 PROCEDURE — 6360000002 HC RX W HCPCS: Performed by: PHYSICIAN ASSISTANT

## 2025-02-18 PROCEDURE — 94640 AIRWAY INHALATION TREATMENT: CPT

## 2025-02-18 PROCEDURE — 97535 SELF CARE MNGMENT TRAINING: CPT

## 2025-02-18 PROCEDURE — 2700000000 HC OXYGEN THERAPY PER DAY

## 2025-02-18 PROCEDURE — 32555 ASPIRATE PLEURA W/ IMAGING: CPT

## 2025-02-18 PROCEDURE — 71045 X-RAY EXAM CHEST 1 VIEW: CPT

## 2025-02-18 PROCEDURE — 85025 COMPLETE CBC W/AUTO DIFF WBC: CPT

## 2025-02-18 PROCEDURE — 6360000002 HC RX W HCPCS: Performed by: INTERNAL MEDICINE

## 2025-02-18 PROCEDURE — 97165 OT EVAL LOW COMPLEX 30 MIN: CPT

## 2025-02-18 PROCEDURE — 2500000003 HC RX 250 WO HCPCS: Performed by: NURSE PRACTITIONER

## 2025-02-18 RX ORDER — LIDOCAINE HYDROCHLORIDE 20 MG/ML
INJECTION, SOLUTION INFILTRATION; PERINEURAL PRN
Status: DISCONTINUED | OUTPATIENT
Start: 2025-02-18 | End: 2025-02-20 | Stop reason: HOSPADM

## 2025-02-18 RX ADMIN — FUROSEMIDE 40 MG: 10 INJECTION, SOLUTION INTRAMUSCULAR; INTRAVENOUS at 08:50

## 2025-02-18 RX ADMIN — SODIUM CHLORIDE, PRESERVATIVE FREE 10 ML: 5 INJECTION INTRAVENOUS at 08:51

## 2025-02-18 RX ADMIN — TRAZODONE HYDROCHLORIDE 25 MG: 50 TABLET ORAL at 20:19

## 2025-02-18 RX ADMIN — FUROSEMIDE 40 MG: 10 INJECTION, SOLUTION INTRAMUSCULAR; INTRAVENOUS at 17:44

## 2025-02-18 RX ADMIN — IPRATROPIUM BROMIDE AND ALBUTEROL SULFATE 1 DOSE: 2.5; .5 SOLUTION RESPIRATORY (INHALATION) at 20:37

## 2025-02-18 RX ADMIN — GABAPENTIN 300 MG: 300 CAPSULE ORAL at 08:49

## 2025-02-18 RX ADMIN — MIRTAZAPINE 30 MG: 15 TABLET, FILM COATED ORAL at 20:19

## 2025-02-18 RX ADMIN — BUPROPION HYDROCHLORIDE 150 MG: 150 TABLET, EXTENDED RELEASE ORAL at 08:49

## 2025-02-18 RX ADMIN — POTASSIUM CHLORIDE 20 MEQ: 1500 TABLET, EXTENDED RELEASE ORAL at 08:50

## 2025-02-18 RX ADMIN — MIDODRINE HYDROCHLORIDE 2.5 MG: 2.5 TABLET ORAL at 17:44

## 2025-02-18 RX ADMIN — IPRATROPIUM BROMIDE AND ALBUTEROL SULFATE 1 DOSE: 2.5; .5 SOLUTION RESPIRATORY (INHALATION) at 16:04

## 2025-02-18 RX ADMIN — IPRATROPIUM BROMIDE AND ALBUTEROL SULFATE 1 DOSE: 2.5; .5 SOLUTION RESPIRATORY (INHALATION) at 13:07

## 2025-02-18 RX ADMIN — BISACODYL 10 MG: 5 TABLET, COATED ORAL at 08:49

## 2025-02-18 RX ADMIN — METOPROLOL SUCCINATE 12.5 MG: 25 TABLET, EXTENDED RELEASE ORAL at 08:49

## 2025-02-18 RX ADMIN — POLYETHYLENE GLYCOL 3350 17 G: 17 POWDER, FOR SOLUTION ORAL at 08:50

## 2025-02-18 RX ADMIN — OXYCODONE HYDROCHLORIDE AND ACETAMINOPHEN 1 TABLET: 5; 325 TABLET ORAL at 23:44

## 2025-02-18 RX ADMIN — MIDODRINE HYDROCHLORIDE 2.5 MG: 2.5 TABLET ORAL at 08:49

## 2025-02-18 RX ADMIN — CETIRIZINE HYDROCHLORIDE 10 MG: 10 TABLET, FILM COATED ORAL at 08:50

## 2025-02-18 RX ADMIN — SENNOSIDES 8.6 MG: 8.6 TABLET, COATED ORAL at 20:19

## 2025-02-18 RX ADMIN — ATORVASTATIN CALCIUM 40 MG: 40 TABLET, FILM COATED ORAL at 08:49

## 2025-02-18 RX ADMIN — GABAPENTIN 300 MG: 300 CAPSULE ORAL at 20:19

## 2025-02-18 RX ADMIN — LEVOTHYROXINE SODIUM 125 MCG: 0.05 TABLET ORAL at 08:49

## 2025-02-18 RX ADMIN — LIDOCAINE HYDROCHLORIDE 10 ML: 20 INJECTION, SOLUTION INFILTRATION; PERINEURAL at 11:18

## 2025-02-18 RX ADMIN — PANTOPRAZOLE SODIUM 40 MG: 40 TABLET, DELAYED RELEASE ORAL at 05:27

## 2025-02-18 RX ADMIN — SODIUM CHLORIDE, PRESERVATIVE FREE 10 ML: 5 INJECTION INTRAVENOUS at 20:19

## 2025-02-18 RX ADMIN — MIDODRINE HYDROCHLORIDE 2.5 MG: 2.5 TABLET ORAL at 12:30

## 2025-02-18 ASSESSMENT — PAIN DESCRIPTION - DESCRIPTORS: DESCRIPTORS: ACHING;DISCOMFORT;THROBBING

## 2025-02-18 ASSESSMENT — PAIN DESCRIPTION - ORIENTATION: ORIENTATION: MID

## 2025-02-18 ASSESSMENT — PAIN DESCRIPTION - LOCATION: LOCATION: VAGINA

## 2025-02-18 ASSESSMENT — PAIN - FUNCTIONAL ASSESSMENT: PAIN_FUNCTIONAL_ASSESSMENT: ACTIVITIES ARE NOT PREVENTED

## 2025-02-18 ASSESSMENT — PAIN SCALES - GENERAL
PAINLEVEL_OUTOF10: 0
PAINLEVEL_OUTOF10: 9

## 2025-02-18 NOTE — PLAN OF CARE
Problem: Chronic Conditions and Co-morbidities  Goal: Patient's chronic conditions and co-morbidity symptoms are monitored and maintained or improved  Outcome: Progressing  Flowsheets (Taken 2/17/2025 2024)  Care Plan - Patient's Chronic Conditions and Co-Morbidity Symptoms are Monitored and Maintained or Improved: Monitor and assess patient's chronic conditions and comorbid symptoms for stability, deterioration, or improvement     Problem: Discharge Planning  Goal: Discharge to home or other facility with appropriate resources  Outcome: Progressing  Flowsheets (Taken 2/17/2025 2024)  Discharge to home or other facility with appropriate resources: Identify barriers to discharge with patient and caregiver     Problem: Pain  Goal: Verbalizes/displays adequate comfort level or baseline comfort level  Outcome: Progressing     Problem: ABCDS Injury Assessment  Goal: Absence of physical injury  Outcome: Progressing     Problem: Safety - Adult  Goal: Free from fall injury  Outcome: Progressing     Problem: Skin/Tissue Integrity  Goal: Skin integrity remains intact  Description: 1.  Monitor for areas of redness and/or skin breakdown  2.  Assess vascular access sites hourly  3.  Every 4-6 hours minimum:  Change oxygen saturation probe site  4.  Every 4-6 hours:  If on nasal continuous positive airway pressure, respiratory therapy assess nares and determine need for appliance change or resting period  Outcome: Progressing  Flowsheets (Taken 2/17/2025 2024)  Skin Integrity Remains Intact: Monitor for areas of redness and/or skin breakdown

## 2025-02-18 NOTE — PROCEDURES
[facility]              [address] [place] [address]                           PULMONARY FUNCTION      PATIENT NAME: MARY ALEXANDRA             : 04/15/1926  MED REC NO: [id number]                        ROOM: 6420  ACCOUNT NO: [id number]                       ADMIT DATE: 2025  PROVIDER: Tashi Cagle MD      DATE OF PROCEDURE: 2025    SURGEON:  Tashi Cagle MD    The patient had spirometry, but the test was not valid as the patient had difficulty keeping the seal around the mouthpiece.  FVC and FEV1s are not obtained.  This was an incomplete and invalid test.          TASHI CAGLE MD      D:  2025 13:15:31     T:  2025 23:09:13     ELISABETH/EDISON  Job #:  [id number]     Doc#:  [id number]

## 2025-02-18 NOTE — CARE COORDINATION
2/18:  Update CM Note:  CM spoke with daughter to see if they are still set on their plans for home.  They are requesting PT/OT to see tomorrow with her at bedside to assess.  Electronically signed by Shaye Godwin RN on 2/18/2025 at 3:32 PM

## 2025-02-18 NOTE — CARE COORDINATION
2/18:  Update CM Note:  Pt presented to the ER for nausea, sob & not feeling well from home. Pt is on 1L/NC at 94%, Iv Lasix, Iv Apresoline & Sq Lovenox.  Pt is for IR right Thoracentesis today.  Pt's  dc plan is home with family to transport.  PT 15/24 OT pending.  Cm discussed with daughter who will talk with pt to verify dc plan.  Pt is open to [facility] & has no preferences.  CM sent referral to [facility] & they can accept.  CM placed order just will need to call when dc.  Per Family they are adamant with dc plan of home.  Pt has has preferences of [facility] & declined a list.  Will need 02 testing.  Sw/FREDERICK will continue to follow.  Electronically signed by Shaye Godwin RN on 2/18/2025 at 12:01 PM

## 2025-02-18 NOTE — OR NURSING
Thoracentesis catheter removed at this time. 300cc clear yellow fluid removed. Patient tolerated well. Vaseline gauze and tegaderm applied to puncture site.

## 2025-02-18 NOTE — PLAN OF CARE
Problem: Chronic Conditions and Co-morbidities  Goal: Patient's chronic conditions and co-morbidity symptoms are monitored and maintained or improved  2/18/2025 1113 by Elen Cox RN  Outcome: Progressing  2/17/2025 2208 by Sudha England RN  Outcome: Progressing  Flowsheets (Taken 2/17/2025 2024)  Care Plan - Patient's Chronic Conditions and Co-Morbidity Symptoms are Monitored and Maintained or Improved: Monitor and assess patient's chronic conditions and comorbid symptoms for stability, deterioration, or improvement     Problem: Discharge Planning  Goal: Discharge to home or other facility with appropriate resources  2/18/2025 1113 by Elen Cox RN  Outcome: Progressing  2/17/2025 2208 by Sudha England RN  Outcome: Progressing  Flowsheets (Taken 2/17/2025 2024)  Discharge to home or other facility with appropriate resources: Identify barriers to discharge with patient and caregiver     Problem: Pain  Goal: Verbalizes/displays adequate comfort level or baseline comfort level  2/18/2025 1113 by Elen Cox RN  Outcome: Progressing  2/17/2025 2208 by Sudha England RN  Outcome: Progressing     Problem: ABCDS Injury Assessment  Goal: Absence of physical injury  2/18/2025 1113 by Elen Cox RN  Outcome: Progressing  2/17/2025 2208 by Sudha England RN  Outcome: Progressing     Problem: Safety - Adult  Goal: Free from fall injury  2/18/2025 1113 by Elen Cox RN  Outcome: Progressing  2/17/2025 2208 by Sudha England RN  Outcome: Progressing     Problem: Skin/Tissue Integrity  Goal: Skin integrity remains intact  Description: 1.  Monitor for areas of redness and/or skin breakdown  2.  Assess vascular access sites hourly  3.  Every 4-6 hours minimum:  Change oxygen saturation probe site  4.  Every 4-6 hours:  If on nasal continuous positive airway pressure, respiratory therapy assess nares and determine need for appliance change or resting period  2/18/2025

## 2025-02-18 NOTE — PROCEDURES
PROCEDURE NOTE  Date: 2/18/2025   Name: Liane Booker  YOB: 1926    Procedures: Right thoracentesis    Patient arrived to Radiology department for Right thoracentesis. Allergies, home medications, H&P and fasting instructions reviewed with patient. Vital signs taken. Procedural instructions given, questions answered, understanding expressed and consent signed. Patient given fluoroscopy education, no questions at this time.

## 2025-02-19 LAB
ERYTHROCYTE [DISTWIDTH] IN BLOOD BY AUTOMATED COUNT: 14.3 % (ref 11.5–15)
HCT VFR BLD AUTO: 34.1 % (ref 34–48)
HGB BLD-MCNC: 10.8 G/DL (ref 11.5–15.5)
MCH RBC QN AUTO: 31.3 PG (ref 26–35)
MCHC RBC AUTO-ENTMCNC: 31.7 G/DL (ref 32–34.5)
MCV RBC AUTO: 98.8 FL (ref 80–99.9)
PARTIAL THROMBOPLASTIN TIME: 27.9 SEC (ref 24.5–35.1)
PLATELET # BLD AUTO: 251 K/UL (ref 130–450)
PMV BLD AUTO: 11.4 FL (ref 7–12)
RBC # BLD AUTO: 3.45 M/UL (ref 3.5–5.5)
TROPONIN I SERPL HS-MCNC: 101 NG/L (ref 0–9)
WBC OTHER # BLD: 11.1 K/UL (ref 4.5–11.5)

## 2025-02-19 PROCEDURE — 85730 THROMBOPLASTIN TIME PARTIAL: CPT

## 2025-02-19 PROCEDURE — 6370000000 HC RX 637 (ALT 250 FOR IP)

## 2025-02-19 PROCEDURE — 2500000003 HC RX 250 WO HCPCS: Performed by: NURSE PRACTITIONER

## 2025-02-19 PROCEDURE — 94640 AIRWAY INHALATION TREATMENT: CPT

## 2025-02-19 PROCEDURE — 36415 COLL VENOUS BLD VENIPUNCTURE: CPT

## 2025-02-19 PROCEDURE — 97535 SELF CARE MNGMENT TRAINING: CPT

## 2025-02-19 PROCEDURE — 6360000002 HC RX W HCPCS: Performed by: NURSE PRACTITIONER

## 2025-02-19 PROCEDURE — 6370000000 HC RX 637 (ALT 250 FOR IP): Performed by: INTERNAL MEDICINE

## 2025-02-19 PROCEDURE — 2700000000 HC OXYGEN THERAPY PER DAY

## 2025-02-19 PROCEDURE — 97530 THERAPEUTIC ACTIVITIES: CPT

## 2025-02-19 PROCEDURE — 93005 ELECTROCARDIOGRAM TRACING: CPT | Performed by: INTERNAL MEDICINE

## 2025-02-19 PROCEDURE — 6360000002 HC RX W HCPCS: Performed by: INTERNAL MEDICINE

## 2025-02-19 PROCEDURE — 6370000000 HC RX 637 (ALT 250 FOR IP): Performed by: NURSE PRACTITIONER

## 2025-02-19 PROCEDURE — 84484 ASSAY OF TROPONIN QUANT: CPT

## 2025-02-19 PROCEDURE — 85027 COMPLETE CBC AUTOMATED: CPT

## 2025-02-19 PROCEDURE — 2140000000 HC CCU INTERMEDIATE R&B

## 2025-02-19 RX ORDER — FUROSEMIDE 20 MG/1
20 TABLET ORAL DAILY
Status: DISCONTINUED | OUTPATIENT
Start: 2025-02-19 | End: 2025-02-20 | Stop reason: HOSPADM

## 2025-02-19 RX ORDER — MIDODRINE HYDROCHLORIDE 2.5 MG/1
2.5 TABLET ORAL
Qty: 90 TABLET | Refills: 1 | Status: SHIPPED | OUTPATIENT
Start: 2025-02-19

## 2025-02-19 RX ORDER — HEPARIN SODIUM 1000 [USP'U]/ML
60 INJECTION, SOLUTION INTRAVENOUS; SUBCUTANEOUS ONCE
Status: COMPLETED | OUTPATIENT
Start: 2025-02-19 | End: 2025-02-19

## 2025-02-19 RX ORDER — HEPARIN SODIUM 1000 [USP'U]/ML
30 INJECTION, SOLUTION INTRAVENOUS; SUBCUTANEOUS PRN
Status: DISCONTINUED | OUTPATIENT
Start: 2025-02-19 | End: 2025-02-20

## 2025-02-19 RX ORDER — HEPARIN SODIUM 10000 [USP'U]/100ML
5-30 INJECTION, SOLUTION INTRAVENOUS CONTINUOUS
Status: DISCONTINUED | OUTPATIENT
Start: 2025-02-19 | End: 2025-02-20

## 2025-02-19 RX ORDER — HEPARIN SODIUM 1000 [USP'U]/ML
60 INJECTION, SOLUTION INTRAVENOUS; SUBCUTANEOUS PRN
Status: DISCONTINUED | OUTPATIENT
Start: 2025-02-19 | End: 2025-02-20

## 2025-02-19 RX ADMIN — HEPARIN SODIUM 12 UNITS/KG/HR: 10000 INJECTION, SOLUTION INTRAVENOUS at 21:11

## 2025-02-19 RX ADMIN — IPRATROPIUM BROMIDE AND ALBUTEROL SULFATE 1 DOSE: 2.5; .5 SOLUTION RESPIRATORY (INHALATION) at 17:11

## 2025-02-19 RX ADMIN — MIRTAZAPINE 30 MG: 15 TABLET, FILM COATED ORAL at 22:02

## 2025-02-19 RX ADMIN — PANTOPRAZOLE SODIUM 40 MG: 40 TABLET, DELAYED RELEASE ORAL at 06:02

## 2025-02-19 RX ADMIN — SODIUM CHLORIDE, PRESERVATIVE FREE 10 ML: 5 INJECTION INTRAVENOUS at 22:03

## 2025-02-19 RX ADMIN — FUROSEMIDE 40 MG: 10 INJECTION, SOLUTION INTRAMUSCULAR; INTRAVENOUS at 08:12

## 2025-02-19 RX ADMIN — GABAPENTIN 300 MG: 300 CAPSULE ORAL at 22:02

## 2025-02-19 RX ADMIN — GABAPENTIN 300 MG: 300 CAPSULE ORAL at 08:12

## 2025-02-19 RX ADMIN — LEVOTHYROXINE SODIUM 125 MCG: 0.05 TABLET ORAL at 08:11

## 2025-02-19 RX ADMIN — HEPARIN SODIUM 3900 UNITS: 1000 INJECTION INTRAVENOUS; SUBCUTANEOUS at 21:10

## 2025-02-19 RX ADMIN — SODIUM CHLORIDE, PRESERVATIVE FREE 10 ML: 5 INJECTION INTRAVENOUS at 08:13

## 2025-02-19 RX ADMIN — MIDODRINE HYDROCHLORIDE 2.5 MG: 2.5 TABLET ORAL at 08:11

## 2025-02-19 RX ADMIN — POTASSIUM CHLORIDE 20 MEQ: 1500 TABLET, EXTENDED RELEASE ORAL at 08:11

## 2025-02-19 RX ADMIN — BUPROPION HYDROCHLORIDE 150 MG: 150 TABLET, EXTENDED RELEASE ORAL at 08:12

## 2025-02-19 RX ADMIN — TRAZODONE HYDROCHLORIDE 25 MG: 50 TABLET ORAL at 22:02

## 2025-02-19 RX ADMIN — IPRATROPIUM BROMIDE AND ALBUTEROL SULFATE 1 DOSE: 2.5; .5 SOLUTION RESPIRATORY (INHALATION) at 12:33

## 2025-02-19 RX ADMIN — IPRATROPIUM BROMIDE AND ALBUTEROL SULFATE 1 DOSE: 2.5; .5 SOLUTION RESPIRATORY (INHALATION) at 21:25

## 2025-02-19 RX ADMIN — BISACODYL 10 MG: 5 TABLET, COATED ORAL at 08:11

## 2025-02-19 RX ADMIN — MIDODRINE HYDROCHLORIDE 2.5 MG: 2.5 TABLET ORAL at 16:59

## 2025-02-19 RX ADMIN — CETIRIZINE HYDROCHLORIDE 10 MG: 10 TABLET, FILM COATED ORAL at 08:11

## 2025-02-19 RX ADMIN — SENNOSIDES 8.6 MG: 8.6 TABLET, COATED ORAL at 22:02

## 2025-02-19 RX ADMIN — CLOPIDOGREL BISULFATE 75 MG: 75 TABLET ORAL at 08:12

## 2025-02-19 RX ADMIN — METOPROLOL SUCCINATE 12.5 MG: 25 TABLET, EXTENDED RELEASE ORAL at 08:11

## 2025-02-19 RX ADMIN — MIDODRINE HYDROCHLORIDE 2.5 MG: 2.5 TABLET ORAL at 12:25

## 2025-02-19 RX ADMIN — POLYETHYLENE GLYCOL 3350 17 G: 17 POWDER, FOR SOLUTION ORAL at 08:12

## 2025-02-19 RX ADMIN — ATORVASTATIN CALCIUM 40 MG: 40 TABLET, FILM COATED ORAL at 08:12

## 2025-02-19 RX ADMIN — ENOXAPARIN SODIUM 30 MG: 100 INJECTION SUBCUTANEOUS at 08:12

## 2025-02-19 ASSESSMENT — PAIN SCALES - GENERAL: PAINLEVEL_OUTOF10: 0

## 2025-02-19 NOTE — DISCHARGE SUMMARY
[facility] Hospitalist Physician Discharge Summary     Patient ID:  Liane Booker  [id number]  89 y.o.  4/15/1926    Admit date: 2/12/2025    Discharge date and time:  2/19/2025  12:28 PM    Hospital Course:   Patient Liane Booker is a 89 y.o. presented with Acute exacerbation of chronic heart failure (HCC) [I50.9]  Acute decompensated heart failure (HCC) [I50.9]    This is a 89-year-old female with a past medical history of anxiety, depression, GERD, HLD, HTN, hypothyroidism, dysphagia, valvular heart disease, and OA who presents to the ED with nausea and a general feeling of unwell.  Patient's symptoms started yesterday after she had a nerve block procedure for her neck. She states her nausea started as she was coming out of anesthesia. Patient does complain of mild nausea this morning without any vomiting. Her main complaint today is just a generalized feeling of unwell.  Patient states she has been experiencing increased bilateral lower extremity swelling as well as orthopnea.  She been treated here for exacerbation of chronic systolic heart failure with IV Lasix, cardiology consulted.  She also have severe aortic stenosis but patient declined to have TAVR.  She is requiring oxygen here 3 L/min where at home she does not require.  Her chest x-ray shows fluid accumulation on the right than left.  CT chest shows bilateral large to moderate pleural effusion and thoracocentesis IR guided ordered.  She is agreed to have TAVR surgery for that reason CT chest done.  She will go home and she will have TAVR done at 2/26.  Dental clearance obtained.     2/17: S/p left thoracentesis 450 cc removed     2/18: S/p right thoracentesis 300 cc removed.  Cleared by cardiology for discharge.    2/19: Seen by dentistry for dental clearance for cardiac surgery.  They have cleared the patient to proceed with procedure.  Discharge home today once home O2 needs have been determined      [facility]

## 2025-02-19 NOTE — PLAN OF CARE
Problem: Chronic Conditions and Co-morbidities  Goal: Patient's chronic conditions and co-morbidity symptoms are monitored and maintained or improved  2/19/2025 1430 by Elen Cox RN  Outcome: Progressing  2/19/2025 0128 by Zuleima Williamson RN  Outcome: Progressing     Problem: Discharge Planning  Goal: Discharge to home or other facility with appropriate resources  2/19/2025 1430 by Elen Cox RN  Outcome: Progressing  2/19/2025 0128 by Zuleima Williamson RN  Outcome: Progressing     Problem: Pain  Goal: Verbalizes/displays adequate comfort level or baseline comfort level  2/19/2025 1430 by Elen Cox RN  Outcome: Progressing  2/19/2025 0128 by Zuleima Williamson RN  Outcome: Progressing     Problem: ABCDS Injury Assessment  Goal: Absence of physical injury  2/19/2025 1430 by Elen Cox RN  Outcome: Progressing  2/19/2025 0128 by Zuleima Williamson RN  Outcome: Progressing     Problem: Safety - Adult  Goal: Free from fall injury  2/19/2025 1430 by Elen Cox RN  Outcome: Progressing  2/19/2025 0128 by Zuleima Williamson RN  Outcome: Progressing     Problem: Skin/Tissue Integrity  Goal: Skin integrity remains intact  Description: 1.  Monitor for areas of redness and/or skin breakdown  2.  Assess vascular access sites hourly  3.  Every 4-6 hours minimum:  Change oxygen saturation probe site  4.  Every 4-6 hours:  If on nasal continuous positive airway pressure, respiratory therapy assess nares and determine need for appliance change or resting period  2/19/2025 1430 by Elen Cox RN  Outcome: Progressing  2/19/2025 0128 by Zuleima Williamson RN  Outcome: Progressing

## 2025-02-19 NOTE — CARE COORDINATION
Social Work/ Case Management Transition of Care Planning (Narda Lindsay, [phone]):     SHANIA called Keshia GUTIERREZ to inform of todays discharge. Ambulating pulse ox testing is complete. Home O2 orders are placed and it will be delivered to Pt. RN aware.   Narda Lindsay, ELVIN  2/19/2025

## 2025-02-19 NOTE — PLAN OF CARE
Problem: Chronic Conditions and Co-morbidities  Goal: Patient's chronic conditions and co-morbidity symptoms are monitored and maintained or improved  2/18/2025 2149 by Mary Jane Carter RN  Outcome: Progressing  2/18/2025 1113 by Elen Cox RN  Outcome: Progressing     Problem: Discharge Planning  Goal: Discharge to home or other facility with appropriate resources  2/18/2025 2149 by Mary Jane Carter RN  Outcome: Progressing  2/18/2025 1113 by Elen Cox RN  Outcome: Progressing     Problem: Pain  Goal: Verbalizes/displays adequate comfort level or baseline comfort level  2/18/2025 2149 by Mary Jane Carter RN  Outcome: Progressing  2/18/2025 1113 by Elen Cox RN  Outcome: Progressing     Problem: ABCDS Injury Assessment  Goal: Absence of physical injury  2/18/2025 1113 by Elen Cox RN  Outcome: Progressing     Problem: Safety - Adult  Goal: Free from fall injury  2/18/2025 1113 by Elen Cox RN  Outcome: Progressing     Problem: Skin/Tissue Integrity  Goal: Skin integrity remains intact  2/18/2025 1113 by Elen Cox RN  Outcome: Progressing

## 2025-02-19 NOTE — CARE COORDINATION
2/19:  Update CM Note:  Pt presented to the ER for nausea, sob & not feeling well from home. Pt is on 1L/NC at 93%, Iv Lasix, Iv Apresoline & Sq Lovenox.  Pt's  dc plan is home with family to transport.  PT 15/24 OT 15/24.  Pt's dc plan is home & family to transport.  Family is in agreement.  [facility] can accept & order placed.  Pt has has preferences of [facility] & declined a list.  Will need 02 testing.  Sw/CM will continue to follow.  Electronically signed by Shaye Godwin RN on 2/19/2025 at 11:51 AM

## 2025-02-19 NOTE — PLAN OF CARE
Problem: Chronic Conditions and Co-morbidities  Goal: Patient's chronic conditions and co-morbidity symptoms are monitored and maintained or improved  2/19/2025 0128 by Zuleima Williamson RN  Outcome: Progressing  2/18/2025 2149 by Mary Jane Carter RN  Outcome: Progressing     Problem: Discharge Planning  Goal: Discharge to home or other facility with appropriate resources  2/19/2025 0128 by Zuleima Williamson RN  Outcome: Progressing  2/18/2025 2149 by Mary Jane Carter RN  Outcome: Progressing     Problem: Pain  Goal: Verbalizes/displays adequate comfort level or baseline comfort level  2/19/2025 0128 by Zuleima Williamson RN  Outcome: Progressing  2/18/2025 2149 by Mary Jnae Carter RN  Outcome: Progressing     Problem: ABCDS Injury Assessment  Goal: Absence of physical injury  Outcome: Progressing     Problem: Safety - Adult  Goal: Free from fall injury  Outcome: Progressing     Problem: Skin/Tissue Integrity  Goal: Skin integrity remains intact  Description: 1.  Monitor for areas of redness and/or skin breakdown  2.  Assess vascular access sites hourly  3.  Every 4-6 hours minimum:  Change oxygen saturation probe site  4.  Every 4-6 hours:  If on nasal continuous positive airway pressure, respiratory therapy assess nares and determine need for appliance change or resting period  Outcome: Progressing

## 2025-02-20 VITALS
WEIGHT: 143.96 LBS | SYSTOLIC BLOOD PRESSURE: 101 MMHG | HEIGHT: 63 IN | HEART RATE: 90 BPM | TEMPERATURE: 97.7 F | OXYGEN SATURATION: 99 % | DIASTOLIC BLOOD PRESSURE: 59 MMHG | BODY MASS INDEX: 25.51 KG/M2 | RESPIRATION RATE: 18 BRPM

## 2025-02-20 LAB
AMORPH SED URNS QL MICRO: PRESENT
ANION GAP SERPL CALCULATED.3IONS-SCNC: 12 MMOL/L (ref 7–16)
BACTERIA URNS QL MICRO: ABNORMAL
BILIRUB UR QL STRIP: ABNORMAL
BUN SERPL-MCNC: 35 MG/DL (ref 6–23)
CALCIUM SERPL-MCNC: 9 MG/DL (ref 8.6–10.2)
CHLORIDE SERPL-SCNC: 91 MMOL/L (ref 98–107)
CLARITY UR: ABNORMAL
CO2 SERPL-SCNC: 35 MMOL/L (ref 22–29)
COLOR UR: YELLOW
CREAT SERPL-MCNC: 1.1 MG/DL (ref 0.5–1)
GFR, ESTIMATED: 46 ML/MIN/1.73M2
GLUCOSE SERPL-MCNC: 136 MG/DL (ref 74–99)
GLUCOSE UR STRIP-MCNC: NEGATIVE MG/DL
HGB UR QL STRIP.AUTO: NEGATIVE
KETONES UR STRIP-MCNC: NEGATIVE MG/DL
LEUKOCYTE ESTERASE UR QL STRIP: NEGATIVE
NITRITE UR QL STRIP: POSITIVE
PARTIAL THROMBOPLASTIN TIME: 37.4 SEC (ref 24.5–35.1)
PARTIAL THROMBOPLASTIN TIME: 49.6 SEC (ref 24.5–35.1)
PARTIAL THROMBOPLASTIN TIME: 57.8 SEC (ref 24.5–35.1)
PH UR STRIP: 5.5 [PH] (ref 5–8)
POTASSIUM SERPL-SCNC: 4.1 MMOL/L (ref 3.5–5)
PROT UR STRIP-MCNC: 100 MG/DL
RBC #/AREA URNS HPF: ABNORMAL /HPF
SODIUM SERPL-SCNC: 138 MMOL/L (ref 132–146)
SP GR UR STRIP: >1.03 (ref 1–1.03)
TROPONIN I SERPL HS-MCNC: 98 NG/L (ref 0–9)
UROBILINOGEN UR STRIP-ACNC: 1 EU/DL (ref 0–1)
WBC #/AREA URNS HPF: ABNORMAL /HPF

## 2025-02-20 PROCEDURE — 87081 CULTURE SCREEN ONLY: CPT

## 2025-02-20 PROCEDURE — 6370000000 HC RX 637 (ALT 250 FOR IP): Performed by: NURSE PRACTITIONER

## 2025-02-20 PROCEDURE — 2700000000 HC OXYGEN THERAPY PER DAY

## 2025-02-20 PROCEDURE — 6370000000 HC RX 637 (ALT 250 FOR IP): Performed by: INTERNAL MEDICINE

## 2025-02-20 PROCEDURE — 6360000002 HC RX W HCPCS: Performed by: INTERNAL MEDICINE

## 2025-02-20 PROCEDURE — 6370000000 HC RX 637 (ALT 250 FOR IP)

## 2025-02-20 PROCEDURE — 85730 THROMBOPLASTIN TIME PARTIAL: CPT

## 2025-02-20 PROCEDURE — 2500000003 HC RX 250 WO HCPCS: Performed by: NURSE PRACTITIONER

## 2025-02-20 PROCEDURE — 81001 URINALYSIS AUTO W/SCOPE: CPT

## 2025-02-20 PROCEDURE — 80048 BASIC METABOLIC PNL TOTAL CA: CPT

## 2025-02-20 PROCEDURE — 87086 URINE CULTURE/COLONY COUNT: CPT

## 2025-02-20 PROCEDURE — 94640 AIRWAY INHALATION TREATMENT: CPT

## 2025-02-20 PROCEDURE — 36415 COLL VENOUS BLD VENIPUNCTURE: CPT

## 2025-02-20 PROCEDURE — 84484 ASSAY OF TROPONIN QUANT: CPT

## 2025-02-20 RX ORDER — ALBUTEROL SULFATE 90 UG/1
2 INHALANT RESPIRATORY (INHALATION) 4 TIMES DAILY PRN
Qty: 18 G | Refills: 0 | Status: SHIPPED | OUTPATIENT
Start: 2025-02-20

## 2025-02-20 RX ADMIN — LEVOTHYROXINE SODIUM 125 MCG: 0.05 TABLET ORAL at 11:33

## 2025-02-20 RX ADMIN — POTASSIUM CHLORIDE 20 MEQ: 1500 TABLET, EXTENDED RELEASE ORAL at 11:32

## 2025-02-20 RX ADMIN — CETIRIZINE HYDROCHLORIDE 10 MG: 10 TABLET, FILM COATED ORAL at 11:34

## 2025-02-20 RX ADMIN — IPRATROPIUM BROMIDE AND ALBUTEROL SULFATE 1 DOSE: 2.5; .5 SOLUTION RESPIRATORY (INHALATION) at 15:44

## 2025-02-20 RX ADMIN — GABAPENTIN 300 MG: 300 CAPSULE ORAL at 11:33

## 2025-02-20 RX ADMIN — HEPARIN SODIUM 1900 UNITS: 1000 INJECTION INTRAVENOUS; SUBCUTANEOUS at 04:15

## 2025-02-20 RX ADMIN — MIDODRINE HYDROCHLORIDE 2.5 MG: 2.5 TABLET ORAL at 11:32

## 2025-02-20 RX ADMIN — PANTOPRAZOLE SODIUM 40 MG: 40 TABLET, DELAYED RELEASE ORAL at 06:07

## 2025-02-20 RX ADMIN — CLOPIDOGREL BISULFATE 75 MG: 75 TABLET ORAL at 11:33

## 2025-02-20 RX ADMIN — MIDODRINE HYDROCHLORIDE 2.5 MG: 2.5 TABLET ORAL at 17:10

## 2025-02-20 RX ADMIN — FUROSEMIDE 20 MG: 20 TABLET ORAL at 11:32

## 2025-02-20 RX ADMIN — METOPROLOL SUCCINATE 12.5 MG: 25 TABLET, EXTENDED RELEASE ORAL at 11:32

## 2025-02-20 RX ADMIN — BISACODYL 10 MG: 5 TABLET, COATED ORAL at 11:33

## 2025-02-20 RX ADMIN — ATORVASTATIN CALCIUM 40 MG: 40 TABLET, FILM COATED ORAL at 11:32

## 2025-02-20 RX ADMIN — BUPROPION HYDROCHLORIDE 150 MG: 150 TABLET, EXTENDED RELEASE ORAL at 11:33

## 2025-02-20 RX ADMIN — IPRATROPIUM BROMIDE AND ALBUTEROL SULFATE 1 DOSE: 2.5; .5 SOLUTION RESPIRATORY (INHALATION) at 09:20

## 2025-02-20 RX ADMIN — SODIUM CHLORIDE, PRESERVATIVE FREE 10 ML: 5 INJECTION INTRAVENOUS at 11:34

## 2025-02-20 ASSESSMENT — PAIN SCALES - GENERAL: PAINLEVEL_OUTOF10: 0

## 2025-02-20 NOTE — DISCHARGE SUMMARY
[facility] Hospitalist Physician Discharge Summary     Patient ID:  Liane Booker  [id number]  89 y.o.  4/15/1926    Admit date: 2/12/2025    Discharge date and time:  2/20/2025  5:11 PM    Hospital Course:   Patient Liane Booker is a 89 y.o. presented with Acute exacerbation of chronic heart failure (HCC) [I50.9]  Acute decompensated heart failure (HCC) [I50.9]    This is a 89-year-old female with a past medical history of anxiety, depression, GERD, HLD, HTN, hypothyroidism, dysphagia, valvular heart disease, and OA who presents to the ED with nausea and a general feeling of unwell.  Patient's symptoms started yesterday after she had a nerve block procedure for her neck. She states her nausea started as she was coming out of anesthesia. Patient does complain of mild nausea this morning without any vomiting. Her main complaint today is just a generalized feeling of unwell.  Patient states she has been experiencing increased bilateral lower extremity swelling as well as orthopnea.  She been treated here for exacerbation of chronic systolic heart failure with IV Lasix, cardiology consulted.  She also have severe aortic stenosis but patient declined to have TAVR.  She is requiring oxygen here 3 L/min where at home she does not require.  Her chest x-ray shows fluid accumulation on the right than left.  CT chest shows bilateral large to moderate pleural effusion and thoracocentesis IR guided ordered.  She is agreed to have TAVR surgery for that reason CT chest done.  She will go home and she will have TAVR done at 2/26.  Dental clearance obtained.     2/17: S/p left thoracentesis 450 cc removed     2/18: S/p right thoracentesis 300 cc removed.  Cleared by cardiology for discharge.     2/19: Seen by dentistry for dental clearance for cardiac surgery.  They have cleared the patient to proceed with procedure.  Patient was for discharge today but then had sudden onset chest pain. Cardiology at bedside and

## 2025-02-20 NOTE — PLAN OF CARE
Problem: Safety - Adult  Goal: Able to ambulate with minimal assistance  Description: Able to ambulate with minimal assistance  Outcome: Progressing     Problem: Chronic Conditions and Co-morbidities  Goal: Patient's chronic conditions and co-morbidity symptoms are monitored and maintained or improved  Outcome: Progressing  Flowsheets (Taken 2/20/2025 8645)  Care Plan - Patient's Chronic Conditions and Co-Morbidity Symptoms are Monitored and Maintained or Improved: Monitor and assess patient's chronic conditions and comorbid symptoms for stability, deterioration, or improvement     Problem: Discharge Planning  Goal: Discharge to home or other facility with appropriate resources  Outcome: Progressing  Flowsheets (Taken 2/20/2025 1840)  Discharge to home or other facility with appropriate resources: Identify barriers to discharge with patient and caregiver

## 2025-02-20 NOTE — PROGRESS NOTES
[facility] Hospitalist Progress Note    Admitting Date and Time: 2/12/2025  7:24 AM  Admit Dx: Acute exacerbation of chronic heart failure (HCC) [I50.9]  Acute decompensated heart failure (HCC) [I50.9]    Subjective:  Patient is being followed for Acute exacerbation of chronic heart failure (HCC) [I50.9]  Acute decompensated heart failure (HCC) [I50.9]   Pt feels worse than yesterday, as per daughter she feels she have more congestion and shortness of breath.      ROS: denies fever, chills, cp, sob, n/v, HA unless stated above.      methylPREDNISolone  60 mg IntraVENous Q6H    ipratropium 0.5 mg-albuterol 2.5 mg  1 Dose Inhalation Q4H WA RT    [START ON 2/16/2025] furosemide  40 mg IntraVENous Daily    potassium chloride  20 mEq Oral Daily with breakfast    potassium chloride  40 mEq Oral Once    midodrine  2.5 mg Oral TID WC    polyethylene glycol  17 g Oral Daily    senna  1 tablet Oral Nightly    bisacodyl  10 mg Oral Daily    sodium chloride flush  5-40 mL IntraVENous 2 times per day    enoxaparin  30 mg SubCUTAneous Daily    atorvastatin  40 mg Oral Daily    buPROPion  150 mg Oral Daily    clopidogrel  75 mg Oral Daily    gabapentin  300 mg Oral BID    levothyroxine  125 mcg Oral Daily    cetirizine  10 mg Oral Daily    metoprolol succinate  12.5 mg Oral Daily    mirtazapine  30 mg Oral Nightly    pantoprazole  40 mg Oral QAM AC    traZODone  25 mg Oral Daily     diclofenac sodium, 2 g, 4x Daily PRN  sulfur hexafluoride microspheres, 2 mL, ONCE PRN  sodium chloride flush, 5-40 mL, PRN  sodium chloride, , PRN  ondansetron, 4 mg, Q8H PRN   Or  ondansetron, 4 mg, Q6H PRN  acetaminophen, 650 mg, Q6H PRN   Or  acetaminophen, 650 mg, Q6H PRN  benzocaine-menthol, 1 lozenge, Q2H PRN  benzonatate, 100 mg, TID PRN  calcium carbonate, 500 mg, TID PRN  hydrALAZINE, 10 mg, Q6H PRN  melatonin, 3 mg, Nightly PRN  Polyvinyl Alcohol-Povidone PF, 1 drop, PRN  sodium chloride, 1 spray, PRN         Objective:    BP (!) 
       [facility] Hospitalist Progress Note    Admitting Date and Time: 2/12/2025  7:24 AM  Admit Dx: Acute exacerbation of chronic heart failure (HCC) [I50.9]  Acute decompensated heart failure (HCC) [I50.9]  Synopsis:This is a 89-year-old female with a past medical history of anxiety, depression, GERD, HLD, HTN, hypothyroidism, dysphagia, valvular heart disease, and OA who presents to the ED with nausea and a general feeling of unwell.  Patient's symptoms started yesterday after she had a nerve block procedure for her neck. She states her nausea started as she was coming out of anesthesia. Patient does complain of mild nausea this morning without any vomiting. Her main complaint today is just a generalized feeling of unwell.  Patient states she has been experiencing increased bilateral lower extremity swelling as well as orthopnea.  She been treated here for exacerbation of chronic systolic heart failure with IV Lasix, cardiology consulted.  She also have severe aortic stenosis but patient declined to have TAVR.  She is requiring oxygen here 3 L/min where at home she does not require.  Her chest x-ray shows fluid accumulation on the right than left.  CT chest shows bilateral large to moderate pleural effusion and thoracocentesis IR guided ordered.  She is agreed to have TAVR surgery for that reason CT chest done.  She will go home and she will have TAVR done after couple of weeks when she is stable.  Subjective:  Patient is being followed for Acute exacerbation of chronic heart failure (HCC) [I50.9]  Acute decompensated heart failure (HCC) [I50.9]   Pt feels       ROS: denies fever, chills, cp, sob, n/v, HA unless stated above.      furosemide  40 mg IntraVENous BID    methylPREDNISolone  60 mg IntraVENous Q6H    ipratropium 0.5 mg-albuterol 2.5 mg  1 Dose Inhalation Q4H WA RT    potassium chloride  20 mEq Oral Daily with breakfast    potassium chloride  40 mEq Oral Once    midodrine  2.5 mg Oral TID     
       [facility] Hospitalist Progress Note    Admitting Date and Time: 2/12/2025  7:24 AM  Admit Dx: Acute exacerbation of chronic heart failure (HCC) [I50.9]  Acute decompensated heart failure (HCC) [I50.9]    Synopsis:    This is a 89-year-old female with a past medical history of anxiety, depression, GERD, HLD, HTN, hypothyroidism, dysphagia, valvular heart disease, and OA who presents to the ED with nausea and a general feeling of unwell.  Patient's symptoms started yesterday after she had a nerve block procedure for her neck. She states her nausea started as she was coming out of anesthesia. Patient does complain of mild nausea this morning without any vomiting. Her main complaint today is just a generalized feeling of unwell.  Patient states she has been experiencing increased bilateral lower extremity swelling as well as orthopnea.  She been treated here for exacerbation of chronic systolic heart failure with IV Lasix, cardiology consulted.  She also have severe aortic stenosis but patient declined to have TAVR.  She is requiring oxygen here 3 L/min where at home she does not require.  Her chest x-ray shows fluid accumulation on the right than left.  CT chest shows bilateral large to moderate pleural effusion and thoracocentesis IR guided ordered.  She is agreed to have TAVR surgery for that reason CT chest done.  She will go home and she will have TAVR done at 2/26.  Dental clearance obtained.     2/17: S/p left thoracentesis 450 cc removed     2/18: S/p right thoracentesis 300 cc removed.  Cleared by cardiology for discharge.     2/19: Seen by dentistry for dental clearance for cardiac surgery.  They have cleared the patient to proceed with procedure.  Patient was for discharge today but then had sudden onset chest pain. Cardiology at bedside and recommended holding discharge.    Subjective:  Patient is being followed for Acute exacerbation of chronic heart failure (HCC) [I50.9]  Acute decompensated 
       [facility] Hospitalist Progress Note    Admitting Date and Time: 2/12/2025  7:24 AM  Admit Dx: Acute exacerbation of chronic heart failure (HCC) [I50.9]  Acute decompensated heart failure (HCC) [I50.9]    Subjective:  Patient is being followed for Acute exacerbation of chronic heart failure (HCC) [I50.9]  Acute decompensated heart failure (HCC) [I50.9]   Pt feels better than yesterday.      ROS: denies fever, chills, cp, sob, n/v, HA unless stated above.      potassium chloride  40 mEq Oral Once    midodrine  2.5 mg Oral TID WC    polyethylene glycol  17 g Oral Daily    senna  1 tablet Oral Nightly    bisacodyl  10 mg Oral Daily    sodium chloride flush  5-40 mL IntraVENous 2 times per day    enoxaparin  30 mg SubCUTAneous Daily    atorvastatin  40 mg Oral Daily    buPROPion  150 mg Oral Daily    clopidogrel  75 mg Oral Daily    gabapentin  300 mg Oral BID    levothyroxine  125 mcg Oral Daily    cetirizine  10 mg Oral Daily    metoprolol succinate  12.5 mg Oral Daily    mirtazapine  30 mg Oral Nightly    pantoprazole  40 mg Oral QAM AC    traZODone  25 mg Oral Daily    furosemide  20 mg IntraVENous Daily     diclofenac sodium, 2 g, 4x Daily PRN  sulfur hexafluoride microspheres, 2 mL, ONCE PRN  sodium chloride flush, 5-40 mL, PRN  sodium chloride, , PRN  ondansetron, 4 mg, Q8H PRN   Or  ondansetron, 4 mg, Q6H PRN  acetaminophen, 650 mg, Q6H PRN   Or  acetaminophen, 650 mg, Q6H PRN  benzocaine-menthol, 1 lozenge, Q2H PRN  benzonatate, 100 mg, TID PRN  calcium carbonate, 500 mg, TID PRN  hydrALAZINE, 10 mg, Q6H PRN  melatonin, 3 mg, Nightly PRN  Polyvinyl Alcohol-Povidone PF, 1 drop, PRN  sodium chloride, 1 spray, PRN         Objective:    BP (!) 104/54   Pulse 78   Temp 97.3 °F (36.3 °C) (Temporal)   Resp 24   Ht 1.6 m (5' 3\")   Wt 67.9 kg (149 lb 11.1 oz)   SpO2 100%   BMI 26.52 kg/m²     General Appearance: alert and oriented to person, place and time and in no acute distress  Skin: warm and 
       [facility] Hospitalist Progress Note    Admitting Date and Time: 2/12/2025  7:24 AM  Admit Dx: Acute exacerbation of chronic heart failure (HCC) [I50.9]  Acute decompensated heart failure (HCC) [I50.9]    Subjective:  Patient is being followed for Acute exacerbation of chronic heart failure (HCC) [I50.9]  Acute decompensated heart failure (HCC) [I50.9]   Pt feels better than yesterday.      ROS: denies fever, chills, cp, sob, n/v, HA unless stated above.      potassium chloride  40 mEq Oral Once    midodrine  2.5 mg Oral TID WC    sodium chloride flush  5-40 mL IntraVENous 2 times per day    enoxaparin  30 mg SubCUTAneous Daily    atorvastatin  40 mg Oral Daily    buPROPion  150 mg Oral Daily    clopidogrel  75 mg Oral Daily    gabapentin  300 mg Oral BID    levothyroxine  125 mcg Oral Daily    cetirizine  10 mg Oral Daily    metoprolol succinate  12.5 mg Oral Daily    mirtazapine  30 mg Oral Nightly    pantoprazole  40 mg Oral QAM AC    traZODone  25 mg Oral Daily    furosemide  20 mg IntraVENous Daily     sulfur hexafluoride microspheres, 2 mL, ONCE PRN  sodium chloride flush, 5-40 mL, PRN  sodium chloride, , PRN  ondansetron, 4 mg, Q8H PRN   Or  ondansetron, 4 mg, Q6H PRN  polyethylene glycol, 17 g, Daily PRN  acetaminophen, 650 mg, Q6H PRN   Or  acetaminophen, 650 mg, Q6H PRN  benzocaine-menthol, 1 lozenge, Q2H PRN  benzonatate, 100 mg, TID PRN  calcium carbonate, 500 mg, TID PRN  hydrALAZINE, 10 mg, Q6H PRN  melatonin, 3 mg, Nightly PRN  Polyvinyl Alcohol-Povidone PF, 1 drop, PRN  sodium chloride, 1 spray, PRN         Objective:    BP (!) 94/57   Pulse 88   Temp 99 °F (37.2 °C) (Temporal)   Resp 24   Ht 1.6 m (5' 3\")   Wt 66.2 kg (145 lb 15.1 oz)   SpO2 92%   BMI 25.85 kg/m²     General Appearance: alert and oriented to person, place and time and in no acute distress  Skin: warm and dry  Head: normocephalic and atraumatic  Eyes: pupils equal, round, and reactive to light, extraocular eye 
    Inpatient Cardiology Progress note        SUBJECTIVE/OBJECTIVE:   Cough improved. Dental clearance occurred today. She complained of mild epigastric substernal pain that is reproducible    PHYSICAL EXAM:   BP (!) 102/58   Pulse 80   Temp 99 °F (37.2 °C) (Oral)   Resp 20   Ht 1.6 m (5' 3\")   Wt 64.4 kg (141 lb 15.6 oz)   SpO2 96%   BMI 25.15 kg/m²    B/P Range last 24 hours: Systolic (24hrs), Av , Min:95 , Max:118    Diastolic (24hrs), Av, Min:50, Max:75      GENERAL: Not in distress.   HEAD: Normocephalic, Atraumatic.   NECK: No JVD. No carotid bruits. No neck masses.?   CARDIOVASCULAR: Normal rate, regular rhythm, normal S1, late peaking systolic murmur with absent S2  LUNGS: Bilateral crackles improving, no wheezing.?   ABDOMEN: Abdomen is soft and not distended. No tenderness.  EXTREMITIES:There is trace pedal edema.   MUSCULOSKELETAL: No joint swelling. Normal range of motion?   SKIN: Skins is moist. No ulcers or lesions.   NEUROLOGICAL: Conscious, alert, oriented to time, place and person. No evidence of obvious neurological deficits.?   PSYCHOLOGICAL: Patient is in normal mood.?       Intake/Output Summary (Last 24 hours) at 2025 1708  Last data filed at 2025 1335  Gross per 24 hour   Intake 440 ml   Output 1700 ml   Net -1260 ml       Weight:   Wt Readings from Last 3 Encounters:   25 64.4 kg (141 lb 15.6 oz)   25 65.8 kg (145 lb)   24 68.5 kg (151 lb)       Current Inpatient Medications:   furosemide  20 mg Oral Daily    ipratropium 0.5 mg-albuterol 2.5 mg  1 Dose Inhalation Q4H WA RT    potassium chloride  20 mEq Oral Daily with breakfast    potassium chloride  40 mEq Oral Once    midodrine  2.5 mg Oral TID WC    polyethylene glycol  17 g Oral Daily    senna  1 tablet Oral Nightly    bisacodyl  10 mg Oral Daily    sodium chloride flush  5-40 mL IntraVENous 2 times per day    enoxaparin  30 mg SubCUTAneous Daily    atorvastatin  40 mg Oral Daily    buPROPion  
    Inpatient Cardiology Progress note        SUBJECTIVE/OBJECTIVE:   Reports that she continues to have shortness of breath and no edema.  No chest pain       PHYSICAL EXAM:   /86   Pulse (!) 117   Temp 98.2 °F (36.8 °C) (Temporal)   Resp 20   Ht 1.6 m (5' 3\")   Wt 65.2 kg (143 lb 12.8 oz)   SpO2 97%   BMI 25.47 kg/m²    B/P Range last 24 hours: Systolic (24hrs), Av , Min:105 , Max:122    Diastolic (24hrs), Av, Min:58, Max:86      GENERAL: Not in distress.   HEAD: Normocephalic, Atraumatic.   NECK: No JVD. No carotid bruits. No neck masses.?   CARDIOVASCULAR: Normal rate, regular rhythm, normal S1, late peaking systolic murmur with absent S2  LUNGS: Bilateral crackles, no wheezing.?   ABDOMEN: Abdomen is soft and not distended. No tenderness.  EXTREMITIES:There is 1+ pedal edema.   MUSCULOSKELETAL: No joint swelling. Normal range of motion?   SKIN: Skins is moist. No ulcers or lesions.   NEUROLOGICAL: Conscious, alert, oriented to time, place and person. No evidence of obvious neurological deficits.?   PSYCHOLOGICAL: Patient is in normal mood.?       Intake/Output Summary (Last 24 hours) at 2025 2233  Last data filed at 2025 1720  Gross per 24 hour   Intake 20 ml   Output 400 ml   Net -380 ml       Weight:   Wt Readings from Last 3 Encounters:   25 65.2 kg (143 lb 12.8 oz)   25 65.8 kg (145 lb)   24 68.5 kg (151 lb)       Current Inpatient Medications:   methylPREDNISolone  60 mg IntraVENous Q6H    ipratropium 0.5 mg-albuterol 2.5 mg  1 Dose Inhalation Q4H WA RT    furosemide  40 mg IntraVENous Daily    potassium chloride  20 mEq Oral Daily with breakfast    potassium chloride  40 mEq Oral Once    midodrine  2.5 mg Oral TID WC    polyethylene glycol  17 g Oral Daily    senna  1 tablet Oral Nightly    bisacodyl  10 mg Oral Daily    sodium chloride flush  5-40 mL IntraVENous 2 times per day    enoxaparin  30 mg SubCUTAneous Daily    atorvastatin  40 mg Oral Daily    
    Inpatient Cardiology Progress note        SUBJECTIVE/OBJECTIVE:   Reports that she continues to have shortness of breath and no edema.  No chest pain       PHYSICAL EXAM:   BP (!) 103/59   Pulse 84   Temp 97.7 °F (36.5 °C) (Temporal)   Resp 26   Ht 1.6 m (5' 3\")   Wt 66.2 kg (146 lb)   SpO2 98%   BMI 25.86 kg/m²    B/P Range last 24 hours: Systolic (24hrs), Av , Min:103 , Max:116    Diastolic (24hrs), Av, Min:59, Max:70      GENERAL: Not in distress.   HEAD: Normocephalic, Atraumatic.   NECK: No JVD. No carotid bruits. No neck masses.?   CARDIOVASCULAR: Normal rate, regular rhythm, normal S1, late peaking systolic murmur with absent S2  LUNGS: Bilateral crackles, no wheezing.?   ABDOMEN: Abdomen is soft and not distended. No tenderness.  EXTREMITIES:There is 1+ pedal edema.   MUSCULOSKELETAL: No joint swelling. Normal range of motion?   SKIN: Skins is moist. No ulcers or lesions.   NEUROLOGICAL: Conscious, alert, oriented to time, place and person. No evidence of obvious neurological deficits.?   PSYCHOLOGICAL: Patient is in normal mood.?       Intake/Output Summary (Last 24 hours) at 2/15/2025 1955  Last data filed at 2/15/2025 1858  Gross per 24 hour   Intake 590 ml   Output 850 ml   Net -260 ml       Weight:   Wt Readings from Last 3 Encounters:   02/15/25 66.2 kg (146 lb)   25 65.8 kg (145 lb)   24 68.5 kg (151 lb)       Current Inpatient Medications:   methylPREDNISolone  60 mg IntraVENous Q6H    ipratropium 0.5 mg-albuterol 2.5 mg  1 Dose Inhalation Q4H WA RT    [START ON 2025] furosemide  40 mg IntraVENous Daily    potassium chloride  20 mEq Oral Daily with breakfast    potassium chloride  40 mEq Oral Once    midodrine  2.5 mg Oral TID WC    polyethylene glycol  17 g Oral Daily    senna  1 tablet Oral Nightly    bisacodyl  10 mg Oral Daily    sodium chloride flush  5-40 mL IntraVENous 2 times per day    enoxaparin  30 mg SubCUTAneous Daily    atorvastatin  40 mg Oral Daily 
    Inpatient Cardiology Progress note        SUBJECTIVE/OBJECTIVE:   Reports that she continues to have some cough.  She underwent thoracentesis today      PHYSICAL EXAM:   BP 99/80   Pulse (!) 109   Temp 98.1 °F (36.7 °C) (Oral)   Resp 20   Ht 1.6 m (5' 3\")   Wt 65.2 kg (143 lb 12.8 oz)   SpO2 98%   BMI 25.47 kg/m²    B/P Range last 24 hours: Systolic (24hrs), Av , Min:99 , Max:116    Diastolic (24hrs), Av, Min:61, Max:80      GENERAL: Not in distress.   HEAD: Normocephalic, Atraumatic.   NECK: No JVD. No carotid bruits. No neck masses.?   CARDIOVASCULAR: Normal rate, regular rhythm, normal S1, late peaking systolic murmur with absent S2  LUNGS: Bilateral crackles, no wheezing.?   ABDOMEN: Abdomen is soft and not distended. No tenderness.  EXTREMITIES:There is 1+ pedal edema.   MUSCULOSKELETAL: No joint swelling. Normal range of motion?   SKIN: Skins is moist. No ulcers or lesions.   NEUROLOGICAL: Conscious, alert, oriented to time, place and person. No evidence of obvious neurological deficits.?   PSYCHOLOGICAL: Patient is in normal mood.?       Intake/Output Summary (Last 24 hours) at 2025 1649  Last data filed at 2025 1447  Gross per 24 hour   Intake 10 ml   Output 1700 ml   Net -1690 ml       Weight:   Wt Readings from Last 3 Encounters:   25 65.2 kg (143 lb 12.8 oz)   25 65.8 kg (145 lb)   24 68.5 kg (151 lb)       Current Inpatient Medications:   furosemide  40 mg IntraVENous BID    ipratropium 0.5 mg-albuterol 2.5 mg  1 Dose Inhalation Q4H WA RT    potassium chloride  20 mEq Oral Daily with breakfast    potassium chloride  40 mEq Oral Once    midodrine  2.5 mg Oral TID WC    polyethylene glycol  17 g Oral Daily    senna  1 tablet Oral Nightly    bisacodyl  10 mg Oral Daily    sodium chloride flush  5-40 mL IntraVENous 2 times per day    enoxaparin  30 mg SubCUTAneous Daily    atorvastatin  40 mg Oral Daily    buPROPion  150 mg Oral Daily    clopidogrel  75 mg Oral 
    Inpatient Cardiology Progress note        SUBJECTIVE/OBJECTIVE:   Reports that she continues to have some cough.  She underwent thoracentesis today of right lung today so now she is s/p bilateral thoracentesis      PHYSICAL EXAM:   BP (!) 107/55   Pulse 94   Temp 97.8 °F (36.6 °C) (Temporal)   Resp 20   Ht 1.6 m (5' 3\")   Wt 67.3 kg (148 lb 5.9 oz)   SpO2 98%   BMI 26.28 kg/m²    B/P Range last 24 hours: Systolic (24hrs), Av , Min:100 , Max:111    Diastolic (24hrs), Av, Min:51, Max:69      GENERAL: Not in distress.   HEAD: Normocephalic, Atraumatic.   NECK: No JVD. No carotid bruits. No neck masses.?   CARDIOVASCULAR: Normal rate, regular rhythm, normal S1, late peaking systolic murmur with absent S2  LUNGS: Bilateral crackles improving, no wheezing.?   ABDOMEN: Abdomen is soft and not distended. No tenderness.  EXTREMITIES:There is trace pedal edema.   MUSCULOSKELETAL: No joint swelling. Normal range of motion?   SKIN: Skins is moist. No ulcers or lesions.   NEUROLOGICAL: Conscious, alert, oriented to time, place and person. No evidence of obvious neurological deficits.?   PSYCHOLOGICAL: Patient is in normal mood.?       Intake/Output Summary (Last 24 hours) at 2025 1648  Last data filed at 2025 0924  Gross per 24 hour   Intake 390 ml   Output 550 ml   Net -160 ml       Weight:   Wt Readings from Last 3 Encounters:   25 67.3 kg (148 lb 5.9 oz)   25 65.8 kg (145 lb)   24 68.5 kg (151 lb)       Current Inpatient Medications:   furosemide  40 mg IntraVENous BID    ipratropium 0.5 mg-albuterol 2.5 mg  1 Dose Inhalation Q4H WA RT    potassium chloride  20 mEq Oral Daily with breakfast    potassium chloride  40 mEq Oral Once    midodrine  2.5 mg Oral TID WC    polyethylene glycol  17 g Oral Daily    senna  1 tablet Oral Nightly    bisacodyl  10 mg Oral Daily    sodium chloride flush  5-40 mL IntraVENous 2 times per day    enoxaparin  30 mg SubCUTAneous Daily    atorvastatin 
  Physician Progress Note      PATIENT:               MARY ALEXANDRA  CSN #:                  [id number]  :                       4/15/1926  ADMIT DATE:       2025 7:24 AM  DISCH DATE:  RESPONDING  PROVIDER #:        Kenyon Rodriguez MD          QUERY TEXT:    Pt admitted with increased lower extremity swelling, shortness of breath and   has acute on chronic CHF documented in [facility] & [facility]. If possible, please document in   progress notes and discharge summary further specificity regarding the type of   CHF:    The medical record reflects the following:  Risk Factors: HLD, Valvular heart disease, Hypothyroidism  Clinical Indicators: Last echo 3/24 EF 45%. Leg swelling and shortness of   breath.  CXR - no acute process. BNP 26,029.  Treatment: Labs, cardiology consult, Lasix 20 mg IV daily.    Thank you  Carolyn Amato RN, BSN, [facility]  [phone]  Options provided:  -- Acute on Chronic Systolic CHF/HFrEF  -- Acute on Chronic Systolic and Diastolic CHF  -- Other - I will add my own diagnosis  -- Disagree - Not applicable / Not valid  -- Disagree - Clinically unable to determine / Unknown  -- Refer to Clinical Documentation Reviewer    PROVIDER RESPONSE TEXT:    This patient is in acute on chronic systolic CHF/HFrEF.    Query created by: Carolyn Amato on 2025 8:49 AM      Electronically signed by:  Kenyon Rodriguez MD 2025 2:01 PM          
4 Eyes Skin Assessment     NAME:  Liane Booker  YOB: 1926  MEDICAL RECORD NUMBER:  [id number]    The patient is being assessed for  Admission    I agree that at least one RN has performed a thorough Head to Toe Skin Assessment on the patient. ALL assessment sites listed below have been assessed.      Areas assessed by both nurses:    Head, Face, Ears, Shoulders, Back, Chest, Arms, Elbows, Hands, Sacrum. Buttock, Coccyx, Ischium, Legs. Feet and Heels, and Under Medical Devices         Does the Patient have a Wound? No noted wound(s)       Nav Prevention initiated by RN: Yes  Wound Care Orders initiated by RN: No    Pressure Injury (Stage 3,4, Unstageable, DTI, NWPT, and Complex wounds) if present, place Wound referral order by RN under : No    New Ostomies, if present place, Ostomy referral order under : No     Nurse 1 eSignature: Electronically signed by Nikita Whitley RN on 2/13/25 at 1:29 AM EST    **SHARE this note so that the co-signing nurse can place an eSignature**    Nurse 2 eSignature: {Esignature:[id number]}  
CLINICAL PHARMACY NOTE: MEDS TO BEDS    Total # of Prescriptions Filled: 1   The following medications were delivered to the patient:  Midodrine 2.5 mg    Additional Documentation:     Delivered medication to Arri[place] daughter at bedside  
Dental consult sent via perfect serve to Dental attending and ETHAN  
Discussed patient and IR procedure with bedside RN, all questions answered. Will call when able to send for patient.   
Dr. Garrison notified of new consult  
Dr. Rodriguez notified CTA chest resulted.   
HPI:  No complaints     Allergies:   Allergies   Allergen Reactions    Keflex [Cephalexin]     Reglan [Metoclopramide Hcl] Other (See Comments)     Makes me feel \"high\"       Home medications:    Current Facility-Administered Medications   Medication Dose Route Frequency Provider Last Rate Last Admin    oxyCODONE-acetaminophen (PERCOCET) 5-325 MG per tablet 1 tablet  1 tablet Oral Q4H PRN Kenyon Rodriguez MD   1 tablet at 02/17/25 0957    furosemide (LASIX) injection 40 mg  40 mg IntraVENous BID Michael Tiwari MD   40 mg at 02/17/25 0941    methylPREDNISolone sodium succ (SOLU-MEDROL) 60 mg in sterile water 0.96 mL injection  60 mg IntraVENous Q6H Kenyon Rodriguez MD   60 mg at 02/17/25 0940    ipratropium 0.5 mg-albuterol 2.5 mg (DUONEB) nebulizer solution 1 Dose  1 Dose Inhalation Q4H WA RT Kenyon Rodriguez MD   1 Dose at 02/16/25 2001    diclofenac sodium (VOLTAREN) 1 % gel 2 g  2 g Topical 4x Daily PRN Lakshmi Redman APRN - CNP   2 g at 02/16/25 1219    potassium chloride (KLOR-CON M) extended release tablet 20 mEq  20 mEq Oral Daily with breakfast Alvin Bocanegra MD   20 mEq at 02/17/25 0938    potassium chloride (KLOR-CON M) extended release tablet 40 mEq  40 mEq Oral Once Kenyon Rodriguez MD        sulfur hexafluoride microspheres (LUMASON) 60.7-25 MG injection 2 mL  2 mL IntraVENous ONCE PRN Lanny Dukes APRN - CNP        midodrine (PROAMATINE) tablet 2.5 mg  2.5 mg Oral TID  PantLanny jeff APRN - CNP   2.5 mg at 02/17/25 0940    polyethylene glycol (GLYCOLAX) packet 17 g  17 g Oral Daily Yoselin Garrison MD   17 g at 02/16/25 0958    senna (SENOKOT) tablet 8.6 mg  1 tablet Oral Nightly Yoselin Garrison MD   8.6 mg at 02/16/25 2108    bisacodyl (DULCOLAX) EC tablet 10 mg  10 mg Oral Daily Yoselin Garrison MD   10 mg at 02/17/25 0938    sodium chloride flush 0.9 % injection 5-40 mL  5-40 mL IntraVENous 2 times per day Marilyn Ceron APRN - CNP   10 mL at 02/17/25 0941    sodium chloride flush 0.9 % 
Notified Elver that patient's repeat troponin went from 54 to 101.  
Nutrition Note    Type and Reason for Visit: LOS (RD ReScreen Negative)    Nutrition Assessment:  Pt re-screened per LOS protocol.  Chart reviewed.  Pt currently eating ~75% of most meals and w/ no other significant nutritional issues noted at this time.  Will follow-up per policy.  Please consult if RD needed.    Yfn Gonzales RD, LD  Contact: ext [phone]    
Occupational Therapy  OCCUPATIONAL THERAPY INITIAL EVALUATION    [facility]  [address], [place]      Date:2025                                                Patient Name: Liane Booker  MRN: [id number]  : 4/15/1926  Room: [address] [place] [address]-    Evaluating OT: Shyam Nielsen OTR/L #[id number]     Referring Provider: Kenyon Rodriguez MD   Specific Provider Orders/Date: OT eval and treat 25    Diagnosis: Acute exacerbation of chronic heart failure (HCC) [I50.9]  Acute decompensated heart failure (HCC) [I50.9]   Pt admitted to hospital with SOB, nausea    Surgery / Procedure:  thoracentesis       Pertinent Medical History:  has a past medical history of Anxiety, Depression, DJD (degenerative joint disease), Dysphagia, GERD (gastroesophageal reflux disease), Hyperlipidemia, Hypertension, Hypothyroidism, Irritable bowel syndrome with constipation, Low vitamin D level, Osteoarthritis, and Valvular heart disease.       Precautions:  Fall Risk, O2, continuous pulse ox    Assessment of current deficits    [x] Functional mobility  [x]ADLs  [x] Strength               []Cognition    [x] Functional transfers   [x] IADLs         [x] Safety Awareness   [x]Endurance    [] Fine Coordination              [x] Balance      [] Vision/perception   []Sensation     []Gross Motor Coordination  [] ROM  [] Delirium                   [] Motor Control     OT PLAN OF CARE   OT POC based on physician orders, patient diagnosis and results of clinical assessment    Frequency/Duration 1-5 days/wk for 2 weeks PRN   Specific OT Treatment Interventions to include:   * Instruction/training on adapted ADL techniques and AE recommendations to increase functional independence within precautions       * Training on energy conservation strategies, correct breathing pattern and techniques to improve independence/tolerance for self-care routine  * Functional transfer/mobility training/DME 
Occupational Therapy  OT BEDSIDE TREATMENT NOTE   [facility]  [address], [place]      Date:2025  Patient Name: Liane Booker  MRN: [id number]  : 4/15/1926  Room: [address] [place] [address]     Evaluating OT: Shyam Nielsen OTR/L #[id number]      Referring Provider: Kenyon Rodriguez MD   Specific Provider Orders/Date: OT eval and treat 25     Diagnosis: Acute exacerbation of chronic heart failure (HCC) [I50.9]  Acute decompensated heart failure (HCC) [I50.9]   Pt admitted to hospital with SOB, nausea     Surgery / Procedure:  thoracentesis        Pertinent Medical History:  has a past medical history of Anxiety, Depression, DJD (degenerative joint disease), Dysphagia, GERD (gastroesophageal reflux disease), Hyperlipidemia, Hypertension, Hypothyroidism, Irritable bowel syndrome with constipation, Low vitamin D level, Osteoarthritis, and Valvular heart disease.         Precautions:  Fall Risk, O2, continuous pulse ox     Assessment of current deficits    [x] Functional mobility          [x]ADLs           [x] Strength                  []Cognition    [x] Functional transfers        [x] IADLs         [x] Safety Awareness   [x]Endurance    [] Fine Coordination                        [x] Balance      [] Vision/perception   []Sensation      []Gross Motor Coordination            [] ROM           [] Delirium                   [] Motor Control      OT PLAN OF CARE   OT POC based on physician orders, patient diagnosis and results of clinical assessment     Frequency/Duration 1-5 days/wk for 2 weeks PRN   Specific OT Treatment Interventions to include:   * Instruction/training on adapted ADL techniques and AE recommendations to increase functional independence within precautions       * Training on energy conservation strategies, correct breathing pattern and techniques to improve independence/tolerance for self-care routine  * Functional transfer/mobility 
Occupational Therapy  OT SESSION ATTEMPT     Date:2025  Patient Name: Liane Booker  MRN: [id number]  : 4/15/1926  Room: [address] [place] [address]A     Occupational therapy orders received/chart review completed and OT session attempted this date:    [] unavailable due to other medical staff currently with pt   [] on hold, await MRI/ neurosurgical recommendations.   [] on hold per nursing staff secondary to lab / radiology results    [] declined Occupational Therapy  this date due to ___.  Benefits of participation in therapy reviewed with pt.    [x] off unit   [] Other:     Will reattempt OT eval at a later time/date.    Renetta Malagon, OTD, OTR/L [id number]    
PULSE OX ON ROOM AIR SITTING 97%   PULSE OX ON 2 LITERS SITTING RECOVERY 94%   PULSE OX ON ROOM AIR AMBULATING 78%   PULSE OX ON 2 LITERS AMBULATING RECOVERY 91%   
Patient arrived via transport to Radiology department for bilateral thoracentesis. Allergies, home medications, H&P and fasting instructions reviewed with patient. Vital signs taken.  Procedural instructions given, questions answered, understanding expressed and consent signed.   
Patient placed into bedwatch for 1250 to go to dental clinic.    JORGE WARREN RN    
Perfectserved Dr. Rodriguez, he does not want pleural fluid sent for testing.  
Physical Therapy    PT evaluation orders received and chart review completed. Pt off unit at time of attempt.     Will follow and re-attempt as appropriate. Thank you.    Karlene Myers PT, DPT  [id number]      
Physical Therapy  Physical Therapy Initial Assessment     Name: Liane Booker  : 4/15/1926  MRN: [id number]      Date of Service: 2025    Evaluating PT:  Karlene Myers PT, DPT [id number]    Room #:  6420/6420-A  Diagnosis:  Acute exacerbation of chronic heart failure (HCC) [I50.9]  Acute decompensated heart failure (HCC) [I50.9]  PMHx/PSHx:    Past Medical History:   Diagnosis Date    Anxiety     Depression     DJD (degenerative joint disease)     Dysphagia 2014    GERD (gastroesophageal reflux disease) 2014    Hyperlipidemia     Hypertension     Hypothyroidism     Irritable bowel syndrome with constipation     Low vitamin D level     Osteoarthritis     Valvular heart disease       Procedure/Surgery:  thoracentesis   Precautions:  Falls, cognition, O2  Equipment Needs:  TBD    SUBJECTIVE:    Pt reports living alone, however, per chart: Pt lives with daughter in a 1 story home with 3 stairs to enter and ? rail.  Bed is on 1st floor and bath is on 1st floor.  Pt ambulated with WW PTA.    OBJECTIVE:   Initial Evaluation  Date: 25 Treatment Short Term/ Long Term   Goals   AM-PAC 6 Clicks 15/24     Was pt agreeable to Eval/treatment? yes     Does pt have pain? No c/o pain     Bed Mobility  Rolling: Katherine  Supine to sit: Katherine  Sit to supine: Katherine  Scooting: Katherine  Rolling: Independent   Supine to sit: Independent   Sit to supine: Independent   Scooting: Independent    Transfers Sit to stand: Katherine  Stand to sit: Katherine  Stand pivot: NT  Sit to stand: Supervision   Stand to sit: Supervision    Stand pivot: Supervision with WW   Ambulation    Side steps with WW Katherine  >50 feet with WW Supervision    Stair negotiation: ascended and descended  NT  3 steps with 1 rail SBA   ROM BUE:  Defer to OT note  BLE:  WFL     Strength BUE:  Defer to OT note  BLE:  3/5  WFL   Balance Sitting EOB:  SBA  Dynamic Standing:  Katherine with WW  Sitting EOB:  Independent   Dynamic Standing:  Supervision with WW     Pt is A 
Physical Therapy  Treatment    Name: Liane Booker  : 4/15/1926  MRN: [id number]      Date of Service: 2025    Evaluating PT:  Karlene Myers PT, DPT [id number]    Room #:  6420/6420-A  Diagnosis:  Acute exacerbation of chronic heart failure (HCC) [I50.9]  Acute decompensated heart failure (HCC) [I50.9]  PMHx/PSHx:    Past Medical History:   Diagnosis Date    Anxiety     Depression     DJD (degenerative joint disease)     Dysphagia 2014    GERD (gastroesophageal reflux disease) 2014    Hyperlipidemia     Hypertension     Hypothyroidism     Irritable bowel syndrome with constipation     Low vitamin D level     Osteoarthritis     Valvular heart disease       Procedure/Surgery:  thoracentesis   Precautions:  Falls, cognition, O2  Equipment Needs:  TBD    SUBJECTIVE:    Pt reports living alone, however, per chart: Pt lives with daughter in a 1 story home with 3 stairs to enter and ? rail.  Bed is on 1st floor and bath is on 1st floor.  Pt ambulated with WW PTA.    OBJECTIVE:   Initial Evaluation  Date: 25 Treatment Date: 25 Short Term/ Long Term   Goals   AM-PAC 6 Clicks 15/24 17/24    Was pt agreeable to Eval/treatment? yes Yes    Does pt have pain? No c/o pain No complaints of pain    Bed Mobility  Rolling: Katherine  Supine to sit: Katherine  Sit to supine: Katherine  Scooting: Katherine Rolling: NT  Supine to sit: SBA  Sit to supine: NT  Scooting: SBA Rolling: Independent   Supine to sit: Independent   Sit to supine: Independent   Scooting: Independent    Transfers Sit to stand: Katherine  Stand to sit: Katherine  Stand pivot: NT Sit to stand: SBA  Stand to sit: SBA  Stand pivot: SBA with WW Sit to stand: Supervision   Stand to sit: Supervision    Stand pivot: Supervision with WW   Ambulation    Side steps with WW Katherine 15 feet x2 with WW with SBA >50 feet with WW Supervision    Stair negotiation: ascended and descended  NT NT 3 steps with 1 rail SBA   ROM BUE:  Defer to OT note  BLE:  WFL NT    Strength BUE:  
Received a call back from dental clinic and they said they can get the patient in at 8am tomorrow morning. Transport scheduled.  Camila Valdez RN   
Report given to RN. All questions answered. Patient remains stable. Transported back to floor.  
Voicemail left with dental clinic to see when they are able to get the patient in tomorrow for clearance per cardiology request. Awaiting call back.  Camila Valdez RN   
98.1 °F (36.7 °C) (Oral)   Resp 20   Ht 1.6 m (5' 3\")   Wt 65.2 kg (143 lb 12.8 oz)   SpO2 96%   BMI 25.47 kg/m²     General Appearance: alert and oriented to person, place and time and in no acute distress  Skin: warm and dry  Head: normocephalic and atraumatic  Eyes: pupils equal, round, and reactive to light, extraocular eye movements intact, conjunctivae normal  Neck: neck supple and non tender without mass   Pulmonary/Chest: clear to auscultation bilaterally- no wheezes, rales or rhonchi, normal air movement, no respiratory distress  Cardiovascular: normal rate, normal S1 and S2 and no carotid bruits, systolic murmur present on the aortic area radiating towards carotid artery.  Abdomen: soft, non-tender, non-distended, normal bowel sounds, no masses or organomegaly  Extremities: Bilateral pulse palpable, bilateral pitting edema present 1+.  Neurologic: no cranial nerve deficit and speech normal        Recent Labs     02/14/25  0523 02/15/25  0733 02/16/25  0632    135 131*   K 3.3* 4.0 4.7   CL 95* 94* 92*   CO2 30* 31* 30*   BUN 19 16 23   CREATININE 1.1* 1.0 1.1*   GLUCOSE 95 111* 180*   CALCIUM 8.5* 9.0 8.8       Recent Labs     02/14/25  0523 02/15/25  0733 02/16/25  0632   WBC 7.8 8.5 8.6   RBC 3.15* 3.34* 3.19*   HGB 10.0* 10.4* 10.1*   HCT 31.2* 33.0* 31.0*   MCV 99.0 98.8 97.2   MCH 31.7 31.1 31.7   MCHC 32.1 31.5* 32.6   RDW 14.9 14.6 14.3    207 208   MPV 11.6 11.3 11.3       Radiology:     Assessment:    Principal Problem:    Acute exacerbation of chronic heart failure (HCC)  Active Problems:    Hyperlipidemia    Hypothyroidism    Acute decompensated heart failure (HCC)    Valvular heart disease    Depression    Anxiety    Shortness of breath  Resolved Problems:    * No resolved hospital problems. *      Plan:  CHF exacerbation due to chronic systolic heart failure: She have a history of coronary artery disease status post STEMI and coronary disease status post stent,last echo 
Paying Living Expenses: Not hard at all   Food Insecurity: No Food Insecurity (2/13/2025)    Hunger Vital Sign     Worried About Running Out of Food in the Last Year: Never true     Ran Out of Food in the Last Year: Never true   Transportation Needs: No Transportation Needs (2/13/2025)    PRAPARE - Transportation     Lack of Transportation (Medical): No     Lack of Transportation (Non-Medical): No   Physical Activity: Inactive (6/26/2024)    Exercise Vital Sign     Days of Exercise per Week: 0 days     Minutes of Exercise per Session: 0 min   Stress: Not on file   Social Connections: Not on file   Intimate Partner Violence: Not on file   Housing Stability: Low Risk  (2/13/2025)    Housing Stability Vital Sign     Unable to Pay for Housing in the Last Year: No     Number of Times Moved in the Last Year: 1     Homeless in the Last Year: No     Family History   Problem Relation Age of Onset    No Known Problems Mother     No Known Problems Father        Vitals:    02/17/25 2346 02/18/25 0355 02/18/25 0410 02/18/25 0415   BP: 105/65 108/65     Pulse: 81 85     Resp: 18 15     Temp: 97.2 °F (36.2 °C) 97.4 °F (36.3 °C)     TempSrc: Temporal Temporal     SpO2: 98% 100% 94%    Weight:    67.3 kg (148 lb 5.9 oz)   Height:               Intake/Output Summary (Last 24 hours) at 2/18/2025 0732  Last data filed at 2/18/2025 0547  Gross per 24 hour   Intake 150 ml   Output 1000 ml   Net -850 ml         Recent Labs     02/16/25  0632 02/17/25  0647 02/18/25  0501   WBC 8.6 14.7* 10.3   HGB 10.1* 10.6* 9.9*   HCT 31.0* 32.7* 31.0*    278 243      Recent Labs     02/16/25  0632 02/17/25  0647 02/18/25  0501   BUN 23 31* 39*   CREATININE 1.1* 1.4* 1.3*     Recent Labs     02/16/25  0632 02/14/25  0523 02/12/25  0833   PROBNP 24,325* 13,069* 26,029*         Creatinine   Date/Time Value Ref Range Status   02/18/2025 05:01 AM 1.3 (H) 0.50 - 1.00 mg/dL Final   02/17/2025 06:47 AM 1.4 (H) 0.50 - 1.00 mg/dL Final   02/16/2025 06:32 
IntraVENous, BID    methylPREDNISolone, 60 mg, IntraVENous, Q6H    ipratropium 0.5 mg-albuterol 2.5 mg, 1 Dose, Inhalation, Q4H WA RT    potassium chloride, 20 mEq, Oral, Daily with breakfast    potassium chloride, 40 mEq, Oral, Once    midodrine, 2.5 mg, Oral, TID WC    polyethylene glycol, 17 g, Oral, Daily    senna, 1 tablet, Oral, Nightly    bisacodyl, 10 mg, Oral, Daily    sodium chloride flush, 5-40 mL, IntraVENous, 2 times per day    enoxaparin, 30 mg, SubCUTAneous, Daily    atorvastatin, 40 mg, Oral, Daily    buPROPion, 150 mg, Oral, Daily    clopidogrel, 75 mg, Oral, Daily    gabapentin, 300 mg, Oral, BID    levothyroxine, 125 mcg, Oral, Daily    cetirizine, 10 mg, Oral, Daily    metoprolol succinate, 12.5 mg, Oral, Daily    mirtazapine, 30 mg, Oral, Nightly    pantoprazole, 40 mg, Oral, QAM AC    traZODone, 25 mg, Oral, Daily   oxyCODONE-acetaminophen, 1 tablet, Q4H PRN  diclofenac sodium, 2 g, 4x Daily PRN  sulfur hexafluoride microspheres, 2 mL, ONCE PRN  sodium chloride flush, 5-40 mL, PRN  sodium chloride, , PRN  ondansetron, 4 mg, Q8H PRN   Or  ondansetron, 4 mg, Q6H PRN  acetaminophen, 650 mg, Q6H PRN   Or  acetaminophen, 650 mg, Q6H PRN  benzocaine-menthol, 1 lozenge, Q2H PRN  benzonatate, 100 mg, TID PRN  calcium carbonate, 500 mg, TID PRN  hydrALAZINE, 10 mg, Q6H PRN  melatonin, 3 mg, Nightly PRN  Polyvinyl Alcohol-Povidone PF, 1 drop, PRN  sodium chloride, 1 spray, PRN           ROS:   Negative for CP, palpitations, SOB at rest, dizziness/lightheadedness.      Physical Exam   Constitutional: Oriented to person, place, and time. Appears well-developed. No distress.   Cardiovascular: Normal rate, regular rhythm and normal heart sounds positive murmur.    Pulmonary/Chest: Effort normal. No respiratory distress.   Abdominal: Soft. Bowel sounds are normal.   Musculoskeletal: Normal range of motion.   Neurological: alert and oriented to person, place, and time.   Skin: Skin is warm and dry. 
diastolic dysfunction with increased LAP.    Aortic Valve: Severely calcified cusps. Mild regurgitation. Severe stenosis of the aortic valve. AV mean gradient is 41 mmHg. AV peak gradient is 77 mmHg. AV peak velocity is 4.4 m/s. AV area by continuity VTI is 0.5 cm2.    Mitral Valve: Severe annular calcification. Severe regurgitation. No stenosis noted. MV mean gradient is 4 mmHg.    Tricuspid Valve: Mild regurgitation. The estimated RVSP is 57 mmHg.    Left Atrium: Left atrium is moderately dilated.    Image quality is good.    - ECG 2/12/2025:  Sinus rhythm with frequent PVCs, LBBB      - Cardiac cath 9/5/2023:  Coronary Anatomy: Right Dominant     Injection Site(s): Left Main Coronary Artery   LMT: _ The distal LMT.    Additional Comment: Patent LMT including patent stent into LMT.   LAD:   _ The proximal LAD - dissection.    Additional Comment: Patent, large caliber LAD with moderate diffuse disease proximally around areas of prior stenting. Small residual dissection noted distal to LAD stent.   LCX: _ The Circumflex has mild luminal irregularities.    Additional Comment: The left circumflex is a large caliber vessel that gives rise  t the obtuse marginal branches without significant obstructive CAD.     RAMUS: _ The Ramus is Absent.     RCA:   _ RCA Status: Not Applicable.       -PCI 8/30/2023:  Procedures Performed:   1. IVUS guided PCI of the proximal/mid LAD with a 3.5 x 28 mm Xience Skypoint ARCENIO;  post-dilated with a 3.5 x 12 mm NC balloon [distally] and 5.5 x 8 mm NC balloon   [proximally - inflated at 12 JOANIE]     2. IVUS guided PCI of the LMT into the LAD with a 3.5 x 33 Xience Skypoint ARCENIO; post-dilated with a 3.5 x 15 mm Takeru NC [distally- LAD] and 5.0 x 8 mm NC balloon [proximally- LMT]     3. Attempted aspiration thrombectomy of the second diagonal branch with pronto V4 system     4. PTCA of the second diagonal branch with a 1.5 x 15 mm Takeru SC balloon [distally] and 2.0 x 12 mm SC balloon [ 
non-tender, non-distended, normal bowel sounds, no masses or organomegaly  Extremities: no cyanosis, no clubbing and no edema  Neurologic: no cranial nerve deficit and speech normal        Recent Labs     02/16/25  0632 02/17/25  0647 02/18/25  0501   * 133 139   K 4.7 4.9 4.6   CL 92* 93* 96*   CO2 30* 28 33*   BUN 23 31* 39*   CREATININE 1.1* 1.4* 1.3*   GLUCOSE 180* 160* 120*   CALCIUM 8.8 9.3 9.0       Recent Labs     02/16/25  0632 02/17/25  0647 02/18/25  0501   WBC 8.6 14.7* 10.3   RBC 3.19* 3.34* 3.16*   HGB 10.1* 10.6* 9.9*   HCT 31.0* 32.7* 31.0*   MCV 97.2 97.9 98.1   MCH 31.7 31.7 31.3   MCHC 32.6 32.4 31.9*   RDW 14.3 14.3 14.2    278 243   MPV 11.3 11.2 11.0         Assessment:    Acute respiratory insufficiency 2/2 pleural effusions 2/2 severe aortic stenosis  CAD with PCI 2023  Hypothyroidism  HLD  Depression/anxiety      Plan:  CTS and Dr. Tiwari (cardiology) following:  S/p left thoracentesis 2/17  For right thoracentesis today  Remains on Lasix 40 mg IV twice daily  TAVR on 2/26  Continue other medications including Lipitor, bowel regimen, Wellbutrin, Xarelto, Plavix, Neurontin, breathing treatments, Synthroid, Toprol-XL, midodrine, Remeron, PPI, trazodone      DC planning: Home possibly tomorrow    NOTE: This report was transcribed using voice recognition software. Every effort was made to ensure accuracy; however, inadvertent computerized transcription errors may be present.    Electronically signed by Asad Moon MD on 2/18/2025 at 12:28 PM      
time and in no acute distress  Skin: warm and dry  Head: normocephalic and atraumatic  Eyes: pupils equal, round, and reactive to light, extraocular eye movements intact, conjunctivae normal  Neck: neck supple and non tender without mass   Pulmonary/Chest: clear to auscultation bilaterally- no wheezes, rales or rhonchi, normal air movement, no respiratory distress  Cardiovascular: normal rate, normal S1 and S2 and no carotid bruits  Abdomen: soft, non-tender, non-distended, normal bowel sounds, no masses or organomegaly  Extremities: no cyanosis, no clubbing and no edema  Neurologic: no cranial nerve deficit and speech normal        Recent Labs     02/18/25  0501 02/20/25  0322    138   K 4.6 4.1   CL 96* 91*   CO2 33* 35*   BUN 39* 35*   CREATININE 1.3* 1.1*   GLUCOSE 120* 136*   CALCIUM 9.0 9.0       Recent Labs     02/18/25  0501 02/19/25  1926   WBC 10.3 11.1   RBC 3.16* 3.45*   HGB 9.9* 10.8*   HCT 31.0* 34.1   MCV 98.1 98.8   MCH 31.3 31.3   MCHC 31.9* 31.7*   RDW 14.2 14.3    251   MPV 11.0 11.4         Assessment:    Acute respiratory insufficiency 2/2 pleural effusions 2/2 severe aortic stenosis  CAD with PCI 2023  Hypothyroidism  HLD  Depression/anxiety      Plan:  CTS and Dr. Tiwari (cardiology) following:  S/p left thoracentesis 2/17  For right thoracentesis 2/18  Lasix changed to PO  TAVR on 2/26  Started on heparin drip by cardiology on 2/19  Continue other medications including Lipitor, bowel regimen, Wellbutrin, Xarelto, Plavix, Neurontin, breathing treatments, Synthroid, Toprol-XL, midodrine, Remeron, PPI, trazodone      DC planning: Awaiting cardiology clearance and recommendations on heparin drip started last night    NOTE: This report was transcribed using voice recognition software. Every effort was made to ensure accuracy; however, inadvertent computerized transcription errors may be present.    Electronically signed by Asad Moon MD on 2/20/2025 at 12:07 PM      
marginal branches without significant obstructive CAD.     RAMUS: _ The Ramus is Absent.     RCA:   _ RCA Status: Not Applicable.          had [facility] - s/p IVUS Guided PCI of the mid LAD dissection with a 3.5 x 12 mm Synergy XD EES - On DAPT, BB, Statin      Left bundle branch block  Hypertension  Valvular heart disease        2D echo September 5, 2023 [facility]  EF 37%     Exam indication: Known severe AS w/new SOB   - There is a resting wall motion abnormality in the territory of the LAD.   - The left ventricle is normal in size. Left ventricular systolic function is   moderately decreased. EF = 37 ± 5% (2D biplane) Left ventricular diastolic   function was not evaluated due to severe mitral annular calcification.   - The left atrial cavity is moderately dilated.   - Tricuspid aortic valve. There is low flow, low gradient, low EF, severe aortic   valve stenosis caused by calcified valve and restricted opening. AV area is 0.67   cm² (0.38 cm²/m²) by continuity, VTI. The peak gradient is 52 mmHg, the mean   gradient is 31 mmHg and the dimensionless valve index is 0.21. Previous mitral   peak/mean gradients were 53/29 mmHg.   - Exam was compared with the prior CC echocardiographic exam performed on   7/24/2023. AV gradients slightly worse.      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   Chronic left bundle branch block  Hyperlipidemia  Hypothyroidism HRT  Hypotension secondary to combination of LV dysfunction and severe aortic stenosis>> stable

## 2025-02-20 NOTE — PLAN OF CARE
Problem: Chronic Conditions and Co-morbidities  Goal: Patient's chronic conditions and co-morbidity symptoms are monitored and maintained or improved  Recent Flowsheet Documentation  Taken 2/20/2025 0755 by Macrina Payton RN  Care Plan - Patient's Chronic Conditions and Co-Morbidity Symptoms are Monitored and Maintained or Improved: Monitor and assess patient's chronic conditions and comorbid symptoms for stability, deterioration, or improvement     Problem: Discharge Planning  Goal: Discharge to home or other facility with appropriate resources  Recent Flowsheet Documentation  Taken 2/20/2025 0755 by Macrina Payton RN  Discharge to home or other facility with appropriate resources: Identify barriers to discharge with patient and caregiver     Problem: Skin/Tissue Integrity  Goal: Skin integrity remains intact  Description: 1.  Monitor for areas of redness and/or skin breakdown  2.  Assess vascular access sites hourly  3.  Every 4-6 hours minimum:  Change oxygen saturation probe site  4.  Every 4-6 hours:  If on nasal continuous positive airway pressure, respiratory therapy assess nares and determine need for appliance change or resting period  Recent Flowsheet Documentation  Taken 2/20/2025 0755 by Macrina Payton RN  Skin Integrity Remains Intact: Monitor for areas of redness and/or skin breakdown

## 2025-02-21 ENCOUNTER — CARE COORDINATION (OUTPATIENT)
Dept: CARE COORDINATION | Age: 89
End: 2025-02-21

## 2025-02-21 DIAGNOSIS — I35.0 NODULAR CALCIFIC AORTIC VALVE STENOSIS: Primary | ICD-10-CM

## 2025-02-21 PROCEDURE — 1111F DSCHRG MED/CURRENT MED MERGE: CPT | Performed by: INTERNAL MEDICINE

## 2025-02-21 NOTE — CARE COORDINATION
Care Transitions Note    Initial Call - Call within 2 business days of discharge: Yes    Patient Current Location:  Home: [address] [place] [address].  [facility] [id number]    Care Transition Nurse contacted the family, Daughter Ben  by telephone to perform post hospital discharge assessment, verified name and  as identifiers. Provided introduction to self, and explanation of the Care Transition Nurse role.     Patient: Liane Booker    Patient : 4/15/1926   MRN: [id number]    Reason for Admission: Acute CHF exacerbation  Discharge Date: 25  RURS: Readmission Risk Score: 16.1      Last Discharge Facility       Date Complaint Diagnosis Description Type Department Provider    25 Nausea Shortness of breath ... ED to Hosp-Admission (Discharged) (ADMITTED) [place] [facility] [place]ad Harris MD; Mingo Jacobs...            Was this an external facility discharge? No    Additional needs identified to be addressed with provider   Standard priority: needs HFU appt, DME 02 delivery refills, transfer inhaler to [facility]             Method of communication with provider: phone.    Patients top risk factors for readmission: medical condition-advanced age, CHF, aortic valve stenosis, pleural effusion    Interventions to address risk factors:   Review of patient management of conditions/medications: reviewed medications appt needs, UTI symptoms  Home Health: [facility] verified   DME: Needs refill on 02 tanks. Has raised toilet seat, bedside commode, walker  Transferred inhaler to [facility]    Care Summary Note:     Patient was admitted to [facility] on 25. Past medical history of anxiety, depression, GERD, HLD, HTN, hypothyroidism, dysphagia, valvular heart disease, and OA who presents to the ED with nausea and a general feeling of unwell. Patient's symptoms started after she had a nerve block procedure for her neck.    Patient states she has been experiencing increased bilateral lower extremity swelling as well as orthopnea.

## 2025-02-21 NOTE — PROGRESS NOTES
[facility]   PRE-ADMISSION TESTING GENERAL INSTRUCTIONS  PAT Phone Number: [phone]      GENERAL INSTRUCTIONS:    [x] Hibiclens shower the night before AND the morning of surgery.   -Do not use Hibiclens on your face or head.  [x] Do not wear makeup, lotions, powders, deodorant the morning of surgery.  [x] Nothing to eat or drink after midnight. This includes no gum, candy, mints or water.  [x] You may brush your teeth, gargle, but do NOT swallow water.   [x] No tobacco products, illegal drugs, or alcohol within 24 hours of your surgery.  [x] Jewelry or valuables should not be brought to the hospital. All body and/or tongue piercing's must be removed prior to arriving to hospital. No contact lens or hair pins.   [x] Arrange transportation with a responsible adult  to and from the hospital.   [x] Bring insurance card and photo ID.  [x] Bring copy of living will or healthcare power of  papers to be placed in your electronic record.  [x] Transfusion (Green) Bracelet: Please bring with you to hospital, day of surgery.     PARKING INSTRUCTIONS:     [x] ARRIVAL DATE & TIME:   WEDNESDAY 2/26  @  0700 AM  [x] Times are subject to change. We will contact you the business day before surgery if that were to occur.  [x] Enter into the [facility] Entrance. Two people may accompany you. Masks are not required.  [x] Parking Lot \"I\" is where you will park. It is located on the corner of [address] and [address]. The entrance is on [address].   Only one vehicle - per patient, is permitted in parking lot.   Upon entering the parking lot, a voucher ticket will print.    EDUCATION INSTRUCTIONS:           [x] Pre-admission Testing educational folder given  [x] Incentive Spirometry,coughing & deep breathing exercises reviewed.   [x] Fluoroscopy-Xray used in surgery reviewed with patient. Educational pamphlet placed in chart.  [x] Pain: Post-op pain is normal and to be

## 2025-02-22 LAB
EKG ATRIAL RATE: 84 BPM
EKG P AXIS: 53 DEGREES
EKG P-R INTERVAL: 144 MS
EKG Q-T INTERVAL: 418 MS
EKG QRS DURATION: 136 MS
EKG QTC CALCULATION (BAZETT): 493 MS
EKG R AXIS: 70 DEGREES
EKG T AXIS: -88 DEGREES
EKG VENTRICULAR RATE: 84 BPM
MICROORGANISM SPEC CULT: ABNORMAL
MICROORGANISM SPEC CULT: ABNORMAL
MICROORGANISM SPEC CULT: NORMAL
SERVICE CMNT-IMP: ABNORMAL
SERVICE CMNT-IMP: NORMAL
SPECIMEN DESCRIPTION: ABNORMAL
SPECIMEN DESCRIPTION: NORMAL

## 2025-02-24 ENCOUNTER — HOSPITAL ENCOUNTER (OUTPATIENT)
Dept: PREADMISSION TESTING | Age: 89
Discharge: HOME | DRG: 267 | End: 2025-02-24
Payer: MEDICARE

## 2025-02-24 ENCOUNTER — CARE COORDINATION (OUTPATIENT)
Dept: CARE COORDINATION | Age: 89
End: 2025-02-24

## 2025-02-24 VITALS
RESPIRATION RATE: 20 BRPM | TEMPERATURE: 97.5 F | OXYGEN SATURATION: 99 % | BODY MASS INDEX: 24.45 KG/M2 | DIASTOLIC BLOOD PRESSURE: 54 MMHG | WEIGHT: 138 LBS | HEART RATE: 71 BPM | HEIGHT: 63 IN | SYSTOLIC BLOOD PRESSURE: 92 MMHG

## 2025-02-24 DIAGNOSIS — I35.0 NONRHEUMATIC AORTIC VALVE STENOSIS: ICD-10-CM

## 2025-02-24 LAB
ALBUMIN SERPL-MCNC: 3.2 G/DL (ref 3.5–5.2)
ALP SERPL-CCNC: 67 U/L (ref 35–104)
ALT SERPL-CCNC: 40 U/L (ref 0–32)
ANION GAP SERPL CALCULATED.3IONS-SCNC: 7 MMOL/L (ref 7–16)
AST SERPL-CCNC: 24 U/L (ref 0–31)
BILIRUB SERPL-MCNC: 0.2 MG/DL (ref 0–1.2)
BUN SERPL-MCNC: 29 MG/DL (ref 6–23)
CALCIUM SERPL-MCNC: 8.7 MG/DL (ref 8.6–10.2)
CHLORIDE SERPL-SCNC: 91 MMOL/L (ref 98–107)
CO2 SERPL-SCNC: 35 MMOL/L (ref 22–29)
CREAT SERPL-MCNC: 1.3 MG/DL (ref 0.5–1)
ERYTHROCYTE [DISTWIDTH] IN BLOOD BY AUTOMATED COUNT: 13.7 % (ref 11.5–15)
GFR, ESTIMATED: 37 ML/MIN/1.73M2
GLUCOSE SERPL-MCNC: 115 MG/DL (ref 74–99)
HBA1C MFR BLD: 6 % (ref 4–5.6)
HCT VFR BLD AUTO: 30.9 % (ref 34–48)
HGB BLD-MCNC: 9.9 G/DL (ref 11.5–15.5)
INR PPP: 1
MCH RBC QN AUTO: 31.2 PG (ref 26–35)
MCHC RBC AUTO-ENTMCNC: 32 G/DL (ref 32–34.5)
MCV RBC AUTO: 97.5 FL (ref 80–99.9)
PARTIAL THROMBOPLASTIN TIME: 29.2 SEC (ref 24.5–35.1)
PLATELET # BLD AUTO: 275 K/UL (ref 130–450)
PMV BLD AUTO: 10.3 FL (ref 7–12)
POTASSIUM SERPL-SCNC: 4.1 MMOL/L (ref 3.5–5)
PROT SERPL-MCNC: 5.8 G/DL (ref 6.4–8.3)
PROTHROMBIN TIME: 11 SEC (ref 9.3–12.4)
RBC # BLD AUTO: 3.17 M/UL (ref 3.5–5.5)
SODIUM SERPL-SCNC: 133 MMOL/L (ref 132–146)
T4 FREE SERPL-MCNC: 1.5 NG/DL (ref 0.9–1.7)
TSH SERPL DL<=0.05 MIU/L-ACNC: 2.96 UIU/ML (ref 0.27–4.2)
WBC OTHER # BLD: 8.5 K/UL (ref 4.5–11.5)

## 2025-02-24 PROCEDURE — 86901 BLOOD TYPING SEROLOGIC RH(D): CPT

## 2025-02-24 PROCEDURE — 86850 RBC ANTIBODY SCREEN: CPT

## 2025-02-24 PROCEDURE — 86900 BLOOD TYPING SEROLOGIC ABO: CPT

## 2025-02-24 PROCEDURE — 83036 HEMOGLOBIN GLYCOSYLATED A1C: CPT

## 2025-02-24 PROCEDURE — 36415 COLL VENOUS BLD VENIPUNCTURE: CPT

## 2025-02-24 PROCEDURE — 85027 COMPLETE CBC AUTOMATED: CPT

## 2025-02-24 PROCEDURE — 86923 COMPATIBILITY TEST ELECTRIC: CPT

## 2025-02-24 PROCEDURE — 85730 THROMBOPLASTIN TIME PARTIAL: CPT

## 2025-02-24 PROCEDURE — 84443 ASSAY THYROID STIM HORMONE: CPT

## 2025-02-24 PROCEDURE — 80053 COMPREHEN METABOLIC PANEL: CPT

## 2025-02-24 PROCEDURE — 84439 ASSAY OF FREE THYROXINE: CPT

## 2025-02-24 PROCEDURE — 85610 PROTHROMBIN TIME: CPT

## 2025-02-24 ASSESSMENT — KANSAS CITY CARDIOMYOPATHY QUESTIONNAIRE (KCCQ12)
2. OVER THE PAST 2 WEEKS, HOW MANY TIMES DID YOU HAVE SWELLING IN YOUR FEET, ANKLES OR LEGS WHEN YOU WOKE UP IN THE MORNING: EVERY MORNING
8C. OVER THE PAST 2 WEEKS, ON AVERAGE, HOW HAS HEART FAILURE LIMITED YOU ABILITY TO VISIT FAMILY AND FRIENDS OUR OF YOUR HOME: DOES NOT APPLY OR DID NOT DO FOR OTHER REASONS
1B. OVER THE PAST 2 WEEKS, HOW MUCH WERE YOU LIMITED BY HEART FAILURE SYMPTOMS (SHORTNESS OF BREATH OR FATIGUE) WHEN WALKING 1 BLOCK ON LEVEL GROUND: LIMITED FOR OTHER REASONS OR DID NOT DO THE ACTIVITY
8A. OVER THE PAST 2 WEEKS, ON AVERAGE, HOW HAS HEART FAILURE LIMITED YOU ABILITY TO DO HOBBIES OR RECREATIONAL ACTIVITIES: DOES NOT APPLY OR DID NOT DO FOR OTHER REASONS
8B. OVER THE PAST 2 WEEKS, ON AVERAGE, HOW HAS HEART FAILURE LIMITED YOU ABILITY TO WORK OR DO HOUSEHOLD CHORES: DOES NOT APPLY OR DID NOT DO FOR OTHER REASONS
1A. OVER THE PAST 2 WEEKS, HOW MUCH WERE YOU LIMITED BY HEART FAILURE SYMPTOMS (SHORTNESS OF BREATH OR FATIGUE) WHEN SHOWERING OR BATHING: NOT AT ALL LIMITED
4. OVER THE PAST 2 WEEKS, ON AVERAGE, HOW MANY TIMES HAS SHORTNESS OF BREATH LIMITED YOUR ABILITY TO DO WHAT YOU WANTED: SEVERAL TIMES PER DAY
7. IF YOU HAD TO SPEND THE REST OF YOUR LIFE WITH YOUR HEART FAILURE THE WAY IT IS RIGHT NOW, HOW WOULD YOU FEEL ABOUT THIS?: NOT AT ALL SATISFIED
6. OVER THE PAST 2 WEEKS, HOW MUCH HAS YOUR HEART FAILURE LIMITED YOUR ENJOYMENT OF LIFE: IT HAS LIMITED MY ENJOYMENT OF LIFE QUITE A BIT
1C. OVER THE PAST 2 WEEKS, HOW MUCH WERE YOU LIMITED BY HEART FAILURE SYMPTOMS (SHORTNESS OF BREATH OR FATIGUE) WHEN HURRYING OR JOGGING (AS IF TO CATCH A BUS): LIMITED FOR OTHER REASONS OR DID NOT DO THE ACTIVITY
3. OVER THE PAST 2 WEEKS, ON AVERAGE, HOW MANY TIMES HAS FATIGUE LIMITED YOUR ABILITY TO DO WHAT YOU WANTED: AT LEAST ONCE A DAY
5. OVER THE PAST 2 WEEKS, ON AVERAGE, HOW MANY TIMES HAVE YOU BEEN FORCED TO SLEEP SITTING UP IN A CHAIR OR WITH AT LEAST 3 PILLOWS TO PROP YOU UP BECAUSE OF SHORTNESS OF BREATH?: 3 OR MORE TIMES PER WEEK BUT NOT EVERY DAY

## 2025-02-24 NOTE — PROGRESS NOTES
Blood pressure right upper arm, automatic, sitting 91/55, heart rate 74 (monitor), MAP 67; Blood pressure left upper arm, automatic, sitting 92/54, heart rate 71 (monitor), MAP 67.  MINNIE Astudillo notified of blood pressure readings.

## 2025-02-24 NOTE — H&P
STRUCTURAL CARDIOLOGY INPATIENT HISTORY AND PHYSICAL EXAMINATION  NOTE       PATIENT DEMOGRAPHICS:       NAME: Liane Booker   YOB: 1926   AGE: 89 y.o.   MEDICAL RECORD NUMBER: [id number]           ADMISSION INFORMATION:  DATE OF ADMISSION: 2/26/2025     PRINCIPLE DIAGNOSIS: Severe aortic stenosis         CARE TEAM MEMBERS:   PCP : Linda Santiago MD     PRIMARY CARDIOLOGIST: Dr. Salgado    REFERRING PROVIDER: Dr. Ceron            HISTORY OF PRESENT ILLNESS:    Liane Booker is a 89 y.o. female referred by Dr. Ceron who presents today for planned Transcatheter Aortic Valve Replacement (TAVR). Past medical history of STEMI s/p PCI LAD ([facility], 8/2023), PCI LM/LAD s/p LAD dissection ([facility], 9/2023), HTN, HLD, HFrEF, GERD/esophageal stricture s/p dilatation, hiatal hernia, LBBB     She was seen in consultation while inpatient last week. She presented with complaints of dyspnea, orthopnea and LE edema. She denied any chest pain. Echo showed severe LV dysfunction with severe (critical) aortic stenosis. She had previously undergone work up for TAVR at [facility] but it was never completed for unclear reasons. TAVR CTAs were obtained. LHC was deferred.     She underwent diuresis, bilateral thoracentesis and optimization of GDMT before being discharged on 2/20/25. She presents today for TAVR procedure.           There are no interval changes in history of present illness. There has been no changes to medications, and no interval admissions to the hospital since last clinical encounter.            PAST HISTORY:   Past Medical History:   Diagnosis Date    Anxiety     Depression     DJD (degenerative joint disease)     Dysphagia 02/20/2014    GERD (gastroesophageal reflux disease) 02/20/2014    Hyperlipidemia     Hypertension     Hypothyroidism     Irritable bowel syndrome with constipation     Low vitamin D level     Osteoarthritis     Valvular heart disease        Past Surgical History:   Procedure

## 2025-02-24 NOTE — CARE COORDINATION
Care Transitions Note    Follow Up Call     Patient Current Location:  Home: [facility] [address].  [facility] [address]    Care Transition Nurse contacted the family, daughter Ben  by telephone. Verified name and  as identifiers.    Additional needs identified to be addressed with provider   No needs identified                 Method of communication with provider: none.    Care Summary Note: Patient's daughter called on , left VM, stated pt's daughter that she lives with has COVID and pt is to have TAVR on , has labs scheduled for . Daughter asking if procedure needs to be rescheduled.     Spoke to andreina Contreras today. Stated she and patient tested COVID negative, denies any symptoms of COVID. Pt's daughter that she lives with is staying isolated in her room. Advised to proceed with scheduled labs today as long as pt is not having any symptoms.     Plan of care updates since last contact:  Review of patient management of conditions/medications: Labs will be completed today for planned TAVR on        Advance Care Planning:   Does patient have an Advance Directive: reviewed and current.    Medication Review:  No changes since last call.     Remote Patient Monitoring:  Offered patient enrollment in the Remote Patient Monitoring (RPM) program for in-home monitoring: Deferred at this time because surgery on ; will discuss at next outreach.    Assessments:  Care Transitions Subsequent and Final Call    Subsequent and Final Calls  Do you have any ongoing symptoms?: No  Have your medications changed?: No  Do you have any questions related to your medications?: No  Do you currently have any active services?: No  Do you have any needs or concerns that I can assist you with?: No  Identified Barriers: Other  Care Transitions Interventions  Other Interventions:              Follow Up Appointment:   Reviewed upcoming appointment(s).  Future Appointments         Provider Specialty Dept Phone    2025

## 2025-02-25 ENCOUNTER — ANESTHESIA EVENT (OUTPATIENT)
Dept: OPERATING ROOM | Age: 89
DRG: 267 | End: 2025-02-25
Payer: MEDICARE

## 2025-02-26 ENCOUNTER — APPOINTMENT (OUTPATIENT)
Age: 89
DRG: 267 | End: 2025-02-26
Attending: INTERNAL MEDICINE
Payer: MEDICARE

## 2025-02-26 ENCOUNTER — ANESTHESIA (OUTPATIENT)
Dept: OPERATING ROOM | Age: 89
DRG: 267 | End: 2025-02-26
Payer: MEDICARE

## 2025-02-26 ENCOUNTER — HOSPITAL ENCOUNTER (INPATIENT)
Age: 89
LOS: 1 days | Discharge: HOME | DRG: 267 | End: 2025-02-27
Attending: INTERNAL MEDICINE | Admitting: INTERNAL MEDICINE
Payer: MEDICARE

## 2025-02-26 DIAGNOSIS — I35.0 NODULAR CALCIFIC AORTIC VALVE STENOSIS: ICD-10-CM

## 2025-02-26 DIAGNOSIS — I35.0 NONRHEUMATIC AORTIC VALVE STENOSIS: ICD-10-CM

## 2025-02-26 DIAGNOSIS — Z95.2 HISTORY OF TRANSCATHETER AORTIC VALVE REPLACEMENT (TAVR): ICD-10-CM

## 2025-02-26 LAB
ABO/RH: NORMAL
ANION GAP SERPL CALCULATED.3IONS-SCNC: 5 MMOL/L (ref 7–16)
ANTIBODY SCREEN: NEGATIVE
ARM BAND NUMBER: NORMAL
BLOOD BANK DISPENSE STATUS: NORMAL
BLOOD BANK SAMPLE EXPIRATION: NORMAL
BPU ID: NORMAL
BUN SERPL-MCNC: 22 MG/DL (ref 6–23)
CALCIUM SERPL-MCNC: 8 MG/DL (ref 8.6–10.2)
CHLORIDE SERPL-SCNC: 97 MMOL/L (ref 98–107)
CO2 SERPL-SCNC: 33 MMOL/L (ref 22–29)
COMPONENT: NORMAL
CREAT SERPL-MCNC: 1 MG/DL (ref 0.5–1)
CROSSMATCH RESULT: NORMAL
EKG ATRIAL RATE: 53 BPM
EKG ATRIAL RATE: 79 BPM
EKG P AXIS: 50 DEGREES
EKG P AXIS: 59 DEGREES
EKG P-R INTERVAL: 152 MS
EKG P-R INTERVAL: 162 MS
EKG Q-T INTERVAL: 428 MS
EKG Q-T INTERVAL: 568 MS
EKG QRS DURATION: 138 MS
EKG QRS DURATION: 144 MS
EKG QTC CALCULATION (BAZETT): 490 MS
EKG QTC CALCULATION (BAZETT): 532 MS
EKG R AXIS: 41 DEGREES
EKG R AXIS: 5 DEGREES
EKG T AXIS: 150 DEGREES
EKG T AXIS: 94 DEGREES
EKG VENTRICULAR RATE: 53 BPM
EKG VENTRICULAR RATE: 79 BPM
ERYTHROCYTE [DISTWIDTH] IN BLOOD BY AUTOMATED COUNT: 14 % (ref 11.5–15)
GFR, ESTIMATED: 49 ML/MIN/1.73M2
GLUCOSE BLD-MCNC: 101 MG/DL (ref 74–99)
GLUCOSE BLD-MCNC: 105 MG/DL (ref 74–99)
GLUCOSE BLD-MCNC: 111 MG/DL (ref 74–99)
GLUCOSE BLD-MCNC: 148 MG/DL (ref 74–99)
GLUCOSE SERPL-MCNC: 122 MG/DL (ref 74–99)
HCT VFR BLD AUTO: 27.2 % (ref 34–48)
HGB BLD-MCNC: 8.5 G/DL (ref 11.5–15.5)
INR PPP: 1.2
MAGNESIUM SERPL-MCNC: 2 MG/DL (ref 1.6–2.6)
MCH RBC QN AUTO: 30.9 PG (ref 26–35)
MCHC RBC AUTO-ENTMCNC: 31.3 G/DL (ref 32–34.5)
MCV RBC AUTO: 98.9 FL (ref 80–99.9)
PLATELET # BLD AUTO: 200 K/UL (ref 130–450)
PMV BLD AUTO: 10.5 FL (ref 7–12)
POTASSIUM SERPL-SCNC: 3.8 MMOL/L (ref 3.5–5)
POTASSIUM SERPL-SCNC: 4 MMOL/L (ref 3.5–5)
PROTHROMBIN TIME: 13 SEC (ref 9.3–12.4)
RBC # BLD AUTO: 2.75 M/UL (ref 3.5–5.5)
SODIUM SERPL-SCNC: 135 MMOL/L (ref 132–146)
TRANSFUSION STATUS: NORMAL
UNIT DIVISION: 0
WBC OTHER # BLD: 8.8 K/UL (ref 4.5–11.5)

## 2025-02-26 PROCEDURE — 84132 ASSAY OF SERUM POTASSIUM: CPT

## 2025-02-26 PROCEDURE — 6370000000 HC RX 637 (ALT 250 FOR IP): Performed by: PHYSICIAN ASSISTANT

## 2025-02-26 PROCEDURE — 2500000003 HC RX 250 WO HCPCS: Performed by: NURSE ANESTHETIST, CERTIFIED REGISTERED

## 2025-02-26 PROCEDURE — 85347 COAGULATION TIME ACTIVATED: CPT

## 2025-02-26 PROCEDURE — C1760 CLOSURE DEV, VASC: HCPCS | Performed by: INTERNAL MEDICINE

## 2025-02-26 PROCEDURE — 33361 REPLACE AORTIC VALVE PERQ: CPT | Performed by: THORACIC SURGERY (CARDIOTHORACIC VASCULAR SURGERY)

## 2025-02-26 PROCEDURE — 80048 BASIC METABOLIC PNL TOTAL CA: CPT

## 2025-02-26 PROCEDURE — C1894 INTRO/SHEATH, NON-LASER: HCPCS | Performed by: INTERNAL MEDICINE

## 2025-02-26 PROCEDURE — 3700000000 HC ANESTHESIA ATTENDED CARE: Performed by: INTERNAL MEDICINE

## 2025-02-26 PROCEDURE — 7100000001 HC PACU RECOVERY - ADDTL 15 MIN

## 2025-02-26 PROCEDURE — 83735 ASSAY OF MAGNESIUM: CPT

## 2025-02-26 PROCEDURE — 3600000017 HC SURGERY HYBRID ADDL 15MIN: Performed by: INTERNAL MEDICINE

## 2025-02-26 PROCEDURE — 6360000004 HC RX CONTRAST MEDICATION: Performed by: INTERNAL MEDICINE

## 2025-02-26 PROCEDURE — 85027 COMPLETE CBC AUTOMATED: CPT

## 2025-02-26 PROCEDURE — 7100000000 HC PACU RECOVERY - FIRST 15 MIN

## 2025-02-26 PROCEDURE — 85610 PROTHROMBIN TIME: CPT

## 2025-02-26 PROCEDURE — 37799 UNLISTED PX VASCULAR SURGERY: CPT

## 2025-02-26 PROCEDURE — 6360000002 HC RX W HCPCS: Performed by: PHYSICIAN ASSISTANT

## 2025-02-26 PROCEDURE — 6360000002 HC RX W HCPCS: Performed by: INTERNAL MEDICINE

## 2025-02-26 PROCEDURE — 2580000003 HC RX 258: Performed by: PHYSICIAN ASSISTANT

## 2025-02-26 PROCEDURE — 2140000000 HC CCU INTERMEDIATE R&B

## 2025-02-26 PROCEDURE — 3600000007 HC SURGERY HYBRID BASE: Performed by: INTERNAL MEDICINE

## 2025-02-26 PROCEDURE — 2580000003 HC RX 258: Performed by: NURSE ANESTHETIST, CERTIFIED REGISTERED

## 2025-02-26 PROCEDURE — 2500000003 HC RX 250 WO HCPCS: Performed by: PHYSICIAN ASSISTANT

## 2025-02-26 PROCEDURE — C1769 GUIDE WIRE: HCPCS | Performed by: INTERNAL MEDICINE

## 2025-02-26 PROCEDURE — 6360000002 HC RX W HCPCS: Performed by: NURSE ANESTHETIST, CERTIFIED REGISTERED

## 2025-02-26 PROCEDURE — 2720000010 HC SURG SUPPLY STERILE: Performed by: INTERNAL MEDICINE

## 2025-02-26 PROCEDURE — 3700000001 HC ADD 15 MINUTES (ANESTHESIA): Performed by: INTERNAL MEDICINE

## 2025-02-26 PROCEDURE — 02RF38Z REPLACEMENT OF AORTIC VALVE WITH ZOOPLASTIC TISSUE, PERCUTANEOUS APPROACH: ICD-10-PCS | Performed by: THORACIC SURGERY (CARDIOTHORACIC VASCULAR SURGERY)

## 2025-02-26 PROCEDURE — 6370000000 HC RX 637 (ALT 250 FOR IP): Performed by: INTERNAL MEDICINE

## 2025-02-26 PROCEDURE — 93005 ELECTROCARDIOGRAM TRACING: CPT | Performed by: PHYSICIAN ASSISTANT

## 2025-02-26 PROCEDURE — C1889 IMPLANT/INSERT DEVICE, NOC: HCPCS | Performed by: INTERNAL MEDICINE

## 2025-02-26 PROCEDURE — C1725 CATH, TRANSLUMIN NON-LASER: HCPCS | Performed by: INTERNAL MEDICINE

## 2025-02-26 PROCEDURE — 93321 DOPPLER ECHO F-UP/LMTD STD: CPT

## 2025-02-26 PROCEDURE — 2500000003 HC RX 250 WO HCPCS: Performed by: INTERNAL MEDICINE

## 2025-02-26 PROCEDURE — 82962 GLUCOSE BLOOD TEST: CPT

## 2025-02-26 PROCEDURE — B3101ZZ FLUOROSCOPY OF THORACIC AORTA USING LOW OSMOLAR CONTRAST: ICD-10-PCS | Performed by: THORACIC SURGERY (CARDIOTHORACIC VASCULAR SURGERY)

## 2025-02-26 PROCEDURE — 2700000000 HC OXYGEN THERAPY PER DAY

## 2025-02-26 PROCEDURE — 5A1223Z PERFORMANCE OF CARDIAC PACING, CONTINUOUS: ICD-10-PCS | Performed by: THORACIC SURGERY (CARDIOTHORACIC VASCULAR SURGERY)

## 2025-02-26 PROCEDURE — 2709999900 HC NON-CHARGEABLE SUPPLY: Performed by: INTERNAL MEDICINE

## 2025-02-26 DEVICE — SAPIEN 3 ULTRA RESILIA TRANSCATHETER HEART VALVE, 23MM
Type: IMPLANTABLE DEVICE | Site: AORTIC VALVE | Status: FUNCTIONAL
Brand: SAPIEN 3 ULTRA RESILIA

## 2025-02-26 DEVICE — VALVE AORT DIA23 MM TRANSCATHETER ADV CALCIUM BLK TECHNOLOGY: Type: IMPLANTABLE DEVICE | Site: AORTIC VALVE | Status: FUNCTIONAL

## 2025-02-26 RX ORDER — BIOTIN 1 MG
1 TABLET ORAL DAILY
Status: DISCONTINUED | OUTPATIENT
Start: 2025-02-26 | End: 2025-02-26 | Stop reason: RX

## 2025-02-26 RX ORDER — SODIUM CHLORIDE 0.9 % (FLUSH) 0.9 %
5-40 SYRINGE (ML) INJECTION EVERY 12 HOURS SCHEDULED
Status: DISCONTINUED | OUTPATIENT
Start: 2025-02-26 | End: 2025-02-27 | Stop reason: HOSPADM

## 2025-02-26 RX ORDER — MIDODRINE HYDROCHLORIDE 5 MG/1
2.5 TABLET ORAL
Status: DISCONTINUED | OUTPATIENT
Start: 2025-02-26 | End: 2025-02-27 | Stop reason: HOSPADM

## 2025-02-26 RX ORDER — LOSARTAN POTASSIUM 25 MG/1
12.5 TABLET ORAL DAILY
Status: DISCONTINUED | OUTPATIENT
Start: 2025-02-26 | End: 2025-02-27 | Stop reason: HOSPADM

## 2025-02-26 RX ORDER — PHENYLEPHRINE HCL IN 0.9% NACL 1 MG/10 ML
SYRINGE (ML) INTRAVENOUS
Status: DISCONTINUED | OUTPATIENT
Start: 2025-02-26 | End: 2025-02-26 | Stop reason: SDUPTHER

## 2025-02-26 RX ORDER — ONDANSETRON 2 MG/ML
4 INJECTION INTRAMUSCULAR; INTRAVENOUS EVERY 6 HOURS PRN
Status: DISCONTINUED | OUTPATIENT
Start: 2025-02-26 | End: 2025-02-27 | Stop reason: HOSPADM

## 2025-02-26 RX ORDER — ALBUTEROL SULFATE 0.83 MG/ML
2.5 SOLUTION RESPIRATORY (INHALATION) 4 TIMES DAILY PRN
Status: DISCONTINUED | OUTPATIENT
Start: 2025-02-26 | End: 2025-02-27 | Stop reason: HOSPADM

## 2025-02-26 RX ORDER — MULTIVITAMIN WITH IRON
1 TABLET ORAL DAILY
Status: DISCONTINUED | OUTPATIENT
Start: 2025-02-26 | End: 2025-02-27 | Stop reason: HOSPADM

## 2025-02-26 RX ORDER — INSULIN LISPRO 100 [IU]/ML
0-12 INJECTION, SOLUTION INTRAVENOUS; SUBCUTANEOUS
Status: DISCONTINUED | OUTPATIENT
Start: 2025-02-26 | End: 2025-02-26

## 2025-02-26 RX ORDER — PROTAMINE SULFATE 10 MG/ML
INJECTION, SOLUTION INTRAVENOUS
Status: DISCONTINUED | OUTPATIENT
Start: 2025-02-26 | End: 2025-02-26 | Stop reason: SDUPTHER

## 2025-02-26 RX ORDER — HEPARIN SODIUM 1000 [USP'U]/ML
INJECTION, SOLUTION INTRAVENOUS; SUBCUTANEOUS
Status: DISCONTINUED | OUTPATIENT
Start: 2025-02-26 | End: 2025-02-26 | Stop reason: SDUPTHER

## 2025-02-26 RX ORDER — SODIUM CHLORIDE 9 MG/ML
INJECTION, SOLUTION INTRAVENOUS PRN
Status: DISCONTINUED | OUTPATIENT
Start: 2025-02-26 | End: 2025-02-26 | Stop reason: HOSPADM

## 2025-02-26 RX ORDER — ONDANSETRON 4 MG/1
4 TABLET, ORALLY DISINTEGRATING ORAL EVERY 8 HOURS PRN
Status: DISCONTINUED | OUTPATIENT
Start: 2025-02-26 | End: 2025-02-27 | Stop reason: HOSPADM

## 2025-02-26 RX ORDER — LIDOCAINE HYDROCHLORIDE 10 MG/ML
INJECTION, SOLUTION EPIDURAL; INFILTRATION; INTRACAUDAL; PERINEURAL PRN
Status: DISCONTINUED | OUTPATIENT
Start: 2025-02-26 | End: 2025-02-26 | Stop reason: ALTCHOICE

## 2025-02-26 RX ORDER — BUPROPION HYDROCHLORIDE 150 MG/1
150 TABLET ORAL DAILY
Status: DISCONTINUED | OUTPATIENT
Start: 2025-02-27 | End: 2025-02-27 | Stop reason: HOSPADM

## 2025-02-26 RX ORDER — SODIUM CHLORIDE 0.9 % (FLUSH) 0.9 %
5-40 SYRINGE (ML) INJECTION EVERY 12 HOURS SCHEDULED
Status: DISCONTINUED | OUTPATIENT
Start: 2025-02-26 | End: 2025-02-26 | Stop reason: HOSPADM

## 2025-02-26 RX ORDER — SODIUM CHLORIDE 9 MG/ML
INJECTION, SOLUTION INTRAVENOUS PRN
Status: DISCONTINUED | OUTPATIENT
Start: 2025-02-26 | End: 2025-02-27 | Stop reason: HOSPADM

## 2025-02-26 RX ORDER — ACETAMINOPHEN 500 MG
1000 TABLET ORAL NIGHTLY PRN
Status: DISCONTINUED | OUTPATIENT
Start: 2025-02-26 | End: 2025-02-26

## 2025-02-26 RX ORDER — SODIUM CHLORIDE 0.9 % (FLUSH) 0.9 %
5-40 SYRINGE (ML) INJECTION PRN
Status: DISCONTINUED | OUTPATIENT
Start: 2025-02-26 | End: 2025-02-26 | Stop reason: HOSPADM

## 2025-02-26 RX ORDER — SODIUM CHLORIDE 0.9 % (FLUSH) 0.9 %
5-40 SYRINGE (ML) INJECTION PRN
Status: DISCONTINUED | OUTPATIENT
Start: 2025-02-26 | End: 2025-02-27 | Stop reason: HOSPADM

## 2025-02-26 RX ORDER — HEPARIN SODIUM 1000 [USP'U]/ML
INJECTION, SOLUTION INTRAVENOUS; SUBCUTANEOUS PRN
Status: DISCONTINUED | OUTPATIENT
Start: 2025-02-26 | End: 2025-02-26 | Stop reason: HOSPADM

## 2025-02-26 RX ORDER — INSULIN LISPRO 100 [IU]/ML
0-6 INJECTION, SOLUTION INTRAVENOUS; SUBCUTANEOUS NIGHTLY
Status: DISCONTINUED | OUTPATIENT
Start: 2025-02-26 | End: 2025-02-26

## 2025-02-26 RX ORDER — ACETAMINOPHEN 500 MG
1000 TABLET ORAL EVERY 6 HOURS PRN
Status: DISCONTINUED | OUTPATIENT
Start: 2025-02-26 | End: 2025-02-27 | Stop reason: HOSPADM

## 2025-02-26 RX ORDER — FUROSEMIDE 20 MG/1
20 TABLET ORAL DAILY
Status: DISCONTINUED | OUTPATIENT
Start: 2025-02-26 | End: 2025-02-27 | Stop reason: HOSPADM

## 2025-02-26 RX ORDER — POTASSIUM CHLORIDE 1500 MG/1
40 TABLET, EXTENDED RELEASE ORAL PRN
Status: DISCONTINUED | OUTPATIENT
Start: 2025-02-26 | End: 2025-02-27 | Stop reason: HOSPADM

## 2025-02-26 RX ORDER — CLOPIDOGREL BISULFATE 75 MG/1
75 TABLET ORAL DAILY
Status: DISCONTINUED | OUTPATIENT
Start: 2025-02-27 | End: 2025-02-27 | Stop reason: HOSPADM

## 2025-02-26 RX ORDER — SODIUM CHLORIDE 9 MG/ML
INJECTION, SOLUTION INTRAVENOUS CONTINUOUS
Status: DISCONTINUED | OUTPATIENT
Start: 2025-02-26 | End: 2025-02-26 | Stop reason: HOSPADM

## 2025-02-26 RX ORDER — IOPAMIDOL 755 MG/ML
INJECTION, SOLUTION INTRAVASCULAR PRN
Status: DISCONTINUED | OUTPATIENT
Start: 2025-02-26 | End: 2025-02-26 | Stop reason: HOSPADM

## 2025-02-26 RX ORDER — METOPROLOL SUCCINATE 25 MG/1
12.5 TABLET, EXTENDED RELEASE ORAL DAILY
Status: DISCONTINUED | OUTPATIENT
Start: 2025-02-26 | End: 2025-02-27 | Stop reason: HOSPADM

## 2025-02-26 RX ORDER — SODIUM CHLORIDE 9 MG/ML
INJECTION, SOLUTION INTRAVENOUS
Status: DISCONTINUED | OUTPATIENT
Start: 2025-02-26 | End: 2025-02-26 | Stop reason: SDUPTHER

## 2025-02-26 RX ORDER — GLUCAGON 1 MG/ML
1 KIT INJECTION PRN
Status: DISCONTINUED | OUTPATIENT
Start: 2025-02-26 | End: 2025-02-27 | Stop reason: HOSPADM

## 2025-02-26 RX ORDER — CETIRIZINE HYDROCHLORIDE 10 MG/1
10 TABLET ORAL DAILY
Status: DISCONTINUED | OUTPATIENT
Start: 2025-02-26 | End: 2025-02-27 | Stop reason: HOSPADM

## 2025-02-26 RX ORDER — PANTOPRAZOLE SODIUM 40 MG/1
40 TABLET, DELAYED RELEASE ORAL
Status: DISCONTINUED | OUTPATIENT
Start: 2025-02-27 | End: 2025-02-27 | Stop reason: HOSPADM

## 2025-02-26 RX ORDER — DEXTROSE MONOHYDRATE 100 MG/ML
INJECTION, SOLUTION INTRAVENOUS CONTINUOUS PRN
Status: DISCONTINUED | OUTPATIENT
Start: 2025-02-26 | End: 2025-02-27 | Stop reason: HOSPADM

## 2025-02-26 RX ORDER — DOCUSATE SODIUM 100 MG/1
100 CAPSULE, LIQUID FILLED ORAL 2 TIMES DAILY
Status: DISCONTINUED | OUTPATIENT
Start: 2025-02-26 | End: 2025-02-27 | Stop reason: HOSPADM

## 2025-02-26 RX ORDER — MUPIROCIN 20 MG/G
OINTMENT TOPICAL ONCE
Status: COMPLETED | OUTPATIENT
Start: 2025-02-26 | End: 2025-02-26

## 2025-02-26 RX ORDER — POTASSIUM CHLORIDE 7.45 MG/ML
10 INJECTION INTRAVENOUS PRN
Status: DISCONTINUED | OUTPATIENT
Start: 2025-02-26 | End: 2025-02-27 | Stop reason: HOSPADM

## 2025-02-26 RX ORDER — ATORVASTATIN CALCIUM 40 MG/1
40 TABLET, FILM COATED ORAL DAILY
Status: DISCONTINUED | OUTPATIENT
Start: 2025-02-26 | End: 2025-02-27 | Stop reason: HOSPADM

## 2025-02-26 RX ORDER — MAGNESIUM SULFATE IN WATER 40 MG/ML
2000 INJECTION, SOLUTION INTRAVENOUS PRN
Status: DISCONTINUED | OUTPATIENT
Start: 2025-02-26 | End: 2025-02-27 | Stop reason: HOSPADM

## 2025-02-26 RX ORDER — PROPOFOL 10 MG/ML
INJECTION, EMULSION INTRAVENOUS
Status: DISCONTINUED | OUTPATIENT
Start: 2025-02-26 | End: 2025-02-26 | Stop reason: SDUPTHER

## 2025-02-26 RX ORDER — ETOMIDATE 2 MG/ML
INJECTION INTRAVENOUS
Status: DISCONTINUED | OUTPATIENT
Start: 2025-02-26 | End: 2025-02-26 | Stop reason: SDUPTHER

## 2025-02-26 RX ORDER — GABAPENTIN 300 MG/1
300 CAPSULE ORAL 2 TIMES DAILY
Status: DISCONTINUED | OUTPATIENT
Start: 2025-02-26 | End: 2025-02-27 | Stop reason: HOSPADM

## 2025-02-26 RX ORDER — TRAZODONE HYDROCHLORIDE 50 MG/1
25 TABLET ORAL NIGHTLY
Status: DISCONTINUED | OUTPATIENT
Start: 2025-02-26 | End: 2025-02-27 | Stop reason: HOSPADM

## 2025-02-26 RX ORDER — ASPIRIN 81 MG/1
81 TABLET ORAL DAILY
Status: DISCONTINUED | OUTPATIENT
Start: 2025-02-27 | End: 2025-02-27 | Stop reason: HOSPADM

## 2025-02-26 RX ADMIN — PROTAMINE SULFATE 90 MG: 10 INJECTION, SOLUTION INTRAVENOUS at 11:26

## 2025-02-26 RX ADMIN — Medication 100 MCG: at 10:55

## 2025-02-26 RX ADMIN — MUPIROCIN: 20 OINTMENT TOPICAL at 07:34

## 2025-02-26 RX ADMIN — Medication 100 MCG: at 11:10

## 2025-02-26 RX ADMIN — ETOMIDATE 2 MG: 2 INJECTION, SOLUTION INTRAVENOUS at 10:33

## 2025-02-26 RX ADMIN — ACETAMINOPHEN 1000 MG: 500 TABLET ORAL at 20:36

## 2025-02-26 RX ADMIN — GABAPENTIN 300 MG: 300 CAPSULE ORAL at 20:37

## 2025-02-26 RX ADMIN — VANCOMYCIN HYDROCHLORIDE 1000 MG: 1 INJECTION, POWDER, LYOPHILIZED, FOR SOLUTION INTRAVENOUS at 09:05

## 2025-02-26 RX ADMIN — Medication 50 MCG: at 10:34

## 2025-02-26 RX ADMIN — SODIUM CHLORIDE 0.6 MCG/KG/HR: 9 INJECTION, SOLUTION INTRAVENOUS at 10:13

## 2025-02-26 RX ADMIN — PROPOFOL 50 MCG/KG/MIN: 10 INJECTION, EMULSION INTRAVENOUS at 10:13

## 2025-02-26 RX ADMIN — Medication 50 MCG: at 10:42

## 2025-02-26 RX ADMIN — DOCUSATE SODIUM 100 MG: 100 CAPSULE, LIQUID FILLED ORAL at 20:37

## 2025-02-26 RX ADMIN — ACETAMINOPHEN 1000 MG: 500 TABLET ORAL at 14:14

## 2025-02-26 RX ADMIN — Medication 50 MCG: at 10:51

## 2025-02-26 RX ADMIN — SODIUM CHLORIDE: 9 INJECTION, SOLUTION INTRAVENOUS at 09:51

## 2025-02-26 RX ADMIN — TRAZODONE HYDROCHLORIDE 25 MG: 50 TABLET ORAL at 20:37

## 2025-02-26 RX ADMIN — SODIUM CHLORIDE, PRESERVATIVE FREE 10 ML: 5 INJECTION INTRAVENOUS at 20:37

## 2025-02-26 RX ADMIN — HEPARIN SODIUM 2000 UNITS: 1000 INJECTION INTRAVENOUS; SUBCUTANEOUS at 11:02

## 2025-02-26 RX ADMIN — INSULIN LISPRO 1 UNITS: 100 INJECTION, SOLUTION INTRAVENOUS; SUBCUTANEOUS at 20:49

## 2025-02-26 RX ADMIN — ONDANSETRON 4 MG: 2 INJECTION, SOLUTION INTRAMUSCULAR; INTRAVENOUS at 13:58

## 2025-02-26 RX ADMIN — SODIUM CHLORIDE: 9 INJECTION, SOLUTION INTRAVENOUS at 10:17

## 2025-02-26 RX ADMIN — MIDODRINE HYDROCHLORIDE 2.5 MG: 5 TABLET ORAL at 17:18

## 2025-02-26 ASSESSMENT — PAIN SCALES - GENERAL
PAINLEVEL_OUTOF10: 0
PAINLEVEL_OUTOF10: 7
PAINLEVEL_OUTOF10: 0
PAINLEVEL_OUTOF10: 4
PAINLEVEL_OUTOF10: 0
PAINLEVEL_OUTOF10: 0

## 2025-02-26 ASSESSMENT — PAIN - FUNCTIONAL ASSESSMENT
PAIN_FUNCTIONAL_ASSESSMENT: 0-10
PAIN_FUNCTIONAL_ASSESSMENT: ACTIVITIES ARE NOT PREVENTED

## 2025-02-26 ASSESSMENT — LIFESTYLE VARIABLES: SMOKING_STATUS: 0

## 2025-02-26 ASSESSMENT — PAIN DESCRIPTION - LOCATION: LOCATION: GROIN

## 2025-02-26 ASSESSMENT — PAIN DESCRIPTION - ORIENTATION: ORIENTATION: LEFT

## 2025-02-26 ASSESSMENT — PAIN DESCRIPTION - DESCRIPTORS: DESCRIPTORS: ACHING;SORE;DISCOMFORT

## 2025-02-26 ASSESSMENT — ENCOUNTER SYMPTOMS: SHORTNESS OF BREATH: 1

## 2025-02-26 NOTE — PROGRESS NOTES
Patient signed out of anesthesia discharge criteria met. GROVE equally, bilaterally, A&0x3 on 2L via nasal cannula.

## 2025-02-26 NOTE — ANESTHESIA PRE PROCEDURE
Department of Anesthesiology  Preprocedure Note       Name:  Liane Booker   Age:  89 y.o.  :  4/15/1926                                          MRN:  [id number]         Date:  2025      Surgeon: Surgeon(s):  Michael Tiwari MD Jubach, Jeremy, DO Jubach, Jeremy, DO    Procedure: Procedure(s):  TRANSCATHETER AORTIC VALVE REPLACEMENT FEMORAL APPROACH  TRANSCATHETER AORTIC VALVE REPLACEMENT FEMORAL APPROACH    Medications prior to admission:   Prior to Admission medications    Medication Sig Start Date End Date Taking? Authorizing Provider   midodrine (PROAMATINE) 2.5 MG tablet Take 1 tablet by mouth 3 times daily (with meals) 25  Yes Asad Harris MD   buPROPion (WELLBUTRIN XL) 150 MG extended release tablet Take 1 tablet by mouth once daily 25  Yes Linda Santiago MD   levothyroxine (SYNTHROID) 125 MCG tablet Take 1 tablet by mouth once daily 25  Yes Linda Santiago MD   omeprazole (PRILOSEC) 40 MG delayed release capsule Take 1 capsule by mouth once daily 25  Yes Linda Santiago MD   metoprolol succinate (TOPROL XL) 25 MG extended release tablet Take 0.5 tablets by mouth daily 24  Yes Linda Santiago MD   atorvastatin (LIPITOR) 40 MG tablet Take 1 tablet by mouth daily 24  Yes Linda Santiago MD   gabapentin (NEURONTIN) 300 MG capsule Take 1 capsule by mouth in the morning and at bedtime for 90 days. 12/4/24 3/4/25 Yes Linda Santiago MD   traZODone (DESYREL) 50 MG tablet Take 0.5 tablets by mouth daily  Patient taking differently: Take 0.5 tablets by mouth nightly 24  Yes Linda Santiago MD   clopidogrel (PLAVIX) 75 MG tablet Take 1 tablet by mouth daily 10/16/24  Yes Linda Santiago MD   furosemide (LASIX) 20 MG tablet Take 1 tablet by mouth daily 24  Yes Shaylee Salgado MD   EQ ASPIRIN ADULT LOW DOSE 81 MG EC tablet Take 1 tablet by mouth daily 9/1/23  Yes Deborah Whiting MD   acetaminophen (TYLENOL) 500 MG tablet

## 2025-02-26 NOTE — OP NOTE
TRANSCATHETER AORTIC VALVE REPLACEMENT  Operative Note      Date of procedure:  2/26/2025    Interventional Cardiologist: Michael Tiwari MD  Cardiothoracic Surgeon: Jim Dennison DO    Pre-operative diagnosis: Severe symptomatic aortic stenosis.   Post-operative diagnosis: Successful transcatheter aortic valve replacement (TAVR) using a 26-mm JENNIFER 3 Ultra valve.       TAVR access: right common femoral artery percutaneous approach  Pigtail access: left common femoral artery  Appropriate arterial and venous lines were additionally placed by anesthesia as needed.    TAVR access closure: Prostyle Perclose   Pigtail access closure: manual pressure    Procedure:   Arterial access in the TAVR and Pigtail access sites as above  Limited peripheral angiography   Temporary transvenous pacer insertion via right femoral vein   Aortic root angiography  Transfemoral TAVR using a 26-mm JENNIFER 3 Ultra valve      Anesthesia: deep sedation/MAC    Indication for procedure:   Severe symptomatic aortic stenosis    “Severe aortic stenosis Shared Decision Making discussion took place using the CardioSmart/[employer] AS: TAVR/SAVR tool. The treatment plan formulated by the Heart Team and the patient & the patient’s preference for AVR is TAVR”    Description of the Procedure:   Following informed consent, the patient was bought to the hybrid OR and placed under anesthesia as above. Transthoracic echo was used to aid in guidance of the procedure.     Using ultrasound guidance and angiographic confirmation, a a micropuncture needle  was used to access the  and a placeholder sheath was placed; ; this access will be used to deliver the trascatheter heart valve eventually. Using ultrasound guidance, a micropuncture needle was used to access the artery and appropriate site was confirmed using angiography and a 5F sheath was placed; this access will be used for the Pigtail catheter subsequently. The  femoral vein was accessed

## 2025-02-26 NOTE — ANESTHESIA POSTPROCEDURE EVALUATION
Department of Anesthesiology  Postprocedure Note    Patient: Liane Booker  MRN: [id number]  YOB: 1926  Date of evaluation: 2/26/2025    Procedure Summary       Date: 02/26/25 Room / Location: [address]    Anesthesia Start: 0958 Anesthesia Stop: 1158    Procedures:       TRANSCATHETER AORTIC VALVE REPLACEMENT FEMORAL APPROACH      TRANSCATHETER AORTIC VALVE REPLACEMENT FEMORAL APPROACH Diagnosis:       Nodular calcific aortic valve stenosis      (Nodular calcific aortic valve stenosis [I35.0])    Surgeons: Michael Tiwari MD; Jim Dennison DO Responsible Provider: Yfn Hernandez DO    Anesthesia Type: MAC ASA Status: 4            Anesthesia Type: No value filed.    Tl Phase I: Tl Score: 9    Tl Phase II:      Anesthesia Post Evaluation    Patient location during evaluation: bedside  Patient participation: complete - patient cannot participate  Level of consciousness: awake and alert  Airway patency: patent  Nausea & Vomiting: no nausea and no vomiting  Cardiovascular status: blood pressure returned to baseline  Respiratory status: acceptable  Hydration status: euvolemic  Multimodal analgesia pain management approach    No notable events documented.

## 2025-02-26 NOTE — PROGRESS NOTES
Pharmacy Note    Liane Booker was ordered Biotin.  As per the [facility] Formulary Committee Policy, herbals and certain dietary supplements will be discontinued.  The herbal or dietary supplement may be continued after discharge from the hospital.    Delmer Zamudio RPH, 2/26/2025 12:21 PM

## 2025-02-26 NOTE — PROGRESS NOTES
4 Eyes Skin Assessment     NAME:  Liane Booker  YOB: 1926  MEDICAL RECORD NUMBER:  [id number]    The patient is being assessed for  Post-Op Surgical    I agree that at least one RN has performed a thorough Head to Toe Skin Assessment on the patient. ALL assessment sites listed below have been assessed.      Areas assessed by both nurses:    Head, Face, Ears, Shoulders, Back, Chest, Arms, Elbows, Hands, Sacrum. Buttock, Coccyx, Ischium, Legs. Feet and Heels, and Under Medical Devices   L buttocks Skin tear  L breast skin tear  Bilat Buttocks/Sacrum redness/blanchabe  Does the Patient have a Wound? No noted wound(s)       Nav Prevention initiated by RN: Yes  Wound Care Orders initiated by RN: No    Pressure Injury (Stage 3,4, Unstageable, DTI, NWPT, and Complex wounds) if present, place Wound referral order by RN under : No    New Ostomies, if present place, Ostomy referral order under : No     Nurse 1 eSignature: Electronically signed by JOSÉ LUIS CARTER RN on 2/26/25 at 2:52 PM EST    **SHARE this note so that the co-signing nurse can place an eSignature**    Nurse 2 eSignature: Electronically signed by Milagros Guzman RN on 2/26/25 at 3:01 PM EST

## 2025-02-26 NOTE — ANESTHESIA PROCEDURE NOTES
Arterial Line:    An arterial line was placed using surface landmarks, in the holding area for the following indication(s): continuous blood pressure monitoring and blood sampling needed.    A 20 gauge (size), 1 and 3/8 inch (length), Arrow (type) catheter was placed, Seldinger technique not used, into the left radial artery, secured by tape and Tegaderm.  Anesthesia type: Local  Local infiltration: Injection    Events:  patient tolerated procedure well with no complications.2/26/2025 9:03 AM  Anesthesiologist: Yfn Hernandez DO  Other anesthesia staff: Vikash Larson RN  Performed: Anesthesiologist   Preanesthetic Checklist  Completed: patient identified, IV checked, site marked, risks and benefits discussed, surgical/procedural consents, equipment checked, pre-op evaluation, timeout performed, anesthesia consent given, oxygen available and monitors applied/VS acknowledged

## 2025-02-26 NOTE — PROGRESS NOTES
Patient admitted to [facility] with the following belongings:  Dentures (Upper), Dentures (Lower), Socks, Shoes, Jacket, and Other pajamas top and bottom  and cellphone. The following belongings admitted with the patient, None, were sent home with the patient's family.

## 2025-02-26 NOTE — PROGRESS NOTES
Patient admitted to CVICU s/p TAVR. Connected to monitor, on 2L via nasal cannula. Patient alert, vital signs stable.

## 2025-02-26 NOTE — PLAN OF CARE
Problem: Pain  Goal: Verbalizes/displays adequate comfort level or baseline comfort level  Outcome: Progressing  Flowsheets (Taken 2/26/2025 1228)  Verbalizes/displays adequate comfort level or baseline comfort level:   Administer analgesics based on type and severity of pain and evaluate response   Encourage patient to monitor pain and request assistance     Problem: Safety - Adult  Goal: Free from fall injury  Outcome: Progressing  Flowsheets (Taken 2/26/2025 1228)  Free From Fall Injury: Instruct family/caregiver on patient safety     Problem: Neurosensory - Adult  Goal: Achieves stable or improved neurological status  Outcome: Progressing  Flowsheets (Taken 2/26/2025 1228)  Achieves stable or improved neurological status:   Initiate measures to prevent increased intracranial pressure   Maintain blood pressure and fluid volume within ordered parameters to optimize cerebral perfusion and minimize risk of hemorrhage   Assess for and report changes in neurological status     Problem: Respiratory - Adult  Goal: Achieves optimal ventilation and oxygenation  Outcome: Progressing  Flowsheets (Taken 2/26/2025 1228)  Achieves optimal ventilation and oxygenation:   Assess for changes in respiratory status   Assess the need for suctioning and aspirate as needed   Position to facilitate oxygenation and minimize respiratory effort     Problem: Cardiovascular - Adult  Goal: Maintains optimal cardiac output and hemodynamic stability  Outcome: Progressing  Flowsheets (Taken 2/26/2025 1228)  Maintains optimal cardiac output and hemodynamic stability:   Monitor blood pressure and heart rate   Monitor urine output and notify Licensed Independent Practitioner for values outside of normal range   Assess for signs of decreased cardiac output   Administer fluid and/or volume expanders as ordered     Problem: Skin/Tissue Integrity - Adult  Goal: Skin integrity remains intact  Outcome: Progressing  Flowsheets (Taken 2/26/2025

## 2025-02-27 ENCOUNTER — TELEPHONE (OUTPATIENT)
Dept: CARDIOLOGY CLINIC | Age: 89
End: 2025-02-27

## 2025-02-27 ENCOUNTER — APPOINTMENT (OUTPATIENT)
Age: 89
DRG: 267 | End: 2025-02-27
Attending: INTERNAL MEDICINE
Payer: MEDICARE

## 2025-02-27 VITALS
WEIGHT: 144.38 LBS | DIASTOLIC BLOOD PRESSURE: 54 MMHG | OXYGEN SATURATION: 92 % | BODY MASS INDEX: 25.58 KG/M2 | SYSTOLIC BLOOD PRESSURE: 122 MMHG | TEMPERATURE: 97.2 F | RESPIRATION RATE: 18 BRPM | HEIGHT: 63 IN | HEART RATE: 77 BPM

## 2025-02-27 LAB
ACTIVATED CLOTTING TIME, LOW RANGE: 150 SEC
ACTIVATED CLOTTING TIME, LOW RANGE: 159 SEC
ACTIVATED CLOTTING TIME, LOW RANGE: 289 SEC
ACTIVATED CLOTTING TIME, LOW RANGE: 350 SEC
ACTIVATED CLOTTING TIME, LOW RANGE: >400 SEC
ECHO AR MAX VEL PISA: 1.6 M/S
ECHO AV AREA PEAK VELOCITY: 1.9 CM2
ECHO AV AREA VTI: 2 CM2
ECHO AV AREA/BSA PEAK VELOCITY: 1.1 CM2/M2
ECHO AV AREA/BSA VTI: 1.2 CM2/M2
ECHO AV CUSP MM: 0.8 CM
ECHO AV MEAN GRADIENT: 8 MMHG
ECHO AV MEAN VELOCITY: 1.4 M/S
ECHO AV PEAK GRADIENT: 14 MMHG
ECHO AV PEAK VELOCITY: 1.9 M/S
ECHO AV REGURGITANT PHT: 243.4 MS
ECHO AV VELOCITY RATIO: 0.63
ECHO AV VTI: 38.8 CM
ECHO BSA: 1.67 M2
ECHO BSA: 1.71 M2
ECHO EST RA PRESSURE: 3 MMHG
ECHO LA DIAMETER INDEX: 2.44 CM/M2
ECHO LA DIAMETER: 4.1 CM
ECHO LV EF PHYSICIAN: 38 %
ECHO LV FRACTIONAL SHORTENING: 25 % (ref 28–44)
ECHO LV INTERNAL DIMENSION DIASTOLE INDEX: 3.27 CM/M2
ECHO LV INTERNAL DIMENSION DIASTOLIC: 5.5 CM (ref 3.9–5.3)
ECHO LV INTERNAL DIMENSION SYSTOLIC INDEX: 2.44 CM/M2
ECHO LV INTERNAL DIMENSION SYSTOLIC: 4.1 CM
ECHO LV IVSD: 0.8 CM (ref 0.6–0.9)
ECHO LV IVSS: 1.2 CM
ECHO LV MASS 2D: 172.7 G (ref 67–162)
ECHO LV MASS INDEX 2D: 102.8 G/M2 (ref 43–95)
ECHO LV POSTERIOR WALL DIASTOLIC: 0.9 CM (ref 0.6–0.9)
ECHO LV POSTERIOR WALL SYSTOLIC: 1.1 CM
ECHO LV RELATIVE WALL THICKNESS RATIO: 0.33
ECHO LVOT AREA: 3.1 CM2
ECHO LVOT AV VTI INDEX: 0.64
ECHO LVOT DIAM: 2 CM
ECHO LVOT MEAN GRADIENT: 3 MMHG
ECHO LVOT PEAK GRADIENT: 5 MMHG
ECHO LVOT PEAK VELOCITY: 1.2 M/S
ECHO LVOT STROKE VOLUME INDEX: 46.4 ML/M2
ECHO LVOT SV: 77.9 ML
ECHO LVOT VTI: 24.8 CM
ECHO MV AREA PHT: 4.2 CM2
ECHO MV AREA VTI: 1.8 CM2
ECHO MV EROA PISA: 0.6 CM2
ECHO MV LVOT VTI INDEX: 1.75
ECHO MV MAX VELOCITY: 1.7 M/S
ECHO MV MEAN GRADIENT: 6 MMHG
ECHO MV MEAN VELOCITY: 1.1 M/S
ECHO MV PEAK GRADIENT: 11 MMHG
ECHO MV PRESSURE HALF TIME (PHT): 52.6 MS
ECHO MV REGURGITANT ALIASING (NYQUIST) VELOCITY: 36 CM/S
ECHO MV REGURGITANT RADIUS PISA: 1.16 CM
ECHO MV REGURGITANT VELOCITY PISA: 5 M/S
ECHO MV REGURGITANT VOLUME PISA: 93.27 ML
ECHO MV REGURGITANT VTIA: 153.3 CM
ECHO MV VTI: 43.5 CM
ECHO RIGHT VENTRICULAR SYSTOLIC PRESSURE (RVSP): 56 MMHG
ECHO RV INTERNAL DIMENSION: 2.4 CM
ECHO TV REGURGITANT MAX VELOCITY: 3.65 M/S
ECHO TV REGURGITANT PEAK GRADIENT: 53 MMHG
EKG ATRIAL RATE: 69 BPM
EKG P AXIS: 55 DEGREES
EKG P-R INTERVAL: 148 MS
EKG Q-T INTERVAL: 466 MS
EKG QRS DURATION: 132 MS
EKG QTC CALCULATION (BAZETT): 499 MS
EKG R AXIS: 24 DEGREES
EKG T AXIS: 144 DEGREES
EKG VENTRICULAR RATE: 69 BPM

## 2025-02-27 PROCEDURE — 2500000003 HC RX 250 WO HCPCS: Performed by: PHYSICIAN ASSISTANT

## 2025-02-27 PROCEDURE — 93005 ELECTROCARDIOGRAM TRACING: CPT | Performed by: PHYSICIAN ASSISTANT

## 2025-02-27 PROCEDURE — 6370000000 HC RX 637 (ALT 250 FOR IP): Performed by: PHYSICIAN ASSISTANT

## 2025-02-27 PROCEDURE — 2580000003 HC RX 258: Performed by: PHYSICIAN ASSISTANT

## 2025-02-27 PROCEDURE — 2700000000 HC OXYGEN THERAPY PER DAY

## 2025-02-27 PROCEDURE — 93321 DOPPLER ECHO F-UP/LMTD STD: CPT

## 2025-02-27 PROCEDURE — 6360000002 HC RX W HCPCS: Performed by: PHYSICIAN ASSISTANT

## 2025-02-27 RX ADMIN — ASPIRIN 81 MG: 81 TABLET, COATED ORAL at 09:53

## 2025-02-27 RX ADMIN — GABAPENTIN 300 MG: 300 CAPSULE ORAL at 09:51

## 2025-02-27 RX ADMIN — SODIUM CHLORIDE, PRESERVATIVE FREE 10 ML: 5 INJECTION INTRAVENOUS at 10:05

## 2025-02-27 RX ADMIN — BUPROPION HYDROCHLORIDE 150 MG: 150 TABLET, EXTENDED RELEASE ORAL at 09:53

## 2025-02-27 RX ADMIN — ATORVASTATIN CALCIUM 40 MG: 40 TABLET, FILM COATED ORAL at 09:53

## 2025-02-27 RX ADMIN — VANCOMYCIN HYDROCHLORIDE 1000 MG: 1 INJECTION, POWDER, LYOPHILIZED, FOR SOLUTION INTRAVENOUS at 09:59

## 2025-02-27 RX ADMIN — DOCUSATE SODIUM 100 MG: 100 CAPSULE, LIQUID FILLED ORAL at 09:51

## 2025-02-27 RX ADMIN — LOSARTAN POTASSIUM 12.5 MG: 25 TABLET, FILM COATED ORAL at 09:53

## 2025-02-27 RX ADMIN — LEVOTHYROXINE SODIUM 125 MCG: 0.1 TABLET ORAL at 09:53

## 2025-02-27 RX ADMIN — CETIRIZINE HYDROCHLORIDE 10 MG: 10 TABLET, FILM COATED ORAL at 09:53

## 2025-02-27 RX ADMIN — CALCIUM CARBONATE-VITAMIN D TAB 500 MG-200 UNIT 1 TABLET: 500-200 TAB at 09:51

## 2025-02-27 RX ADMIN — DICLOFENAC SODIUM 2 G: 10 GEL TOPICAL at 12:30

## 2025-02-27 RX ADMIN — ACETAMINOPHEN 1000 MG: 500 TABLET ORAL at 03:24

## 2025-02-27 RX ADMIN — CLOPIDOGREL BISULFATE 75 MG: 75 TABLET ORAL at 09:51

## 2025-02-27 RX ADMIN — MULTIVITAMIN TABLET 1 TABLET: TABLET at 09:53

## 2025-02-27 RX ADMIN — MIDODRINE HYDROCHLORIDE 2.5 MG: 5 TABLET ORAL at 12:22

## 2025-02-27 RX ADMIN — METOPROLOL SUCCINATE 12.5 MG: 25 TABLET, EXTENDED RELEASE ORAL at 12:22

## 2025-02-27 RX ADMIN — ACETAMINOPHEN 1000 MG: 500 TABLET ORAL at 12:28

## 2025-02-27 RX ADMIN — PANTOPRAZOLE SODIUM 40 MG: 40 TABLET, DELAYED RELEASE ORAL at 06:38

## 2025-02-27 RX ADMIN — FUROSEMIDE 20 MG: 20 TABLET ORAL at 09:53

## 2025-02-27 RX ADMIN — MIDODRINE HYDROCHLORIDE 2.5 MG: 5 TABLET ORAL at 09:52

## 2025-02-27 ASSESSMENT — PAIN DESCRIPTION - LOCATION
LOCATION: SHOULDER
LOCATION: GENERALIZED

## 2025-02-27 ASSESSMENT — PAIN SCALES - GENERAL
PAINLEVEL_OUTOF10: 9
PAINLEVEL_OUTOF10: 2
PAINLEVEL_OUTOF10: 0
PAINLEVEL_OUTOF10: 7

## 2025-02-27 ASSESSMENT — PAIN DESCRIPTION - ORIENTATION
ORIENTATION: RIGHT;LEFT
ORIENTATION: OTHER (COMMENT)

## 2025-02-27 ASSESSMENT — PAIN DESCRIPTION - DESCRIPTORS
DESCRIPTORS: ACHING;DISCOMFORT
DESCRIPTORS: ACHING;DULL;DISCOMFORT

## 2025-02-27 ASSESSMENT — PAIN DESCRIPTION - PAIN TYPE: TYPE: CHRONIC PAIN

## 2025-02-27 ASSESSMENT — PAIN DESCRIPTION - ONSET: ONSET: GRADUAL

## 2025-02-27 ASSESSMENT — PAIN DESCRIPTION - FREQUENCY: FREQUENCY: CONTINUOUS

## 2025-02-27 NOTE — PROGRESS NOTES
Report called to Edna on CSU. Pt belongings gathered, transport called. Pt to transfer to room [facility].

## 2025-02-27 NOTE — PROGRESS NOTES
Left radial arterial line removed without difficulty. Pressure held for 5 minutes, pressure dressing applied. Catheter intact. No bleeding, swelling or hematoma noted.

## 2025-02-27 NOTE — PLAN OF CARE
Problem: Pain  Goal: Verbalizes/displays adequate comfort level or baseline comfort level  2/26/2025 2154 by Naveed Rivera RN  Outcome: Progressing  Flowsheets (Taken 2/26/2025 2000)  Verbalizes/displays adequate comfort level or baseline comfort level:   Encourage patient to monitor pain and request assistance   Assess pain using appropriate pain scale   Administer analgesics based on type and severity of pain and evaluate response   Implement non-pharmacological measures as appropriate and evaluate response  2/26/2025 1228 by Markie Santana RN  Outcome: Progressing  Flowsheets (Taken 2/26/2025 1228)  Verbalizes/displays adequate comfort level or baseline comfort level:   Administer analgesics based on type and severity of pain and evaluate response   Encourage patient to monitor pain and request assistance     Problem: Safety - Adult  Goal: Free from fall injury  2/26/2025 2154 by Naveed Rivera RN  Outcome: Progressing  Flowsheets (Taken 2/26/2025 2000)  Free From Fall Injury: Instruct family/caregiver on patient safety  2/26/2025 1228 by Markie Santana RN  Outcome: Progressing  Flowsheets (Taken 2/26/2025 1228)  Free From Fall Injury: Instruct family/caregiver on patient safety     Problem: Neurosensory - Adult  Goal: Achieves stable or improved neurological status  2/26/2025 2154 by Naveed Rivera RN  Outcome: Progressing  Flowsheets (Taken 2/26/2025 2000)  Achieves stable or improved neurological status: Assess for and report changes in neurological status  2/26/2025 1228 by Markie Santana RN  Outcome: Progressing  Flowsheets (Taken 2/26/2025 1228)  Achieves stable or improved neurological status:   Initiate measures to prevent increased intracranial pressure   Maintain blood pressure and fluid volume within ordered parameters to optimize cerebral perfusion and minimize risk of hemorrhage   Assess for and report changes in neurological status     Problem: Respiratory - Adult  Goal:

## 2025-02-27 NOTE — PLAN OF CARE
Problem: Discharge Planning  Goal: Discharge to home or other facility with appropriate resources  Recent Flowsheet Documentation  Taken 2/26/2025 2000 by Naveed Rivera RN  Discharge to home or other facility with appropriate resources: Identify barriers to discharge with patient and caregiver     Problem: Pain  Goal: Verbalizes/displays adequate comfort level or baseline comfort level  2/26/2025 2301 by Edna Huizar RN  Outcome: Progressing     Problem: Safety - Adult  Goal: Free from fall injury  2/26/2025 2154 by Naveed Rivera RN  Outcome: Progressing  Flowsheets (Taken 2/26/2025 2000)  Free From Fall Injury: Instruct family/caregiver on patient safety     Problem: Cardiovascular - Adult  Goal: Maintains optimal cardiac output and hemodynamic stability  2/26/2025 2301 by Edna Huizar RN  Outcome: Progressing  2/26/2025 2154 by Naveed Rivera RN  Outcome: Progressing  Flowsheets (Taken 2/26/2025 2000)  Maintains optimal cardiac output and hemodynamic stability:   Monitor blood pressure and heart rate   Monitor urine output and notify Licensed Independent Practitioner for values outside of normal range   Assess for signs of decreased cardiac output   Administer fluid and/or volume expanders as ordered   Administer vasoactive medications as ordered  2/26/2025 1228 by Markie Santana RN  Outcome: Progressing  Flowsheets (Taken 2/26/2025 1228)  Maintains optimal cardiac output and hemodynamic stability:   Monitor blood pressure and heart rate   Monitor urine output and notify Licensed Independent Practitioner for values outside of normal range   Assess for signs of decreased cardiac output   Administer fluid and/or volume expanders as ordered

## 2025-02-27 NOTE — NURSE NAVIGATOR
Patient is eligible for [employer] Heart Failure Management System (HFMS) and is agreeable to the Heart Failure Management System.     Discussed with patient and daughter Yanira and patient was given patient HFMS pamphlet and QR code for additional resource.  Demo HFMS device shown and demonstrated to them both.  Patient agreeable and order placed in [employer] [facility].      HFMS will be mailed out to patient in 24-48 hours and instructed to call [employer] if assistance is needed for application.

## 2025-02-27 NOTE — CARE COORDINATION
2/27/25, Patient admitted for Nodular calcific aortic valve stenosis.  SW met with patient in room to discuss transition of care/SW role.  Patient lives with daughter in a home.  There are 3 steps to enter the home and patient resides on the 1st floor.  DME [facility] in past for 2L.  [facility] is active with patient ETHAN has been placed on chart.  Olivia from [facility] informed on patient discharging.  PCP is Dr. Santiago and Pharmacy is [facility] in [place].  No MARY GRACE hx.  Discharge plan will be home with no other needs identified.  SW to follow.      Case Management Assessment  Initial Evaluation    Date/Time of Evaluation: 2/27/2025 5:03 PM  Assessment Completed by: ELVIN Damon    If patient is discharged prior to next notation, then this note serves as note for discharge by case management.    Patient Name: Liane Booker                   YOB: 1926  Diagnosis: Nodular calcific aortic valve stenosis [I35.0]                   Date / Time: 2/26/2025  6:56 AM    Patient Admission Status: Inpatient   Readmission Risk (Low < 19, Mod (19-27), High > 27): Readmission Risk Score: 18.4    Current PCP: Linda Santiago MD  PCP verified by CM? Yes    Chart Reviewed: Yes      History Provided by: Patient  Patient Orientation: Alert and Oriented    Patient Cognition: Alert    Hospitalization in the last 30 days (Readmission):  Yes    If yes, Readmission Assessment in CM Navigator will be completed.    Advance Directives:      Code Status: Prior   Patient's Primary Decision Maker is:      Primary Decision Maker: Yoselin Ledesma - Child - [phone]    Primary Decision Maker: Ben Sierra - Child - [phone]    Discharge Planning:    Patient lives with: Family Members Type of Home: House  Primary Care Giver: Self  Patient Support Systems include: Children   Current Financial resources:    Current community resources:    Current services prior to admission: Home Care            Current DME:

## 2025-02-27 NOTE — DISCHARGE INSTRUCTIONS
Cardiac Rehabilitation: Discharge instructions        Cardiac rehabilitation is a program for people who have a heart problem, such as a heart attack, coronary stent placed, heart failure, or a heart valve disease. The program includes exercise, lifestyle changes, education, and emotional support. Cardiac rehab can help you improve the quality of your life through better overall health. It can help you lose weight and feel better about yourself.    On your cardiac rehab team, you may have your doctor, a nurse specialist, an exercise physiologist, and a dietitian. They will design your cardiac rehab program specifically for you. You will learn how to reduce your risk for heart problems, how to manage stress, and how to eat a heart-healthy diet. By the end of the program, you will be ready to maintain a healthier lifestyle on your own.    Follow-up care is a key part of your treatment and safety. Be sure to make and go to all appointments, and call your doctor if you are having problems. It's also a good idea to know your test results and keep a list of the medicines you take.    Please call to schedule your first appointment once you have been cleared by your cardiologist to attend Phase II Outpatient Cardiac Rehabilitation.       Cardiac Rehabilitation Options:  [facility]iac [facility]             [facility]  [address]                                                                                          Cardiology Services  [place], [place] [address]                                                                              [address]  P- [phone]                                                                                         [place] [address]  Hours: M/W/F 7am-7pm & T/Th 7am-12pm                                                  [phone]

## 2025-02-27 NOTE — CONSULTS
Consult Note    Dental Resident    Patient: Liane Booker  MRN: [id number]  Date of Service: 2/19/2025    Reason for Consult/CHIEF COMPLAINT:  pt has no CC and is unsure why she is in the dental clinic. Consult is clearance for valve surgery,      History Obtained From:  patient  PCP: Linda Santiago MD    Requesting Physician: Cornelia Carbajal PA    HISTORY OF PRESENT ILLNESS:   The patient is a 89 y.o. female who presents with           Diagnosis Date    Anxiety     Depression     DJD (degenerative joint disease)     Dysphagia 02/20/2014    GERD (gastroesophageal reflux disease) 02/20/2014    Hyperlipidemia     Hypertension     Hypothyroidism     Irritable bowel syndrome with constipation     Low vitamin D level     Osteoarthritis     Valvular heart disease            Procedure Laterality Date    APPENDECTOMY      CARPAL TUNNEL RELEASE Right 10/05/2016    RIGHT INDEX FINGER TRIGGER RELEASE RIGHT THUMB CARPAL METACARPAL ARTHROPLASTY WITH LIGAMENT RECONSTRUCTION    CATARACT EXTRACTION, BILATERAL      CHOLECYSTECTOMY, LAPAROSCOPIC      ENDOSCOPY, COLON, DIAGNOSTIC  03/03/2014    biopsy    HYSTERECTOMY (CERVIX STATUS UNKNOWN)      45 yrs ago     JOINT REPLACEMENT      bilateral knees    THORACENTESIS Left 02/17/2025    450ml clear yellow    THORACENTESIS Right 02/18/2025    300cc clear yellow    UPPER GASTROINTESTINAL ENDOSCOPY  10/05/2018    Dr. Cooper    UPPER GASTROINTESTINAL ENDOSCOPY N/A 04/17/2023    EGD ESOPHAGOGASTRODUODENOSCOPY DILATATION performed by Artur Trevizo MD at [facility] ENDOSCOPY       Home Medications:  Prior to Admission medications    Medication Sig Start Date End Date Taking? Authorizing Provider   buPROPion (WELLBUTRIN XL) 150 MG extended release tablet Take 1 tablet by mouth once daily 1/22/25   Linda Santiago MD   levothyroxine (SYNTHROID) 125 MCG tablet Take 1 tablet by mouth once daily 1/22/25   Linda Santiago MD   omeprazole (PRILOSEC) 40 MG delayed release capsule Take 1 
Met with patient and discussed that their physician has ordered a referral to our outpatient Phase II Cardiac Rehabilitation program. Reviewed the benefits of cardiac rehabilitation based on their diagnosis and personal risk factors. Patient demonstrates mild interest in Cardiac Rehabilitation at this time.  Cardiac Rehabilitation brochure provided to patient/family. The Cardiac Rehabilitation Program has been provided the patient's referral information and pertinent patient details and history. The patient may call [facility] Cardiac Rehabilitation at [phone] for additional information or questions. Contact information for [facility] Cardiac Rehabilitation and other choices close to the patient's residence have been provided in the discharge instructions so that the patient may call and schedule an appointment when cleared by their physician. Thank you for the referral.  
notified of inaccurate mcqueen weights or I/O        Discharge Plan:  Above identified barriers reviewed and needs addressed    Patient/family educated on daily monitoring tools for CHF, made aware of signs and symptoms of worsening HF and to notify provider immediately of change in symptoms.     Heart Failure Home Instructions placed in patient's discharge instructions    Per AHA guidelines patient to be closely monitored following discharge with 7 day follow up appointment    Scheduling with the CHF clinic New consult to CHF clinic, appointment scheduled    Future Appointments   Date Time Provider Department Center   3/13/2025 12:45 PM [facility] ROOM 4 [facility]   4/9/2025 11:30 AM [facility]   6/27/2025  1:00 PM [facility]           Patient Education:  Self Monitoring/management:  Reviewed the introduction to Heart Failure, the HF zones, signs and symptoms to report on day 1 of onset, medications, medication compliance, the importance of obtaining daily weights, following a low sodium diet, reading food labels for the sodium content, keeping physician appointments, and smoking cessation.  Discussed writing / tracking daily weights on a calendar / log because a 5 pound gain in 1 week can sneak up if you are not tracking it.   Advised patient they can reduce the risk for Heart Failure exacerbations by modifying / controlling the risk factors.  Discussed self-managed care which includes the following: take medications as prescribed, report any intolerable side effects of medications to the medical provider (PCP or cardiologist), do not just stop taking the medication; follow a cardiac heart healthy / low sodium diet; weigh yourself daily, exercise regularly- per doctor recommendation and not to smoke or use an excess amount of alcohol.  Discussed calling the cardiologist / doctor with a weight gain of 3 pounds in one day or a total of 5 pounds

## 2025-02-27 NOTE — PROGRESS NOTES
Monitor dcd cleaned and placed in slot in nurses station. Discharge instructions and meds reviewed with patient. Care of groins reviewed with patient. Left in wheelchair with all belongings that were documented that she came in with.

## 2025-02-27 NOTE — PROGRESS NOTES
4 Eyes Skin Assessment     NAME:  Liane Booker  YOB: 1926  MEDICAL RECORD NUMBER:  [id number]    The patient is being assessed for  Admission    I agree that at least one RN has performed a thorough Head to Toe Skin Assessment on the patient. ALL assessment sites listed below have been assessed.      Areas assessed by both nurses:    Head, Face, Ears, Shoulders, Back, Chest, Arms, Elbows, Hands, Sacrum. Buttock, Coccyx, Ischium, and Legs. Feet and Heels        Does the Patient have a Wound? No noted wound(s)       Nav Prevention initiated by RN: Yes  Wound Care Orders initiated by RN: Yes    Pressure Injury (Stage 3,4, Unstageable, DTI, NWPT, and Complex wounds) if present, place Wound referral order by RN under : No    New Ostomies, if present place, Ostomy referral order under : No     Nurse 1 eSignature: Electronically signed by Edna Huizar RN on 2/27/25 at 12:54 AM EST    **SHARE this note so that the co-signing nurse can place an eSignature**    Nurse 2 eSignature: Electronically signed by Margaret Woods RN on 2/27/25 at 3:02 AM EST

## 2025-02-28 ENCOUNTER — CARE COORDINATION (OUTPATIENT)
Dept: CARE COORDINATION | Age: 89
End: 2025-02-28

## 2025-02-28 DIAGNOSIS — I35.0 NODULAR CALCIFIC AORTIC VALVE STENOSIS: Primary | ICD-10-CM

## 2025-02-28 LAB
ECHO AV AREA PEAK VELOCITY: 1.8 CM2
ECHO AV AREA VTI: 1.7 CM2
ECHO AV AREA/BSA PEAK VELOCITY: 1.1 CM2/M2
ECHO AV AREA/BSA VTI: 1 CM2/M2
ECHO AV MEAN GRADIENT: 3 MMHG
ECHO AV MEAN VELOCITY: 0.8 M/S
ECHO AV PEAK GRADIENT: 6 MMHG
ECHO AV PEAK VELOCITY: 1.2 M/S
ECHO AV VELOCITY RATIO: 0.58
ECHO AV VTI: 28.1 CM
ECHO BSA: 1.67 M2
ECHO LV EF PHYSICIAN: 30 %
ECHO LV FRACTIONAL SHORTENING: 23 % (ref 28–44)
ECHO LV INTERNAL DIMENSION DIASTOLE INDEX: 3.17 CM/M2
ECHO LV INTERNAL DIMENSION DIASTOLIC: 5.3 CM (ref 3.9–5.3)
ECHO LV INTERNAL DIMENSION SYSTOLIC INDEX: 2.46 CM/M2
ECHO LV INTERNAL DIMENSION SYSTOLIC: 4.1 CM
ECHO LVOT AREA: 3.1 CM2
ECHO LVOT AV VTI INDEX: 0.56
ECHO LVOT DIAM: 2 CM
ECHO LVOT MEAN GRADIENT: 1 MMHG
ECHO LVOT PEAK GRADIENT: 2 MMHG
ECHO LVOT PEAK VELOCITY: 0.7 M/S
ECHO LVOT STROKE VOLUME INDEX: 29.5 ML/M2
ECHO LVOT SV: 49.3 ML
ECHO LVOT VTI: 15.7 CM

## 2025-02-28 PROCEDURE — 1111F DSCHRG MED/CURRENT MED MERGE: CPT | Performed by: INTERNAL MEDICINE

## 2025-02-28 NOTE — CARE COORDINATION
Care Transitions Note    Initial Call - Call within 2 business days of discharge: Yes    Patient Current Location:  Home: [address] [place] [address].  [facility] [id number]    Care Transition Nurse contacted the family, daughter Ben, (PHI form verified; on file) by telephone to perform post hospital discharge assessment, verified name and  as identifiers. Provided introduction to self, and explanation of the Care Transition Nurse role.     Patient: Liane Booker    Patient : 4/15/1926   MRN: [id number]    Reason for Admission: Nodular calcific aortic valve stenosis  Discharge Date: 25  RURS: Readmission Risk Score: 18.4      Last Discharge Facility       Date Complaint Diagnosis Description Type Department Provider    25  Nodular calcific aortic valve stenosis ... Admission (Discharged) Michael Jade MD            Was this an external facility discharge? No    Additional needs identified to be addressed with provider   No needs identified             Method of communication with provider: none.    Patients top risk factors for readmission: depression, medical condition-s/p TAVR, CHF, HTN, CKD, anxiety, multiple health system providers, and polypharmacy    Interventions to address risk factors:   Education: CHF, blood pressure, s/p TAVR.  Review of patient management of conditions/medications.  Home Health: to resume with [facility] today per patient's daughter.   Referrals: declined to [facility] awaiting call back from cardiology (Dr Tiwari) regarding need for appointment.    Care Summary Note:   -Patient elective admission to [facility] on 25 for TAVR.  Symptoms prior to procedure were dyspnea, orthpnea, and LE edema.  -Patient's daughter pleasant in conversation.  Patient slept fairly well last night despite her chronic pain that extends from the neck down-Tylenol and Voltaren gel helps.    Patient is currently eating her breakfast.  Has SOB with exertion only.  Attempt made to check

## 2025-03-03 ENCOUNTER — OFFICE VISIT (OUTPATIENT)
Dept: CARDIOLOGY CLINIC | Age: 89
End: 2025-03-03
Payer: MEDICARE

## 2025-03-03 VITALS
HEART RATE: 74 BPM | SYSTOLIC BLOOD PRESSURE: 130 MMHG | WEIGHT: 148 LBS | BODY MASS INDEX: 26.22 KG/M2 | RESPIRATION RATE: 18 BRPM | DIASTOLIC BLOOD PRESSURE: 56 MMHG | HEIGHT: 63 IN

## 2025-03-03 DIAGNOSIS — I25.10 CORONARY ARTERY DISEASE INVOLVING NATIVE CORONARY ARTERY OF NATIVE HEART WITHOUT ANGINA PECTORIS: ICD-10-CM

## 2025-03-03 DIAGNOSIS — I42.9 CARDIOMYOPATHY, UNSPECIFIED TYPE (HCC): ICD-10-CM

## 2025-03-03 DIAGNOSIS — I10 PRIMARY HYPERTENSION: ICD-10-CM

## 2025-03-03 DIAGNOSIS — Z95.2 HISTORY OF TRANSCATHETER AORTIC VALVE REPLACEMENT (TAVR): Primary | ICD-10-CM

## 2025-03-03 DIAGNOSIS — I50.22 CHRONIC HFREF (HEART FAILURE WITH REDUCED EJECTION FRACTION) (HCC): ICD-10-CM

## 2025-03-03 PROCEDURE — 1123F ACP DISCUSS/DSCN MKR DOCD: CPT | Performed by: INTERNAL MEDICINE

## 2025-03-03 PROCEDURE — 99214 OFFICE O/P EST MOD 30 MIN: CPT | Performed by: INTERNAL MEDICINE

## 2025-03-03 PROCEDURE — 93000 ELECTROCARDIOGRAM COMPLETE: CPT | Performed by: INTERNAL MEDICINE

## 2025-03-03 RX ORDER — FUROSEMIDE 40 MG/1
40 TABLET ORAL DAILY
Qty: 90 TABLET | Refills: 1 | Status: SHIPPED
Start: 2025-03-03

## 2025-03-03 NOTE — PROGRESS NOTES
bruit.   Chest:   N/a   Lungs:   Mostly clear to auscultation bilaterally   Cardiovascular:  Regular rhythm, 2/6 systolic murmur, No S3, no palpable thrills.    Abdomen:  Soft, Bowel sounds normal   Extremities:  ++edema. Distal pulses palpable.   Skin:   Good turgor, warm and dry, no cyanosis.    Musculoskeletal: No joint swelling or deformity.   Neuro:   No focal neurologic deficit.   Psych:   Alert, good mood and effect.     REVIEW OF DIAGNOSTIC TESTS:        Electrocardiogram: Reviewed      Labs:  Reviewed as available     TAVR 2/26/2025 -Dr Tiwari  1. Successful R TF TAVR with 23 mm Cande 3 Ultra Resilia Valve (+1cc)  -No PVL   2. Final LVEDP 13 mmHg   Echo: Limited - 2/26/25 - EF 35-40%    Echo 3/22/24    Left Ventricle: Mildly reduced left ventricular systolic function with a visually estimated EF of 45 - 48%. Left ventricle size is normal. Findings consistent with mild concentric hypertrophy. Mild global hypokinesis present. Grade I diastolic dysfunction.    Aortic Valve: Severe aortic stenosis - Peak gradient 69mmHg, mean 41mmHg. Peak velocity 4.14m/s; AV area 1cm2 by continuity VTI and 1.1cm2 by Vmax; DLI 0.18    Mitral Valve: Mild annular calcification of the mitral valve. Mild regurgitation.    Tricuspid Valve: Trace regurgitation, estimated at RVSP 35mmHg.    Left Atrium: Left atrium is mildly dilated. Left atrial volume index is severely increased (>48 mL/m2).    Image quality is good.    Prior local study done 4/2023.     LHC/PCI and Echo - 2023 - noted      ASSESSMENT / PLAN:    Liane was seen today for follow-up from hospital.    Diagnoses and all orders for this visit:      Nonrheumatic aortic valve stenosis - Severe - Hx of syncope x4. Evaluated at [facility]. Initially she declined TAVR. s/p TAVR (23 mm Cande 3 Ultra Resilia Valve) - 2/26/2025 by Dr Tiwari - Follows with Dr Tiwari   -     EKG 12 Lead    Chronic HFrEF (heart failure with reduced ejection fraction) ([facility]) - EF 37 +/-5% by Echo 9/2023 -

## 2025-03-05 LAB
ABO/RH: NORMAL
ANTIBODY SCREEN: NEGATIVE
ARM BAND NUMBER: NORMAL
BLOOD BANK DISPENSE STATUS: NORMAL
BLOOD BANK SAMPLE EXPIRATION: NORMAL
BPU ID: NORMAL
COMPONENT: NORMAL
CROSSMATCH RESULT: NORMAL
TRANSFUSION STATUS: NORMAL
UNIT DIVISION: 0

## 2025-03-06 ENCOUNTER — HOSPITAL ENCOUNTER (OUTPATIENT)
Dept: OTHER | Age: 89
Setting detail: THERAPIES SERIES
Discharge: HOME | End: 2025-03-06
Payer: MEDICARE

## 2025-03-06 ENCOUNTER — TELEPHONE (OUTPATIENT)
Dept: CARDIOLOGY CLINIC | Age: 89
End: 2025-03-06

## 2025-03-06 VITALS
OXYGEN SATURATION: 100 % | HEART RATE: 68 BPM | WEIGHT: 145 LBS | DIASTOLIC BLOOD PRESSURE: 68 MMHG | RESPIRATION RATE: 18 BRPM | BODY MASS INDEX: 25.69 KG/M2 | SYSTOLIC BLOOD PRESSURE: 144 MMHG

## 2025-03-06 DIAGNOSIS — I50.20 HFREF (HEART FAILURE WITH REDUCED EJECTION FRACTION) (HCC): Primary | ICD-10-CM

## 2025-03-06 LAB
ANION GAP SERPL CALCULATED.3IONS-SCNC: 7 MMOL/L (ref 7–16)
BNP SERPL-MCNC: 5267 PG/ML (ref 0–450)
BUN SERPL-MCNC: 17 MG/DL (ref 6–23)
CALCIUM SERPL-MCNC: 9.2 MG/DL (ref 8.6–10.2)
CHLORIDE SERPL-SCNC: 92 MMOL/L (ref 98–107)
CO2 SERPL-SCNC: 35 MMOL/L (ref 22–29)
CREAT SERPL-MCNC: 1.1 MG/DL (ref 0.5–1)
GFR, ESTIMATED: 48 ML/MIN/1.73M2
GLUCOSE SERPL-MCNC: 110 MG/DL (ref 74–99)
POTASSIUM SERPL-SCNC: 4 MMOL/L (ref 3.5–5)
SODIUM SERPL-SCNC: 134 MMOL/L (ref 132–146)

## 2025-03-06 PROCEDURE — 6360000002 HC RX W HCPCS

## 2025-03-06 PROCEDURE — 36415 COLL VENOUS BLD VENIPUNCTURE: CPT

## 2025-03-06 PROCEDURE — 83880 ASSAY OF NATRIURETIC PEPTIDE: CPT

## 2025-03-06 PROCEDURE — 96374 THER/PROPH/DIAG INJ IV PUSH: CPT

## 2025-03-06 PROCEDURE — 99205 OFFICE O/P NEW HI 60 MIN: CPT

## 2025-03-06 PROCEDURE — 80048 BASIC METABOLIC PNL TOTAL CA: CPT

## 2025-03-06 RX ORDER — FUROSEMIDE 10 MG/ML
40 INJECTION INTRAMUSCULAR; INTRAVENOUS ONCE
Status: COMPLETED | OUTPATIENT
Start: 2025-03-06 | End: 2025-03-06

## 2025-03-06 RX ORDER — SODIUM CHLORIDE 0.9 % (FLUSH) 0.9 %
5-40 SYRINGE (ML) INJECTION 2 TIMES DAILY
Status: DISCONTINUED | OUTPATIENT
Start: 2025-03-06 | End: 2025-03-07 | Stop reason: HOSPADM

## 2025-03-06 RX ORDER — FUROSEMIDE 10 MG/ML
INJECTION INTRAMUSCULAR; INTRAVENOUS
Status: COMPLETED
Start: 2025-03-06 | End: 2025-03-06

## 2025-03-06 RX ADMIN — FUROSEMIDE 40 MG: 10 INJECTION, SOLUTION INTRAMUSCULAR; INTRAVENOUS at 15:04

## 2025-03-06 RX ADMIN — FUROSEMIDE 40 MG: 10 INJECTION INTRAMUSCULAR; INTRAVENOUS at 15:04

## 2025-03-06 NOTE — PLAN OF CARE
Problem: Chronic Conditions and Co-morbidities  Goal: Patient's chronic conditions and co-morbidity symptoms are monitored and maintained or improved  Outcome: Progressing      Imiquimod Counseling:  I discussed with the patient the risks of imiquimod including but not limited to erythema, scaling, itching, weeping, crusting, and pain.  Patient understands that the inflammatory response to imiquimod is variable from person to person and was educated regarded proper titration schedule.  If flu-like symptoms develop, patient knows to discontinue the medication and contact us.

## 2025-03-06 NOTE — PROGRESS NOTES
Congestive Heart Failure Clinic   [facility]      Referring Provider: Damian  Primary Care Physician: Linda Santiago MD   Cardiologist: Damian  Nephrologist:       HISTORY OF PRESENT ILLNESS:     Liane Booker is a 89 y.o. (4/15/1926) female with a history of HFrEF (EF < 40%)  echo 2/27/25 35-40%  Pre Cupid:     Lab Results   Component Value Date    LVEF 45 03/22/2024     Post Cupid:    Lab Results   Component Value Date    EFBP 34 (A) 02/15/2025         She presents to the CHF clinic for ongoing evaluation and monitoring of heart failure.    In the CHF clinic today she denies any adverse symptoms except:  [] Dizziness or lightheadedness   [] Syncope or near syncope  [x] Recent Fall  [] Shortness of breath at rest   [] Dyspnea with exertion  [] Decline in functional capacity (unable to perform activities they had previously been able to do)  [] Fatigue   [] Orthopnea  [] PND  [] Nocturnal cough  [] Hemoptysis  [] Chest pain, pressure, or discomfort  [] Palpitations  [] Abdominal distention  [] Abdominal bloating  [] Early satiety  [] Blood in stool   [] Diarrhea  [] Constipation  [] Nausea/Vomiting  [] Decreased urinary response to oral diuretic   [] Scrotal swelling   [x] Lower extremity edema  [] Used PRN doses of oral diuretic   [] Weight gain    Wt Readings from Last 3 Encounters:   03/06/25 65.8 kg (145 lb)   03/03/25 67.1 kg (148 lb)   02/26/25 65.5 kg (144 lb 6 oz)           SOCIAL HISTORY:  [x] Denies tobacco, alcohol or illicit drug abuse  [] Tobacco use:  [] ETOH use:  [] Illicit drug use:        MEDICATIONS:    Allergies   Allergen Reactions    Keflex [Cephalexin]     Reglan [Metoclopramide Hcl] Other (See Comments)     Makes me feel \"high\"     Prior to Visit Medications    Medication Sig Taking? Authorizing Provider   furosemide (LASIX) 40 MG tablet Take 1 tablet by mouth daily Yes Shaylee Salgado MD   midodrine (PROAMATINE)

## 2025-03-06 NOTE — TELEPHONE ENCOUNTER
Received alert from [employer] Heart Failure Monitoring System (HFMS) that patient has increased thoracic fluid index (TFI)  x 3 days.    Placed call to patient and daughter Corina answered and reports no increase in swelling, or shortness of breath-patient is still asleep.    Arriaga says patient has good urine response from current furosemide 40 mg daily which was increased 3/3/25 in office with Dr. Salgado due to 11 pound weight gain.    Arriaga says Liane has lost 1.5 pounds since furosemide increase and took 40 mg so far on  3/3/25, 3/4/25, and 3/5/25.    Patient scheduled for consult at [facility] 3/13/25 but instructed to go today for STAT appointment per ZANE Ann.    Arriaga agreeable and will bring patient today.    Future Appointments   Date Time Provider Department Center   3/6/2025  1:45 PM [facility] ROOM 4 [facility]   3/7/2025 11:15 AM Linda Santiago MD CB[facility] ECC DEP   3/11/2025 10:30 AM Cornelia Carbajal PA [place] CARDIO [facility]   3/13/2025 12:45 PM [facility] ROOM 4 [facility]   3/25/2025  9:00 AM [facility] ECHO 1 [facility] CARDIO [facility]   3/25/2025 10:00 AM Cornelia Carbajal PA [place] CARDIO [facility]   6/4/2025  1:30 PM Shaylee Salgado MD [facility] CARDIO [facility]   6/27/2025  1:00 PM Linda Santiago MD [facility] ECC DEP               Full HFMS TFI report uploaded into media.    Electronically signed by Aparna Benton RN, CHFN on 3/6/2025 at 10:27 AM

## 2025-03-07 ENCOUNTER — APPOINTMENT (OUTPATIENT)
Dept: ULTRASOUND IMAGING | Age: 89
End: 2025-03-07
Payer: MEDICARE

## 2025-03-07 ENCOUNTER — APPOINTMENT (OUTPATIENT)
Dept: GENERAL RADIOLOGY | Age: 89
End: 2025-03-07
Payer: MEDICARE

## 2025-03-07 ENCOUNTER — APPOINTMENT (OUTPATIENT)
Dept: CT IMAGING | Age: 89
End: 2025-03-07
Attending: EMERGENCY MEDICINE
Payer: MEDICARE

## 2025-03-07 ENCOUNTER — HOSPITAL ENCOUNTER (EMERGENCY)
Age: 89
Discharge: HOME | End: 2025-03-07
Attending: EMERGENCY MEDICINE
Payer: MEDICARE

## 2025-03-07 ENCOUNTER — TELEPHONE (OUTPATIENT)
Age: 89
End: 2025-03-07

## 2025-03-07 ENCOUNTER — TELEPHONE (OUTPATIENT)
Dept: CARDIOLOGY CLINIC | Age: 89
End: 2025-03-07

## 2025-03-07 VITALS
OXYGEN SATURATION: 99 % | RESPIRATION RATE: 16 BRPM | TEMPERATURE: 97.8 F | SYSTOLIC BLOOD PRESSURE: 131 MMHG | DIASTOLIC BLOOD PRESSURE: 56 MMHG | HEART RATE: 73 BPM

## 2025-03-07 DIAGNOSIS — S09.90XA CLOSED HEAD INJURY, INITIAL ENCOUNTER: ICD-10-CM

## 2025-03-07 DIAGNOSIS — S40.019A CONTUSION, SHOULDER AND UPPER ARM, MULTIPLE SITES, UNSPECIFIED LATERALITY, INITIAL ENCOUNTER: Primary | ICD-10-CM

## 2025-03-07 DIAGNOSIS — S30.1XXA HEMATOMA OF GROIN, INITIAL ENCOUNTER: ICD-10-CM

## 2025-03-07 DIAGNOSIS — S20.212A CONTUSION OF LEFT CHEST WALL, INITIAL ENCOUNTER: ICD-10-CM

## 2025-03-07 DIAGNOSIS — S70.02XA CONTUSION OF LEFT HIP, INITIAL ENCOUNTER: ICD-10-CM

## 2025-03-07 DIAGNOSIS — S40.029A CONTUSION, SHOULDER AND UPPER ARM, MULTIPLE SITES, UNSPECIFIED LATERALITY, INITIAL ENCOUNTER: Primary | ICD-10-CM

## 2025-03-07 LAB
ANION GAP SERPL CALCULATED.3IONS-SCNC: 13 MMOL/L (ref 7–16)
BASOPHILS # BLD: 0.06 K/UL (ref 0–0.2)
BASOPHILS NFR BLD: 1 % (ref 0–2)
BNP SERPL-MCNC: 4453 PG/ML (ref 0–450)
BUN SERPL-MCNC: 18 MG/DL (ref 6–23)
CALCIUM SERPL-MCNC: 9.1 MG/DL (ref 8.6–10.2)
CHLORIDE SERPL-SCNC: 94 MMOL/L (ref 98–107)
CO2 SERPL-SCNC: 33 MMOL/L (ref 22–29)
CREAT SERPL-MCNC: 1 MG/DL (ref 0.5–1)
EOSINOPHIL # BLD: 0.28 K/UL (ref 0.05–0.5)
EOSINOPHILS RELATIVE PERCENT: 4 % (ref 0–6)
ERYTHROCYTE [DISTWIDTH] IN BLOOD BY AUTOMATED COUNT: 14.5 % (ref 11.5–15)
GFR, ESTIMATED: 51 ML/MIN/1.73M2
GLUCOSE SERPL-MCNC: 109 MG/DL (ref 74–99)
HCT VFR BLD AUTO: 28.7 % (ref 34–48)
HGB BLD-MCNC: 9.1 G/DL (ref 11.5–15.5)
IMM GRANULOCYTES # BLD AUTO: 0.06 K/UL (ref 0–0.58)
IMM GRANULOCYTES NFR BLD: 1 % (ref 0–5)
LYMPHOCYTES NFR BLD: 1.06 K/UL (ref 1.5–4)
LYMPHOCYTES RELATIVE PERCENT: 16 % (ref 20–42)
MCH RBC QN AUTO: 31 PG (ref 26–35)
MCHC RBC AUTO-ENTMCNC: 31.7 G/DL (ref 32–34.5)
MCV RBC AUTO: 97.6 FL (ref 80–99.9)
MONOCYTES NFR BLD: 0.58 K/UL (ref 0.1–0.95)
MONOCYTES NFR BLD: 9 % (ref 2–12)
NEUTROPHILS NFR BLD: 70 % (ref 43–80)
NEUTS SEG NFR BLD: 4.81 K/UL (ref 1.8–7.3)
PLATELET # BLD AUTO: 248 K/UL (ref 130–450)
PMV BLD AUTO: 10.6 FL (ref 7–12)
POTASSIUM SERPL-SCNC: 3.5 MMOL/L (ref 3.5–5)
RBC # BLD AUTO: 2.94 M/UL (ref 3.5–5.5)
SODIUM SERPL-SCNC: 140 MMOL/L (ref 132–146)
WBC OTHER # BLD: 6.9 K/UL (ref 4.5–11.5)

## 2025-03-07 PROCEDURE — 70450 CT HEAD/BRAIN W/O DYE: CPT

## 2025-03-07 PROCEDURE — 93926 LOWER EXTREMITY STUDY: CPT

## 2025-03-07 PROCEDURE — 6370000000 HC RX 637 (ALT 250 FOR IP): Performed by: EMERGENCY MEDICINE

## 2025-03-07 PROCEDURE — 73090 X-RAY EXAM OF FOREARM: CPT

## 2025-03-07 PROCEDURE — 96374 THER/PROPH/DIAG INJ IV PUSH: CPT

## 2025-03-07 PROCEDURE — 73060 X-RAY EXAM OF HUMERUS: CPT

## 2025-03-07 PROCEDURE — 74176 CT ABD & PELVIS W/O CONTRAST: CPT

## 2025-03-07 PROCEDURE — 73110 X-RAY EXAM OF WRIST: CPT

## 2025-03-07 PROCEDURE — 99284 EMERGENCY DEPT VISIT MOD MDM: CPT

## 2025-03-07 PROCEDURE — 73030 X-RAY EXAM OF SHOULDER: CPT

## 2025-03-07 PROCEDURE — 73502 X-RAY EXAM HIP UNI 2-3 VIEWS: CPT

## 2025-03-07 PROCEDURE — 83880 ASSAY OF NATRIURETIC PEPTIDE: CPT

## 2025-03-07 PROCEDURE — 6360000002 HC RX W HCPCS: Performed by: EMERGENCY MEDICINE

## 2025-03-07 PROCEDURE — 73080 X-RAY EXAM OF ELBOW: CPT

## 2025-03-07 PROCEDURE — 71250 CT THORAX DX C-: CPT

## 2025-03-07 PROCEDURE — 72125 CT NECK SPINE W/O DYE: CPT

## 2025-03-07 PROCEDURE — 80048 BASIC METABOLIC PNL TOTAL CA: CPT

## 2025-03-07 PROCEDURE — 85025 COMPLETE CBC W/AUTO DIFF WBC: CPT

## 2025-03-07 RX ORDER — ACETAMINOPHEN 500 MG
1000 TABLET ORAL ONCE
Status: COMPLETED | OUTPATIENT
Start: 2025-03-07 | End: 2025-03-07

## 2025-03-07 RX ORDER — POTASSIUM CHLORIDE 1500 MG/1
40 TABLET, EXTENDED RELEASE ORAL ONCE
Status: COMPLETED | OUTPATIENT
Start: 2025-03-07 | End: 2025-03-07

## 2025-03-07 RX ORDER — MIDODRINE HYDROCHLORIDE 5 MG/1
2.5 TABLET ORAL ONCE
Status: COMPLETED | OUTPATIENT
Start: 2025-03-07 | End: 2025-03-07

## 2025-03-07 RX ORDER — GABAPENTIN 300 MG/1
300 CAPSULE ORAL 2 TIMES DAILY
COMMUNITY

## 2025-03-07 RX ORDER — CLOPIDOGREL BISULFATE 75 MG/1
75 TABLET ORAL ONCE
Status: COMPLETED | OUTPATIENT
Start: 2025-03-07 | End: 2025-03-07

## 2025-03-07 RX ORDER — FUROSEMIDE 10 MG/ML
40 INJECTION INTRAMUSCULAR; INTRAVENOUS ONCE
Status: COMPLETED | OUTPATIENT
Start: 2025-03-07 | End: 2025-03-07

## 2025-03-07 RX ORDER — TRAZODONE HYDROCHLORIDE 50 MG/1
25 TABLET ORAL NIGHTLY
COMMUNITY

## 2025-03-07 RX ORDER — ASPIRIN 81 MG/1
81 TABLET ORAL DAILY
COMMUNITY

## 2025-03-07 RX ADMIN — MIDODRINE HYDROCHLORIDE 2.5 MG: 5 TABLET ORAL at 14:47

## 2025-03-07 RX ADMIN — POTASSIUM CHLORIDE 40 MEQ: 1500 TABLET, EXTENDED RELEASE ORAL at 12:44

## 2025-03-07 RX ADMIN — ACETAMINOPHEN 1000 MG: 500 TABLET ORAL at 12:44

## 2025-03-07 RX ADMIN — CLOPIDOGREL BISULFATE 75 MG: 75 TABLET ORAL at 15:28

## 2025-03-07 RX ADMIN — FUROSEMIDE 40 MG: 10 INJECTION, SOLUTION INTRAMUSCULAR; INTRAVENOUS at 14:48

## 2025-03-07 ASSESSMENT — PAIN DESCRIPTION - ORIENTATION: ORIENTATION: LEFT

## 2025-03-07 ASSESSMENT — PAIN SCALES - GENERAL: PAINLEVEL_OUTOF10: 9

## 2025-03-07 ASSESSMENT — PAIN - FUNCTIONAL ASSESSMENT: PAIN_FUNCTIONAL_ASSESSMENT: 0-10

## 2025-03-07 ASSESSMENT — LIFESTYLE VARIABLES: HOW MANY STANDARD DRINKS CONTAINING ALCOHOL DO YOU HAVE ON A TYPICAL DAY: PATIENT DOES NOT DRINK

## 2025-03-07 ASSESSMENT — PAIN DESCRIPTION - DESCRIPTORS: DESCRIPTORS: ACHING

## 2025-03-07 NOTE — TELEPHONE ENCOUNTER
Patient daughter called stating patient had an echo done on 2/27/25. Does she still need to come to the echo appointment on 3/25/25.     Please advise

## 2025-03-07 NOTE — ED PROVIDER NOTES
Percentile Diastolic BP Percentile Temp Temp src Pulse Respirations SpO2   (!) 124/58 -- -- 97.8 °F (36.6 °C) -- 73 16 96 %      Height Weight         -- --                    Constitutional/General: Alert and oriented x3  Head: Normocephalic, atraumatic  Eyes: PERRL, EOMI, globes intact, no hyphema, no evidence of entrapment, conjunctiva pink  Mouth: Oropharynx clear, handling secretions, no trismus. No dental trauma, no oral trauma  Nose: No Septal hematoma  Neck:  No crepitus, no lacerations, abrasions, deformities, or stepoffs.  Back: No midline cervical spine tenderness. No thoracic spine tenderness. No lumbar spine tenderness. No Stepoffs, abrasions, lacerations, or deformities.  Pulmonary: Lungs clear to auscultation bilaterally, no wheezes, rales, or rhonchi. Not in respiratory distress  Cardiovascular:  Regular rate, no murmurs, gallops, or rubs. 2+ distal pulses  Chest: left lateral chest wall tenderness, no crepitus  Abdomen: Soft, non tender, non distended, no rebound, guarding, or rigidity. No pulsatile masses appreciated.  Right along the right inguinal ligament there is a small hematoma at the cath puncture site.  No obvious bruits or thrills. No pulsatile masses. No erythema or signs of cellulitis or abscess.   Extremities: Moves all extremities x 4. Warm and well perfused, no clubbing, cyanosis, or edema. Capillary refill <3 seconds.  Patient able to lift both arms and both legs without any weakness or focal deficits.    Left upper extremity: Patient has mild tenderness of the left upper extremity from the wrist to the shoulder.  There is no obvious deformity.  There is no lacerations or signs of open fracture.  There is no hand tenderness.  There is no scaphoid tenderness.  She has full range of motion of the upper extremity without any weakness.  She has normal hand grasp. Neurovascularly intact distally. 2+ radial pulses. Capillary refill <3 secondary. Warm and well perfused. Median, radial,  16     Temp: 97.8 °F (36.6 °C)     SpO2: 96% 94% 99%       Patient was given the following medications:  Medications   furosemide (LASIX) injection 40 mg (40 mg IntraVENous Given 3/7/25 1448)   potassium chloride (KLOR-CON M) extended release tablet 40 mEq (40 mEq Oral Given 3/7/25 1244)   acetaminophen (TYLENOL) tablet 1,000 mg (1,000 mg Oral Given 3/7/25 1244)   midodrine (PROAMATINE) tablet 2.5 mg (2.5 mg Oral Given 3/7/25 1447)   clopidogrel (PLAVIX) tablet 75 mg (75 mg Oral Given 3/7/25 1528)             Medical Decision Making/Differential Diagnosis:    CC/HPI Summary, Social Determinants of health, Records Reviewed, DDx, testing done/not done, ED Course, Reassessment, disposition considerations/shared decision making with patient, consults, disposition:       The following labs are interpreted by me:    Recent Results (from the past 24 hour(s))   CBC with Auto Differential    Collection Time: 03/07/25  8:21 AM   Result Value Ref Range    WBC 6.9 4.5 - 11.5 k/uL    RBC 2.94 (L) 3.50 - 5.50 m/uL    Hemoglobin 9.1 (L) 11.5 - 15.5 g/dL    Hematocrit 28.7 (L) 34.0 - 48.0 %    MCV 97.6 80.0 - 99.9 fL    MCH 31.0 26.0 - 35.0 pg    MCHC 31.7 (L) 32.0 - 34.5 g/dL    RDW 14.5 11.5 - 15.0 %    Platelets 248 130 - 450 k/uL    MPV 10.6 7.0 - 12.0 fL    Neutrophils % 70 43.0 - 80.0 %    Lymphocytes % 16 (L) 20.0 - 42.0 %    Monocytes % 9 2.0 - 12.0 %    Eosinophils % 4 0 - 6 %    Basophils % 1 0.0 - 2.0 %    Immature Granulocytes % 1 0.0 - 5.0 %    Neutrophils Absolute 4.81 1.80 - 7.30 k/uL    Lymphocytes Absolute 1.06 (L) 1.50 - 4.00 k/uL    Monocytes Absolute 0.58 0.10 - 0.95 k/uL    Eosinophils Absolute 0.28 0.05 - 0.50 k/uL    Basophils Absolute 0.06 0.00 - 0.20 k/uL    Immature Granulocytes Absolute 0.06 0.00 - 0.58 k/uL   Basic Metabolic Panel    Collection Time: 03/07/25  8:21 AM   Result Value Ref Range    Sodium 140 132 - 146 mmol/L    Potassium 3.5 3.5 - 5.0 mmol/L    Chloride 94 (L) 98 - 107 mmol/L    CO2 33 (H)

## 2025-03-07 NOTE — CARE COORDINATION
03/07/25 Case Management note: Chart reviewed for criteria as patient has returned to the ER within 30 days of discharge. Electronically signed by Eleni Espinoza RN CM on 3/7/2025 at 12:49 PM

## 2025-03-10 ENCOUNTER — TELEPHONE (OUTPATIENT)
Dept: OTHER | Age: 89
End: 2025-03-10

## 2025-03-10 ENCOUNTER — NURSE ONLY (OUTPATIENT)
Dept: PRIMARY CARE CLINIC | Age: 89
End: 2025-03-10
Payer: MEDICARE

## 2025-03-10 ENCOUNTER — CARE COORDINATION (OUTPATIENT)
Dept: CARE COORDINATION | Age: 89
End: 2025-03-10

## 2025-03-10 DIAGNOSIS — L75.0 URINARY BODY ODOR: Primary | ICD-10-CM

## 2025-03-10 LAB
BILIRUBIN, POC: NORMAL
BLOOD URINE, POC: NORMAL
CLARITY, POC: CLEAR
COLOR, POC: YELLOW
GLUCOSE URINE, POC: NORMAL MG/DL
KETONES, POC: NORMAL MG/DL
LEUKOCYTE EST, POC: NORMAL
NITRITE, POC: NORMAL
PH, POC: 6
PROTEIN, POC: NORMAL MG/DL
SPECIFIC GRAVITY, POC: 1.02
UROBILINOGEN, POC: 1 MG/DL

## 2025-03-10 PROCEDURE — 81002 URINALYSIS NONAUTO W/O SCOPE: CPT | Performed by: INTERNAL MEDICINE

## 2025-03-10 RX ORDER — POTASSIUM CHLORIDE 1500 MG/1
40 TABLET, EXTENDED RELEASE ORAL DAILY
Qty: 60 TABLET | Refills: 3 | Status: SHIPPED | OUTPATIENT
Start: 2025-03-10

## 2025-03-10 NOTE — TELEPHONE ENCOUNTER
3:26 PM spoke with family regarding medication change( verbal order given by Erica DEGROOT)    STOP LASIX  Start torsemide 40 mg daily  Start Potassium 40 mg daily     I have reviewed the provider's instructions with the patient, answering all questions to her satisfaction.    Electronically signed by Chastity Dodge RN on 3/10/2025 at 3:30 PM

## 2025-03-10 NOTE — CARE COORDINATION
Care Transitions Note    Care Transition and ER ([facility] 3/7/25) Follow Up Call     Patient Current Location:  Home: [address] [place] [address].  [place] [address]    Care Transition Nurse contacted the family, daughter Ben, (PHI form verified; on file) by telephone.    Additional needs identified to be addressed with provider   No needs identified                 Method of communication with provider: none.    Care Summary Note:   -Patient to [facility] ER on 3/7/25 after fall at home hitting head.  Having left rib pain and posterior head pain.  Testing negative.  -Patient 's daughter reports her mother is still having the left sided rib discomfort.  Ben herself has recovered from the covid-19.  -CTN learned today that patient has [place] oxygen at home from previous admission and has been wearing 2L continuous.  -Patient's daughter denies any needs at this time, was appreciative of call,  and is agreeable to continued follow up from care transitions.       Plan of care updates since last contact:  Home Health: active with [facility]   Completed cardiology appointment (Dr Salgado) on 3/3/25-EKG done, Lasix increased.  B/P 130/56, HR 74, weight 148 pounds at visit.  Had stat [facility] CHF clinic visit on 3/6/25 after Zoll HFMS alert-hypervolemic.  B/P 144/68, HR 68, weight 145 pounds, oximeter 100% at visit.  Received IV Lasix.         Advance Care Planning:   Does patient have an Advance Directive: health care decision maker confirmed on a prior call.    Medication Review:  Full medication reconciliation completed during previous call.  Medications changed since last call, reviewed today-patient's daughter currently at the pharmacy picking up new medications.   States Lasix to be stopped and to begin Torsemide and Potassium.    Remote Patient Monitoring:  Offered patient enrollment in the Remote Patient Monitoring (RPM) program for in-home monitoring: Addressed on a prior call.    Assessments:  Care Transitions Subsequent and Final Call

## 2025-03-11 ENCOUNTER — OFFICE VISIT (OUTPATIENT)
Dept: CARDIOLOGY CLINIC | Age: 89
End: 2025-03-11
Payer: MEDICARE

## 2025-03-11 ENCOUNTER — RESULTS FOLLOW-UP (OUTPATIENT)
Dept: PRIMARY CARE CLINIC | Age: 89
End: 2025-03-11

## 2025-03-11 VITALS
SYSTOLIC BLOOD PRESSURE: 130 MMHG | WEIGHT: 145.2 LBS | HEART RATE: 76 BPM | RESPIRATION RATE: 16 BRPM | HEIGHT: 61 IN | OXYGEN SATURATION: 99 % | BODY MASS INDEX: 27.41 KG/M2 | DIASTOLIC BLOOD PRESSURE: 80 MMHG

## 2025-03-11 DIAGNOSIS — Z95.2 HISTORY OF TRANSCATHETER AORTIC VALVE REPLACEMENT (TAVR): Primary | ICD-10-CM

## 2025-03-11 DIAGNOSIS — I35.0 NONRHEUMATIC AORTIC VALVE STENOSIS: ICD-10-CM

## 2025-03-11 PROCEDURE — 1125F AMNT PAIN NOTED PAIN PRSNT: CPT | Performed by: PHYSICIAN ASSISTANT

## 2025-03-11 PROCEDURE — 99213 OFFICE O/P EST LOW 20 MIN: CPT | Performed by: PHYSICIAN ASSISTANT

## 2025-03-11 PROCEDURE — 1123F ACP DISCUSS/DSCN MKR DOCD: CPT | Performed by: PHYSICIAN ASSISTANT

## 2025-03-11 PROCEDURE — 1159F MED LIST DOCD IN RCRD: CPT | Performed by: PHYSICIAN ASSISTANT

## 2025-03-11 RX ORDER — CIPROFLOXACIN 500 MG/1
500 TABLET, FILM COATED ORAL 2 TIMES DAILY
Qty: 14 TABLET | Refills: 0 | Status: SHIPPED | OUTPATIENT
Start: 2025-03-11 | End: 2025-03-18

## 2025-03-11 NOTE — PROGRESS NOTES
[facility]  Visit Note      Patient name: Liane Booker    Reason for visit: TAVR follow up     Referring Physician: Dr. Salgado    Primary Care Physician: Linda Santiago MD    Date of service: 3/11/2025    Chief Complaint: TAVR follow up - groin check    HPI: Mrs. Booker presents for follow up s/p TAVR on 2/26/25. She is doing well since the procedure. She denies chest pain, sob/dorantes, orthopnea, PND, LE edema or groin complaints. She had a fall at home (lost her balance getting clothes out of her closet) and was in ER on 3/7/25. CT head was negative. She did not require admission.     Allergies:   Allergies   Allergen Reactions    Keflex [Cephalexin]     Reglan [Metoclopramide Hcl] Other (See Comments)     Makes me feel \"high\"       Home medications:    Current Outpatient Medications   Medication Sig Dispense Refill    torsemide 40 MG TABS Take 40 mg by mouth daily 30 tablet 3    potassium chloride (KLOR-CON M) 20 MEQ extended release tablet Take 2 tablets by mouth daily 60 tablet 3    aspirin 81 MG EC tablet Take 1 tablet by mouth daily      gabapentin (NEURONTIN) 300 MG capsule Take 1 capsule by mouth 2 times daily.      traZODone (DESYREL) 50 MG tablet Take 0.5 tablets by mouth nightly      midodrine (PROAMATINE) 2.5 MG tablet Take 1 tablet by mouth 3 times daily (with meals) 90 tablet 1    buPROPion (WELLBUTRIN XL) 150 MG extended release tablet Take 1 tablet by mouth once daily 90 tablet 0    levothyroxine (SYNTHROID) 125 MCG tablet Take 1 tablet by mouth once daily 90 tablet 0    omeprazole (PRILOSEC) 40 MG delayed release capsule Take 1 capsule by mouth once daily 90 capsule 0    metoprolol succinate (TOPROL XL) 25 MG extended release tablet Take 0.5 tablets by mouth daily 45 tablet 1    atorvastatin (LIPITOR) 40 MG tablet Take 1 tablet by mouth daily 90 tablet 0    losartan (COZAAR) 25 MG tablet Take 1/2 (one-half) tablet by mouth once daily 45 tablet 0    clopidogrel (PLAVIX)

## 2025-03-11 NOTE — PATIENT INSTRUCTIONS
Follow up on 3/25/25 at 9:00 am for echocardiogram and visit with Cornelia; call sooner if concerns arise in the meantime

## 2025-03-12 ENCOUNTER — TELEPHONE (OUTPATIENT)
Dept: PRIMARY CARE CLINIC | Age: 89
End: 2025-03-12

## 2025-03-12 LAB
CULTURE: ABNORMAL
SPECIMEN DESCRIPTION: ABNORMAL

## 2025-03-12 NOTE — TELEPHONE ENCOUNTER
Courtney from [facility] called in to inform Dr. Santiago that the patients daughter called to let them know she had a fall last week and the patient is still in a lot of pain. They cancelled the therapy for this week. Courtney said she will call the patient on Monday to see how she's doing and if they can resume therapy.     MAHENDRA

## 2025-03-13 ENCOUNTER — RESULTS FOLLOW-UP (OUTPATIENT)
Dept: OTHER | Age: 89
End: 2025-03-13

## 2025-03-13 ENCOUNTER — TELEPHONE (OUTPATIENT)
Dept: OTHER | Age: 89
End: 2025-03-13

## 2025-03-13 ENCOUNTER — CLINICAL DOCUMENTATION (OUTPATIENT)
Dept: CARDIOLOGY CLINIC | Age: 89
End: 2025-03-13

## 2025-03-13 ENCOUNTER — HOSPITAL ENCOUNTER (OUTPATIENT)
Dept: OTHER | Age: 89
Setting detail: THERAPIES SERIES
Discharge: HOME | End: 2025-03-13
Payer: MEDICARE

## 2025-03-13 VITALS
BODY MASS INDEX: 27.4 KG/M2 | OXYGEN SATURATION: 99 % | SYSTOLIC BLOOD PRESSURE: 130 MMHG | WEIGHT: 145 LBS | DIASTOLIC BLOOD PRESSURE: 60 MMHG | RESPIRATION RATE: 18 BRPM | HEART RATE: 66 BPM

## 2025-03-13 DIAGNOSIS — I48.0 PAROXYSMAL ATRIAL FIBRILLATION (HCC): Primary | ICD-10-CM

## 2025-03-13 DIAGNOSIS — I50.20 HFREF (HEART FAILURE WITH REDUCED EJECTION FRACTION) (HCC): Primary | ICD-10-CM

## 2025-03-13 LAB
ANION GAP SERPL CALCULATED.3IONS-SCNC: 9 MMOL/L (ref 7–16)
BNP SERPL-MCNC: 2597 PG/ML (ref 0–450)
BUN SERPL-MCNC: 29 MG/DL (ref 6–23)
CALCIUM SERPL-MCNC: 9.5 MG/DL (ref 8.6–10.2)
CHLORIDE SERPL-SCNC: 93 MMOL/L (ref 98–107)
CO2 SERPL-SCNC: 31 MMOL/L (ref 22–29)
CREAT SERPL-MCNC: 1.4 MG/DL (ref 0.5–1)
GFR, ESTIMATED: 34 ML/MIN/1.73M2
GLUCOSE SERPL-MCNC: 122 MG/DL (ref 74–99)
POTASSIUM SERPL-SCNC: 5.3 MMOL/L (ref 3.5–5)
SODIUM SERPL-SCNC: 133 MMOL/L (ref 132–146)

## 2025-03-13 PROCEDURE — 99214 OFFICE O/P EST MOD 30 MIN: CPT

## 2025-03-13 PROCEDURE — 96374 THER/PROPH/DIAG INJ IV PUSH: CPT

## 2025-03-13 PROCEDURE — 6360000002 HC RX W HCPCS

## 2025-03-13 PROCEDURE — 80048 BASIC METABOLIC PNL TOTAL CA: CPT

## 2025-03-13 PROCEDURE — 2500000003 HC RX 250 WO HCPCS: Performed by: NURSE PRACTITIONER

## 2025-03-13 PROCEDURE — 83880 ASSAY OF NATRIURETIC PEPTIDE: CPT

## 2025-03-13 PROCEDURE — 36415 COLL VENOUS BLD VENIPUNCTURE: CPT

## 2025-03-13 RX ORDER — SODIUM CHLORIDE 0.9 % (FLUSH) 0.9 %
10 SYRINGE (ML) INJECTION ONCE
Status: COMPLETED | OUTPATIENT
Start: 2025-03-13 | End: 2025-03-13

## 2025-03-13 RX ORDER — BUMETANIDE 0.25 MG/ML
2 INJECTION, SOLUTION INTRAMUSCULAR; INTRAVENOUS ONCE
Status: COMPLETED | OUTPATIENT
Start: 2025-03-13 | End: 2025-03-13

## 2025-03-13 RX ORDER — BUMETANIDE 0.25 MG/ML
INJECTION, SOLUTION INTRAMUSCULAR; INTRAVENOUS
Status: COMPLETED
Start: 2025-03-13 | End: 2025-03-13

## 2025-03-13 RX ADMIN — SODIUM CHLORIDE, PRESERVATIVE FREE 10 ML: 5 INJECTION INTRAVENOUS at 13:35

## 2025-03-13 RX ADMIN — BUMETANIDE 2 MG: 0.25 INJECTION INTRAMUSCULAR; INTRAVENOUS at 13:35

## 2025-03-13 RX ADMIN — BUMETANIDE 2 MG: 0.25 INJECTION, SOLUTION INTRAMUSCULAR; INTRAVENOUS at 13:35

## 2025-03-13 NOTE — PROGRESS NOTES
Congestive Heart Failure Clinic   [facility]      Referring Provider: Damian  Primary Care Physician: Linda Santiago MD   Cardiologist: Damian  Nephrologist:       HISTORY OF PRESENT ILLNESS:     Liane Booker is a 89 y.o. (4/15/1926) female with a history of HFrEF (EF < 40%)  echo 2/27/25 35-40%  Pre Cupid:     Lab Results   Component Value Date    LVEF 45 03/22/2024     Post Cupid:    Lab Results   Component Value Date    EFBP 34 (A) 02/15/2025         She presents to the CHF clinic for ongoing evaluation and monitoring of heart failure.    In the CHF clinic today she denies any adverse symptoms except:  [] Dizziness or lightheadedness   [] Syncope or near syncope  [x] Recent Fall- slid on wooden floor   [] Shortness of breath at rest   [] Dyspnea with exertion  [] Decline in functional capacity (unable to perform activities they had previously been able to do)  [] Fatigue   [] Orthopnea  [] PND  [] Nocturnal cough  [] Hemoptysis  [] Chest pain, pressure, or discomfort  [] Palpitations  [] Abdominal distention  [] Abdominal bloating  [] Early satiety  [] Blood in stool   [] Diarrhea  [] Constipation  [] Nausea/Vomiting  [] Decreased urinary response to oral diuretic   [] Scrotal swelling   [x] Lower extremity edema  [] Used PRN doses of oral diuretic   [] Weight gain    Wt Readings from Last 3 Encounters:   03/13/25 65.8 kg (145 lb)   03/11/25 65.9 kg (145 lb 3.2 oz)   03/06/25 65.8 kg (145 lb)           SOCIAL HISTORY:  [x] Denies tobacco, alcohol or illicit drug abuse  [] Tobacco use:  [] ETOH use:  [] Illicit drug use:        MEDICATIONS:    Allergies   Allergen Reactions    Keflex [Cephalexin]     Reglan [Metoclopramide Hcl] Other (See Comments)     Makes me feel \"high\"     Prior to Visit Medications    Medication Sig Taking? Authorizing Provider   torsemide 40 MG TABS Take 40 mg by mouth daily Yes Erica Chand, APRN - CNP

## 2025-03-13 NOTE — TELEPHONE ENCOUNTER
3:53 PM  Spoke with patients daughter Star regarding medication recommendations/adjustments by Erica DEGROOT from today's CHF clinic appt:    Stop potassium   Decrease Torsemide 20 mg daily   Stay hydrated   Follow up in CHF clinic in 1 week     I have reviewed the provider's instructions with the patient, answering all questions to her satisfaction.    Electronically signed by Samia Redd RN on 3/13/2025 at 3:56 PM

## 2025-03-13 NOTE — RESULT ENCOUNTER NOTE
Labs and CHF clinic note reviewed  Stop potassium   Decrease Torsemide 20 mg daily   Stay hydrated  Follow up in CHF clinic in 1 week     Thank you

## 2025-03-13 NOTE — PROGRESS NOTES
Patient has a ZOLL heart failure monitoring system (HFMS) which alerted to elevated thoracic fluid index today.     Notified cardiology-ZANE Ann for review and report uploaded into epic media.

## 2025-03-18 ENCOUNTER — OFFICE VISIT (OUTPATIENT)
Dept: PRIMARY CARE CLINIC | Age: 89
End: 2025-03-18
Payer: MEDICARE

## 2025-03-18 VITALS
TEMPERATURE: 97.6 F | SYSTOLIC BLOOD PRESSURE: 110 MMHG | HEIGHT: 61 IN | DIASTOLIC BLOOD PRESSURE: 52 MMHG | BODY MASS INDEX: 27.19 KG/M2 | WEIGHT: 144 LBS | OXYGEN SATURATION: 99 % | HEART RATE: 67 BPM

## 2025-03-18 DIAGNOSIS — Z95.2 H/O AORTIC VALVE REPLACEMENT: ICD-10-CM

## 2025-03-18 DIAGNOSIS — Z86.79 HX OF HYPOTENSION: ICD-10-CM

## 2025-03-18 DIAGNOSIS — N18.9 CHRONIC KIDNEY DISEASE, UNSPECIFIED CKD STAGE: Primary | ICD-10-CM

## 2025-03-18 DIAGNOSIS — I50.43 ACUTE ON CHRONIC COMBINED SYSTOLIC AND DIASTOLIC CHF (CONGESTIVE HEART FAILURE) (HCC): ICD-10-CM

## 2025-03-18 DIAGNOSIS — S20.212D CONTUSION OF RIB ON LEFT SIDE, SUBSEQUENT ENCOUNTER: ICD-10-CM

## 2025-03-18 PROCEDURE — 1159F MED LIST DOCD IN RCRD: CPT | Performed by: INTERNAL MEDICINE

## 2025-03-18 PROCEDURE — 1123F ACP DISCUSS/DSCN MKR DOCD: CPT | Performed by: INTERNAL MEDICINE

## 2025-03-18 PROCEDURE — 99214 OFFICE O/P EST MOD 30 MIN: CPT | Performed by: INTERNAL MEDICINE

## 2025-03-18 RX ORDER — METHOCARBAMOL 750 MG/1
TABLET, FILM COATED ORAL
COMMUNITY
Start: 2025-01-29

## 2025-03-18 RX ORDER — HYDROCODONE BITARTRATE AND ACETAMINOPHEN 5; 325 MG/1; MG/1
1 TABLET ORAL EVERY 4 HOURS PRN
Qty: 21 TABLET | Refills: 0 | Status: SHIPPED | OUTPATIENT
Start: 2025-03-18 | End: 2025-03-25

## 2025-03-18 RX ORDER — TORSEMIDE 20 MG/1
40 TABLET ORAL DAILY
COMMUNITY
Start: 2025-03-10

## 2025-03-18 NOTE — PROGRESS NOTES
hours as needed for Pain for up to 7 days. Intended supply: 3 days. Take lowest dose possible to manage pain Max Daily Amount: 6 tablets    Hx of hypotension  Comments:  During hospitalization, good bp at home, advised to wean off Midodrine one tablet at the time monitoring bp.    H/O aortic valve replacement  Comments:  Recuperating well    Acute on chronic combined systolic and diastolic CHF (congestive heart failure) ([facility])  Comments:  Continue to follow up at the CHF Clnic

## 2025-03-19 ENCOUNTER — TELEPHONE (OUTPATIENT)
Dept: PRIMARY CARE CLINIC | Age: 89
End: 2025-03-19

## 2025-03-19 ENCOUNTER — CARE COORDINATION (OUTPATIENT)
Dept: CARE COORDINATION | Age: 89
End: 2025-03-19

## 2025-03-19 PROBLEM — R06.02 SHORTNESS OF BREATH: Status: RESOLVED | Noted: 2025-02-13 | Resolved: 2025-03-19

## 2025-03-19 NOTE — CARE COORDINATION
referral to ambulatory care manager-explain service and assess interest in enrolling.  Final call.     Rhiannon Monroe RN      145.1

## 2025-03-19 NOTE — TELEPHONE ENCOUNTER
Courtney from [facility] wanted to let Dr. Santiago know that Liane did not have physical therapy in the [place] last week or this week because she states she is in too much pain. She has called to try to go out to the home and is told not to come because of the pain. States they will try again next week. She did tell Liane if the pain persists she may need to call our office and make an appointment to see Dr. Santiago again. Thank you.

## 2025-03-21 ENCOUNTER — TELEPHONE (OUTPATIENT)
Age: 89
End: 2025-03-21

## 2025-03-21 ENCOUNTER — HOSPITAL ENCOUNTER (OUTPATIENT)
Dept: OTHER | Age: 89
Setting detail: THERAPIES SERIES
Discharge: HOME | End: 2025-03-21
Payer: MEDICARE

## 2025-03-21 VITALS
OXYGEN SATURATION: 98 % | DIASTOLIC BLOOD PRESSURE: 61 MMHG | SYSTOLIC BLOOD PRESSURE: 124 MMHG | WEIGHT: 146.2 LBS | HEART RATE: 78 BPM | BODY MASS INDEX: 27.62 KG/M2 | RESPIRATION RATE: 18 BRPM

## 2025-03-21 DIAGNOSIS — N18.9 CHRONIC KIDNEY DISEASE, UNSPECIFIED CKD STAGE: ICD-10-CM

## 2025-03-21 LAB
ALBUMIN SERPL-MCNC: 3.7 G/DL (ref 3.5–5.2)
ALP SERPL-CCNC: 113 U/L (ref 35–104)
ALT SERPL-CCNC: 16 U/L (ref 0–32)
ANION GAP SERPL CALCULATED.3IONS-SCNC: 12 MMOL/L (ref 7–16)
AST SERPL-CCNC: 26 U/L (ref 0–31)
BILIRUB SERPL-MCNC: 0.3 MG/DL (ref 0–1.2)
BNP SERPL-MCNC: 3394 PG/ML (ref 0–450)
BUN SERPL-MCNC: 21 MG/DL (ref 6–23)
CALCIUM SERPL-MCNC: 9 MG/DL (ref 8.6–10.2)
CHLORIDE SERPL-SCNC: 93 MMOL/L (ref 98–107)
CO2 SERPL-SCNC: 28 MMOL/L (ref 22–29)
CREAT SERPL-MCNC: 1.1 MG/DL (ref 0.5–1)
GFR, ESTIMATED: 45 ML/MIN/1.73M2
GLUCOSE SERPL-MCNC: 119 MG/DL (ref 74–99)
POTASSIUM SERPL-SCNC: 4.4 MMOL/L (ref 3.5–5)
PROT SERPL-MCNC: 6.3 G/DL (ref 6.4–8.3)
SODIUM SERPL-SCNC: 133 MMOL/L (ref 132–146)

## 2025-03-21 PROCEDURE — 36415 COLL VENOUS BLD VENIPUNCTURE: CPT

## 2025-03-21 PROCEDURE — 80053 COMPREHEN METABOLIC PANEL: CPT

## 2025-03-21 PROCEDURE — 99214 OFFICE O/P EST MOD 30 MIN: CPT

## 2025-03-21 PROCEDURE — 83880 ASSAY OF NATRIURETIC PEPTIDE: CPT

## 2025-03-21 NOTE — PROGRESS NOTES
Congestive Heart Failure Clinic   [facility]      Referring Provider: Damian  Primary Care Physician: Linda Santiago MD   Cardiologist: Damian  Nephrologist:       HISTORY OF PRESENT ILLNESS:    Liane Booker is a 89 y.o. (4/15/1926) female with a history of HFrEF (EF < 40%)  echo 2/27/25 35-40%  Pre Cupid:     Lab Results   Component Value Date    LVEF 45 03/22/2024     Post Cupid:    Lab Results   Component Value Date    EFBP 34 (A) 02/15/2025     She presents to the CHF clinic for ongoing evaluation and monitoring of heart failure.  In the CHF clinic today she denies any adverse symptoms except:  [] Dizziness or lightheadedness   [] Syncope or near syncope  [x] Recent Fall- slid on wooden floor   [] Shortness of breath at rest   [] Dyspnea with exertion  [] Decline in functional capacity (unable to perform activities they had previously been able to do)  [] Fatigue   [] Orthopnea  [] PND  [] Nocturnal cough  [] Hemoptysis  [] Chest pain, pressure, or discomfort  [] Palpitations  [] Abdominal distention  [] Abdominal bloating  [] Early satiety  [] Blood in stool   [] Diarrhea  [] Constipation  [] Nausea/Vomiting  [] Decreased urinary response to oral diuretic   [] Scrotal swelling   [x] Lower extremity edema  [] Used PRN doses of oral diuretic   [] Weight gain    Wt Readings from Last 3 Encounters:   03/21/25 66.3 kg (146 lb 3.2 oz)   03/18/25 65.3 kg (144 lb)   03/13/25 65.8 kg (145 lb)     SOCIAL HISTORY:  [x] Denies tobacco, alcohol or illicit drug abuse  [] Tobacco use:  [] ETOH use:  [] Illicit drug use:        MEDICATIONS:    Allergies   Allergen Reactions    Keflex [Cephalexin]     Reglan [Metoclopramide Hcl] Other (See Comments)     Makes me feel \"high\"     Prior to Visit Medications    Medication Sig Taking? Authorizing Provider   Torsemide 40 MG TABS Take 20 mg by mouth daily Yes Erica Chand, APRN - CNP   methocarbamol

## 2025-03-21 NOTE — TELEPHONE ENCOUNTER
Left voicemail reminding of patient's appointment on 3/25/25 at [facility].  Echocardiogram at 0900, visit to follow with Cornelia Carbajal.

## 2025-03-24 DIAGNOSIS — Z95.2 HISTORY OF TRANSCATHETER AORTIC VALVE REPLACEMENT (TAVR): Primary | ICD-10-CM

## 2025-03-24 NOTE — PROGRESS NOTES
[facility]  Visit Note      Patient name: Liane Booker    Reason for visit: TAVR follow up     Referring Physician: Dr. Salgado    Primary Care Physician: Linda Santiago MD    Date of service: 3/25/2025    Chief Complaint: TAVR follow up - 30 day     HPI: Mrs. Booker presents for follow up s/p TAVR on 2/26/25. She is doing well since the procedure. She denies chest pain, sob/dorantes, orthopnea, PND, LE edema or groin complaints.     Allergies:   Allergies   Allergen Reactions    Keflex [Cephalexin]     Reglan [Metoclopramide Hcl] Other (See Comments)     Makes me feel \"high\"       Home medications:    Current Outpatient Medications   Medication Sig Dispense Refill    OXYGEN Inhale 2 L into the lungs continuous      methocarbamol (ROBAXIN) 750 MG tablet TAKE 1 TABLET BY MOUTH THREE TIMES DAILY FOR MUSCLE SPASM      HYDROcodone-acetaminophen (NORCO) 5-325 MG per tablet Take 1 tablet by mouth every 4 hours as needed for Pain for up to 7 days. Intended supply: 3 days. Take lowest dose possible to manage pain Max Daily Amount: 6 tablets 21 tablet 0    Torsemide 40 MG TABS Take 20 mg by mouth daily 30 tablet 3    aspirin 81 MG EC tablet Take 1 tablet by mouth daily      gabapentin (NEURONTIN) 300 MG capsule Take 1 capsule by mouth 2 times daily.      midodrine (PROAMATINE) 2.5 MG tablet Take 1 tablet by mouth 3 times daily (with meals) (Patient taking differently: Take 1 tablet by mouth 2 times daily at 0800 and 1400) 90 tablet 1    buPROPion (WELLBUTRIN XL) 150 MG extended release tablet Take 1 tablet by mouth once daily 90 tablet 0    levothyroxine (SYNTHROID) 125 MCG tablet Take 1 tablet by mouth once daily 90 tablet 0    omeprazole (PRILOSEC) 40 MG delayed release capsule Take 1 capsule by mouth once daily 90 capsule 0    metoprolol succinate (TOPROL XL) 25 MG extended release tablet Take 0.5 tablets by mouth daily 45 tablet 1    atorvastatin (LIPITOR) 40 MG tablet Take 1 tablet by mouth

## 2025-03-25 ENCOUNTER — OFFICE VISIT (OUTPATIENT)
Dept: CARDIOLOGY CLINIC | Age: 89
End: 2025-03-25
Payer: MEDICARE

## 2025-03-25 ENCOUNTER — HOSPITAL ENCOUNTER (OUTPATIENT)
Dept: CARDIOLOGY | Age: 89
Discharge: HOME | End: 2025-03-27
Payer: MEDICARE

## 2025-03-25 VITALS
HEART RATE: 72 BPM | SYSTOLIC BLOOD PRESSURE: 130 MMHG | DIASTOLIC BLOOD PRESSURE: 80 MMHG | WEIGHT: 144 LBS | RESPIRATION RATE: 16 BRPM | BODY MASS INDEX: 28.27 KG/M2 | HEIGHT: 60 IN

## 2025-03-25 DIAGNOSIS — I35.0 NONRHEUMATIC AORTIC VALVE STENOSIS: ICD-10-CM

## 2025-03-25 DIAGNOSIS — Z95.2 HISTORY OF TRANSCATHETER AORTIC VALVE REPLACEMENT (TAVR): ICD-10-CM

## 2025-03-25 DIAGNOSIS — Z95.2 HISTORY OF TRANSCATHETER AORTIC VALVE REPLACEMENT (TAVR): Primary | ICD-10-CM

## 2025-03-25 LAB
ECHO AO ASC DIAM: 2.6 CM
ECHO AV ACCELERATION TIME: 87.66 MS
ECHO AV AREA PEAK VELOCITY: 1.5 CM2
ECHO AV AREA VTI: 1.5 CM2
ECHO AV MEAN GRADIENT: 9 MMHG
ECHO AV MEAN VELOCITY: 1.4 M/S
ECHO AV PEAK GRADIENT: 16 MMHG
ECHO AV PEAK VELOCITY: 2 M/S
ECHO AV VELOCITY RATIO: 0.5
ECHO AV VTI: 40.7 CM
ECHO EST RA PRESSURE: 3 MMHG
ECHO LA DIAMETER: 4.4 CM
ECHO LA VOL A-L A2C: 73 ML (ref 22–52)
ECHO LA VOL A-L A4C: 103 ML (ref 22–52)
ECHO LA VOL MOD A2C: 73 ML (ref 22–52)
ECHO LA VOL MOD A4C: 100 ML (ref 22–52)
ECHO LA VOLUME AREA LENGTH: 90 ML
ECHO LV E' LATERAL VELOCITY: 6 CM/S
ECHO LV E' SEPTAL VELOCITY: 4 CM/S
ECHO LV EDV A2C: 82 ML
ECHO LV EDV A4C: 84 ML
ECHO LV EDV BP: 83 ML (ref 56–104)
ECHO LV EF PHYSICIAN: 45 %
ECHO LV EJECTION FRACTION A2C: 35 %
ECHO LV EJECTION FRACTION A4C: 39 %
ECHO LV ESV A2C: 54 ML
ECHO LV ESV A4C: 51 ML
ECHO LV ESV BP: 55 ML (ref 19–49)
ECHO LV FRACTIONAL SHORTENING: 18 % (ref 28–44)
ECHO LV INTERNAL DIMENSION DIASTOLIC: 4.9 CM (ref 3.9–5.3)
ECHO LV INTERNAL DIMENSION SYSTOLIC: 4 CM
ECHO LV IVSD: 1.2 CM (ref 0.6–0.9)
ECHO LV IVSS: 1.2 CM
ECHO LV MASS 2D: 226.4 G (ref 67–162)
ECHO LV POSTERIOR WALL DIASTOLIC: 1.2 CM (ref 0.6–0.9)
ECHO LV POSTERIOR WALL SYSTOLIC: 1.2 CM
ECHO LV RELATIVE WALL THICKNESS RATIO: 0.49
ECHO LVOT AREA: 3.1 CM2
ECHO LVOT AV VTI INDEX: 0.5
ECHO LVOT DIAM: 2 CM
ECHO LVOT MEAN GRADIENT: 2 MMHG
ECHO LVOT PEAK GRADIENT: 4 MMHG
ECHO LVOT PEAK VELOCITY: 1 M/S
ECHO LVOT SV: 64.4 ML
ECHO LVOT VTI: 20.5 CM
ECHO MV "A" WAVE DURATION: 161.5 MSEC
ECHO MV A VELOCITY: 1.35 M/S
ECHO MV AREA PHT: 3.1 CM2
ECHO MV AREA VTI: 1.8 CM2
ECHO MV E DECELERATION TIME (DT): 234.8 MS
ECHO MV E VELOCITY: 1.34 M/S
ECHO MV E/A RATIO: 0.99
ECHO MV E/E' LATERAL: 22.33
ECHO MV E/E' RATIO (AVERAGED): 27.92
ECHO MV E/E' SEPTAL: 33.5
ECHO MV EROA PISA: 0.1 CM2
ECHO MV LVOT VTI INDEX: 1.71
ECHO MV MAX VELOCITY: 1.3 M/S
ECHO MV MEAN GRADIENT: 3 MMHG
ECHO MV MEAN VELOCITY: 0.8 M/S
ECHO MV PEAK GRADIENT: 7 MMHG
ECHO MV PRESSURE HALF TIME (PHT): 70.9 MS
ECHO MV REGURGITANT ALIASING (NYQUIST) VELOCITY: 32 CM/S
ECHO MV REGURGITANT RADIUS PISA: 0.51 CM
ECHO MV REGURGITANT VELOCITY PISA: 4.3 M/S
ECHO MV REGURGITANT VOLUME PISA: 17.89 ML
ECHO MV REGURGITANT VTIA: 147.2 CM
ECHO MV VTI: 35.1 CM
ECHO PV MAX VELOCITY: 0.7 M/S
ECHO PV MEAN GRADIENT: 1 MMHG
ECHO PV MEAN VELOCITY: 0.5 M/S
ECHO PV PEAK GRADIENT: 2 MMHG
ECHO PV VTI: 14 CM
ECHO PVEIN A DURATION: 115.3 MS
ECHO PVEIN A VELOCITY: 0.3 M/S
ECHO PVEIN PEAK D VELOCITY: 0.7 M/S
ECHO PVEIN PEAK S VELOCITY: 0.5 M/S
ECHO PVEIN S/D RATIO: 0.7
ECHO RIGHT VENTRICULAR SYSTOLIC PRESSURE (RVSP): 45 MMHG
ECHO RV INTERNAL DIMENSION: 3.3 CM
ECHO RV TAPSE: 2 CM (ref 1.7–?)
ECHO TV REGURGITANT MAX VELOCITY: 3.23 M/S
ECHO TV REGURGITANT PEAK GRADIENT: 42 MMHG
LEFT VENTRICULAR EJECTION FRACTION HIGH VALUE: 45 %
LEFT VENTRICULAR EJECTION FRACTION MODE: NORMAL
LV EF: 40 %

## 2025-03-25 PROCEDURE — 1159F MED LIST DOCD IN RCRD: CPT | Performed by: PHYSICIAN ASSISTANT

## 2025-03-25 PROCEDURE — 99213 OFFICE O/P EST LOW 20 MIN: CPT | Performed by: PHYSICIAN ASSISTANT

## 2025-03-25 PROCEDURE — 1123F ACP DISCUSS/DSCN MKR DOCD: CPT | Performed by: PHYSICIAN ASSISTANT

## 2025-03-25 PROCEDURE — 93306 TTE W/DOPPLER COMPLETE: CPT

## 2025-03-25 PROCEDURE — 1125F AMNT PAIN NOTED PAIN PRSNT: CPT | Performed by: PHYSICIAN ASSISTANT

## 2025-03-25 PROCEDURE — 93306 TTE W/DOPPLER COMPLETE: CPT | Performed by: INTERNAL MEDICINE

## 2025-03-25 ASSESSMENT — KANSAS CITY CARDIOMYOPATHY QUESTIONNAIRE (KCCQ12)
1B. OVER THE PAST 2 WEEKS, HOW MUCH WERE YOU LIMITED BY HEART FAILURE SYMPTOMS (SHORTNESS OF BREATH OR FATIGUE) WHEN WALKING 1 BLOCK ON LEVEL GROUND: LIMITED FOR OTHER REASONS OR DID NOT DO THE ACTIVITY
1C. OVER THE PAST 2 WEEKS, HOW MUCH WERE YOU LIMITED BY HEART FAILURE SYMPTOMS (SHORTNESS OF BREATH OR FATIGUE) WHEN HURRYING OR JOGGING (AS IF TO CATCH A BUS): LIMITED FOR OTHER REASONS OR DID NOT DO THE ACTIVITY
8C. OVER THE PAST 2 WEEKS, ON AVERAGE, HOW HAS HEART FAILURE LIMITED YOU ABILITY TO VISIT FAMILY AND FRIENDS OUR OF YOUR HOME: SEVERELY LIMITED
6. OVER THE PAST 2 WEEKS, HOW MUCH HAS YOUR HEART FAILURE LIMITED YOUR ENJOYMENT OF LIFE: IT HAS MODERATELY LIMITED MY ENJOYMENT OF LIFE
3. OVER THE PAST 2 WEEKS, ON AVERAGE, HOW MANY TIMES HAS FATIGUE LIMITED YOUR ABILITY TO DO WHAT YOU WANTED: SEVERAL TIMES PER DAY
8B. OVER THE PAST 2 WEEKS, ON AVERAGE, HOW HAS HEART FAILURE LIMITED YOU ABILITY TO WORK OR DO HOUSEHOLD CHORES: DOES NOT APPLY OR DID NOT DO FOR OTHER REASONS
2. OVER THE PAST 2 WEEKS, HOW MANY TIMES DID YOU HAVE SWELLING IN YOUR FEET, ANKLES OR LEGS WHEN YOU WOKE UP IN THE MORNING: EVERY MORNING
1A. OVER THE PAST 2 WEEKS, HOW MUCH WERE YOU LIMITED BY HEART FAILURE SYMPTOMS (SHORTNESS OF BREATH OR FATIGUE) WHEN SHOWERING OR BATHING: NOT AT ALL LIMITED
7. IF YOU HAD TO SPEND THE REST OF YOUR LIFE WITH YOUR HEART FAILURE THE WAY IT IS RIGHT NOW, HOW WOULD YOU FEEL ABOUT THIS?: MOSTLY DISSATISFIED
4. OVER THE PAST 2 WEEKS, ON AVERAGE, HOW MANY TIMES HAS SHORTNESS OF BREATH LIMITED YOUR ABILITY TO DO WHAT YOU WANTED: NEVER OVER THE PAST 2 WEEKS
5. OVER THE PAST 2 WEEKS, ON AVERAGE, HOW MANY TIMES HAVE YOU BEEN FORCED TO SLEEP SITTING UP IN A CHAIR OR WITH AT LEAST 3 PILLOWS TO PROP YOU UP BECAUSE OF SHORTNESS OF BREATH?: NEVER OVER THE PAST 2 WEEKS
8A. OVER THE PAST 2 WEEKS, ON AVERAGE, HOW HAS HEART FAILURE LIMITED YOU ABILITY TO DO HOBBIES OR RECREATIONAL ACTIVITIES: DOES NOT APPLY OR DID NOT DO FOR OTHER REASONS

## 2025-03-25 NOTE — PATIENT INSTRUCTIONS
Follow up on 2/24/26 for yearly echo at 9:00 am and visit with Cornelia; call sooner if concerns arise in the meantime    Follow up with Geri Santiago and Damian as scheduled    Reminder: antibiotic required prior to dental appointments

## 2025-03-27 ENCOUNTER — CARE COORDINATION (OUTPATIENT)
Dept: CARE COORDINATION | Age: 89
End: 2025-03-27

## 2025-03-27 NOTE — CARE COORDINATION
Care Transitions Note    Final Call     Patient Current Location:  Home: [address] [place] [address].  [facility] [id number]    Care Transition Nurse contacted the family, daughter Ben, (PHI form verified; on file) by telephone. Verified name and  as identifiers.    Patient graduated from the Care Transitions program on 3/27/25.  Patient/family has the ability to self-manage at this time.     Advance Care Planning:   Does patient have an Advance Directive: health care decision maker confirmed on a prior call.    Handoff:   Patient/family agreeable to ACM outreach. , Condition management for CHF, HTN.   CTN will route to Billie Piedra, Manager, for review and possible assignment to offer enrollment into ambulatory care management.         Care Summary Note:   -Ben reports her mother continues with her left sided rib pain.  Ben states she purchased and applied an abdominal binder today.  Zio monitor returned and Zoll HFMS off.  -Completed [facility] CHF clinic visit on 3/21/25 and was near euvolemic.   B/P 124/61, HR 78, oximeter 98%, weight 146 pounds.  -Completed echo and cardiology appointment (Cornelia HARTMAN) on 3/25/25.  EF 40-45%, B/P 130/80, HR 72, weight 144 pounds.  -Patient's daughter denies any needs at this time.   -CTN to sign off as care transition program ends in three days.    Assessments:  Care Transitions Subsequent and Final Call    Subsequent and Final Calls  Have your medications changed?: No  Do you have any questions related to your medications?: No  Do you currently have any active services?: Yes  Are you currently active with any services?: Home Health, Outpatient/Community Services  Do you have any needs or concerns that I can assist you with?: No  Identified Barriers: None  Care Transitions Interventions    Pharmacist: Declined       Other Services: Declined (Comment: 25-RPM.  3/27/25-accepted ACM.)   Disease Specific Clinic: Completed      Other Interventions:              Upcoming Appointments:

## 2025-03-28 ENCOUNTER — CARE COORDINATION (OUTPATIENT)
Dept: CARE COORDINATION | Age: 89
End: 2025-03-28

## 2025-03-28 ASSESSMENT — SOCIAL DETERMINANTS OF HEALTH (SDOH)
HOW OFTEN DO YOU ATTEND CHURCH OR RELIGIOUS SERVICES?: 1 TO 4 TIMES PER YEAR
HOW OFTEN DO YOU GET TOGETHER WITH FRIENDS OR RELATIVES?: MORE THAN THREE TIMES A WEEK
HOW OFTEN DO YOU ATTENT MEETINGS OF THE CLUB OR ORGANIZATION YOU BELONG TO?: NEVER
IN A TYPICAL WEEK, HOW MANY TIMES DO YOU TALK ON THE PHONE WITH FAMILY, FRIENDS, OR NEIGHBORS?: MORE THAN THREE TIMES A WEEK
DO YOU BELONG TO ANY CLUBS OR ORGANIZATIONS SUCH AS CHURCH GROUPS UNIONS, FRATERNAL OR ATHLETIC GROUPS, OR SCHOOL GROUPS?: NO

## 2025-03-28 NOTE — CARE COORDINATION
Reinforcement    COGNITIVE : FALLS-RISK OF, taught by Sandie Estrada, [place] at 3/28/2025  1:36 PM.  Learner: Family, Patient  Readiness: Acceptance  Method: Explanation  Response: Verbalizes Understanding, Needs Reinforcement    Educate bathing safety, taught by Sandie Estrada RN at 3/28/2025  1:36 PM.  Learner: Family, Patient  Readiness: Acceptance  Method: Explanation  Response: Verbalizes Understanding, Needs Reinforcement    Educate ambulation safety, taught by Sandie Estrada RN at 3/28/2025  1:36 PM.  Learner: Family, Patient  Readiness: Acceptance  Method: Explanation  Response: Verbalizes Understanding, Needs Reinforcement    Education Comments  No comments found.     ,    Goals Addressed                   This Visit's Progress     Reduce Falls    On track     I will reduce my risk of falls by the following: Remove rugs or use non slip rugs  Use walking aids like cane or walker  Follow through on orders for PT    Barriers: impairment:  physical: unsteady gait and   Plan for overcoming my barriers: Use my walker and work with therapy to get stronger  Confidence: 10/10  Anticipated Goal Completion Date: 6/28/25               PCP/Specialist follow up:   Future Appointments         Provider Specialty Dept Phone    4/4/2025 12:00 PM [facility] ROOM 4 Cardiology [phone]    5/14/2025 11:00 AM Linda Santiago MD Primary Care [phone]    6/4/2025 1:30 PM Shaylee Salgado MD Cardiology [phone]    6/27/2025 1:00 PM Linda Santiago MD Primary Care [phone]    2/24/2026 9:00 AM TONIO CHAMBERLAIN ECHO 1 Cardiology [phone]    2/24/2026 10:00 AM Cornelia Carbajal PA Cardiology [phone]            Follow Up:   Plan for next ACM outreach in approximately 1 week to complete:  - disease specific assessments  - medication review   - goal progression  - education .   Caregiver is agreeable to this plan.

## 2025-03-31 DIAGNOSIS — I50.20 HFREF (HEART FAILURE WITH REDUCED EJECTION FRACTION) (HCC): Primary | ICD-10-CM

## 2025-03-31 PROCEDURE — 99457 RPM TX MGMT 1ST 20 MIN: CPT | Performed by: NURSE PRACTITIONER

## 2025-04-01 ENCOUNTER — TELEPHONE (OUTPATIENT)
Dept: PRIMARY CARE CLINIC | Age: 89
End: 2025-04-01

## 2025-04-01 ENCOUNTER — TELEPHONE (OUTPATIENT)
Dept: CARDIOLOGY CLINIC | Age: 89
End: 2025-04-01

## 2025-04-01 DIAGNOSIS — I48.0 PAROXYSMAL ATRIAL FIBRILLATION (HCC): ICD-10-CM

## 2025-04-01 NOTE — TELEPHONE ENCOUNTER
----- Message from Dr. Shaylee Salgado MD sent at 4/1/2025  1:28 PM EDT -----  Regarding: Heart monitor  Tell her that his heart monitor showed no A fib. 2 brief episodes 92-3 seconds) of fast heart rates.   Had PVCs . Avoid caffeine

## 2025-04-01 NOTE — TELEPHONE ENCOUNTER
Carmela from [facility] requesting a call back at [phone]. She and the family are requesting orders for the oxygen to be discontinued at this time. States that with ambulation she is maintaining between 94% and 96%. Thank you.

## 2025-04-04 ENCOUNTER — HOSPITAL ENCOUNTER (OUTPATIENT)
Dept: OTHER | Age: 89
Setting detail: THERAPIES SERIES
Discharge: HOME | End: 2025-04-04
Payer: MEDICARE

## 2025-04-04 ENCOUNTER — RESULTS FOLLOW-UP (OUTPATIENT)
Age: 89
End: 2025-04-04

## 2025-04-04 VITALS
OXYGEN SATURATION: 96 % | BODY MASS INDEX: 27.5 KG/M2 | WEIGHT: 140.8 LBS | HEART RATE: 77 BPM | SYSTOLIC BLOOD PRESSURE: 158 MMHG | DIASTOLIC BLOOD PRESSURE: 72 MMHG | RESPIRATION RATE: 16 BRPM

## 2025-04-04 LAB
ANION GAP SERPL CALCULATED.3IONS-SCNC: 10 MMOL/L (ref 7–16)
BNP SERPL-MCNC: 6918 PG/ML (ref 0–450)
BUN SERPL-MCNC: 24 MG/DL (ref 6–23)
CALCIUM SERPL-MCNC: 9.2 MG/DL (ref 8.6–10.2)
CHLORIDE SERPL-SCNC: 97 MMOL/L (ref 98–107)
CO2 SERPL-SCNC: 31 MMOL/L (ref 22–29)
CREAT SERPL-MCNC: 1.2 MG/DL (ref 0.5–1)
GFR, ESTIMATED: 42 ML/MIN/1.73M2
GLUCOSE SERPL-MCNC: 113 MG/DL (ref 74–99)
POTASSIUM SERPL-SCNC: 4.1 MMOL/L (ref 3.5–5)
SODIUM SERPL-SCNC: 138 MMOL/L (ref 132–146)

## 2025-04-04 PROCEDURE — 99214 OFFICE O/P EST MOD 30 MIN: CPT

## 2025-04-04 PROCEDURE — 80048 BASIC METABOLIC PNL TOTAL CA: CPT

## 2025-04-04 PROCEDURE — 36415 COLL VENOUS BLD VENIPUNCTURE: CPT

## 2025-04-04 PROCEDURE — 83880 ASSAY OF NATRIURETIC PEPTIDE: CPT

## 2025-04-04 NOTE — RESULT ENCOUNTER NOTE
Labs and CHF clinic note reviewed  Spoke with Herminia HO     Please ask patient to start rewearing her HFMS    Stop midodrine, only use if SBP < 90    Follow up in CHF clinic in 1-2 weeks

## 2025-04-04 NOTE — PROGRESS NOTES
Congestive Heart Failure Clinic   [facility]      Referring Provider: Damian  Primary Care Physician: Linda Santiago MD   Cardiologist: Damian  Nephrologist:       HISTORY OF PRESENT ILLNESS:    Liane Booker is a 89 y.o. (4/15/1926) female with a history of HFrEF (EF < 40%)  echo 2/27/25 35-40%  Pre Cupid:     Lab Results   Component Value Date    LVEF 45 03/22/2024     Post Cupid:    Lab Results   Component Value Date    EFBP 34 (A) 02/15/2025     She presents to the CHF clinic for ongoing evaluation and monitoring of heart failure.  In the CHF clinic today she denies any adverse symptoms except:  [] Dizziness or lightheadedness   [] Syncope or near syncope  [x] Recent Fall- slid on wooden floor   [] Shortness of breath at rest   [] Dyspnea with exertion  [] Decline in functional capacity (unable to perform activities they had previously been able to do)  [] Fatigue   [] Orthopnea  [] PND  [] Nocturnal cough  [] Hemoptysis  [] Chest pain, pressure, or discomfort  [] Palpitations  [] Abdominal distention  [] Abdominal bloating  [] Early satiety  [] Blood in stool   [] Diarrhea  [] Constipation  [] Nausea/Vomiting  [] Decreased urinary response to oral diuretic   [] Scrotal swelling   [x] Lower extremity edema  [] Used PRN doses of oral diuretic   [] Weight gain    Wt Readings from Last 3 Encounters:   04/04/25 63.9 kg (140 lb 12.8 oz)   03/25/25 65.3 kg (144 lb)   03/21/25 66.3 kg (146 lb 3.2 oz)     SOCIAL HISTORY:  [x] Denies tobacco, alcohol or illicit drug abuse  [] Tobacco use:  [] ETOH use:  [] Illicit drug use:        MEDICATIONS:    Allergies   Allergen Reactions    Keflex [Cephalexin]     Reglan [Metoclopramide Hcl] Other (See Comments)     Makes me feel \"high\"     Prior to Visit Medications    Medication Sig Taking? Authorizing Provider   OXYGEN Inhale 2 L into the lungs continuous Yes Provider, MD Deborah   methocarbamol

## 2025-04-07 ENCOUNTER — TELEPHONE (OUTPATIENT)
Dept: PRIMARY CARE CLINIC | Age: 89
End: 2025-04-07

## 2025-04-07 RX ORDER — ATORVASTATIN CALCIUM 40 MG/1
40 TABLET, FILM COATED ORAL DAILY
Qty: 90 TABLET | Refills: 0 | Status: SHIPPED | OUTPATIENT
Start: 2025-04-07

## 2025-04-07 NOTE — TELEPHONE ENCOUNTER
Patient daughter called in stating they had Liane on oxygen a couple months ago but was re evaluated and no longer needs the oxygen. They will need an order from the doctor stating that it is okay for [facility] to pick you the O2 since it is no longer needed.     If you need anything else give Yoselin a call back at [phone]

## 2025-04-07 NOTE — TELEPHONE ENCOUNTER
Name of Medication(s) Requested:  Requested Prescriptions     Pending Prescriptions Disp Refills    atorvastatin (LIPITOR) 40 MG tablet [Pharmacy Med Name: Atorvastatin Calcium 40 MG Oral Tablet] 90 tablet 0     Sig: Take 1 tablet by mouth once daily       Medication is on current medication list Yes    Dosage and directions were verified? Yes    Quantity verified: 90 day supply     Pharmacy Verified?  Yes    Last Appointment:  3/18/2025    Future appts:  Future Appointments   Date Time Provider Department Center   4/17/2025  1:45 PM [facility] ROOM 4 [facility]   5/2/2025 12:00 PM [facility] ROOM 2 [facility]   5/14/2025 11:00 AM Linda Santiago MD [facility] DEP   6/4/2025  1:30 PM Shaylee Salgado MD [facility]   6/27/2025  1:00 PM Linda Santiago MD [facility] DEP   2/24/2026  9:00 AM TONIO CHAMBERLAIN ECHO 1 [facility]   2/24/2026 10:00 AM Cornelia Carbajal PA [place] [facility]        (If no appt send self scheduling link. .REFILLAPPT)  Scheduling request sent?     [] Yes  [x] No    Does patient need updated?  [] Yes  [x] No

## 2025-04-07 NOTE — TELEPHONE ENCOUNTER
Already discontinued the O2 with Carmela from [facility] and she said she would get in touch with supplier and handle it.

## 2025-04-14 PROBLEM — R82.998 URINE LEUKOCYTES: Status: ACTIVE | Noted: 2025-04-14

## 2025-04-17 ENCOUNTER — HOSPITAL ENCOUNTER (OUTPATIENT)
Dept: OTHER | Age: 89
Setting detail: THERAPIES SERIES
Discharge: HOME | End: 2025-04-17
Payer: MEDICARE

## 2025-04-17 VITALS
RESPIRATION RATE: 16 BRPM | WEIGHT: 139.8 LBS | OXYGEN SATURATION: 96 % | HEART RATE: 82 BPM | SYSTOLIC BLOOD PRESSURE: 112 MMHG | BODY MASS INDEX: 27.3 KG/M2 | DIASTOLIC BLOOD PRESSURE: 63 MMHG

## 2025-04-17 LAB
ANION GAP SERPL CALCULATED.3IONS-SCNC: 10 MMOL/L (ref 7–16)
BNP SERPL-MCNC: 3286 PG/ML (ref 0–450)
BUN SERPL-MCNC: 23 MG/DL (ref 6–23)
CALCIUM SERPL-MCNC: 9.7 MG/DL (ref 8.6–10.2)
CHLORIDE SERPL-SCNC: 95 MMOL/L (ref 98–107)
CO2 SERPL-SCNC: 30 MMOL/L (ref 22–29)
CREAT SERPL-MCNC: 1.3 MG/DL (ref 0.5–1)
GFR, ESTIMATED: 39 ML/MIN/1.73M2
GLUCOSE SERPL-MCNC: 123 MG/DL (ref 74–99)
POTASSIUM SERPL-SCNC: 3.8 MMOL/L (ref 3.5–5)
SODIUM SERPL-SCNC: 135 MMOL/L (ref 132–146)

## 2025-04-17 PROCEDURE — 99214 OFFICE O/P EST MOD 30 MIN: CPT

## 2025-04-17 PROCEDURE — 83880 ASSAY OF NATRIURETIC PEPTIDE: CPT

## 2025-04-17 PROCEDURE — 80048 BASIC METABOLIC PNL TOTAL CA: CPT

## 2025-04-17 PROCEDURE — 36415 COLL VENOUS BLD VENIPUNCTURE: CPT

## 2025-04-17 ASSESSMENT — PATIENT HEALTH QUESTIONNAIRE - PHQ9
SUM OF ALL RESPONSES TO PHQ QUESTIONS 1-9: 0
1. LITTLE INTEREST OR PLEASURE IN DOING THINGS: NOT AT ALL
2. FEELING DOWN, DEPRESSED OR HOPELESS: NOT AT ALL

## 2025-04-17 NOTE — PROGRESS NOTES
Congestive Heart Failure Clinic   [facility]      Referring Provider: Damian  Primary Care Physician: Linda Santiago MD   Cardiologist: Damian  Nephrologist:       HISTORY OF PRESENT ILLNESS:    Liane Booker is a 89 y.o. (4/15/1926) female with a history of HFrEF (EF < 40%)  echo 2/27/25 35-40%  Pre Cupid:     Lab Results   Component Value Date    LVEF 45 03/22/2024     Post Cupid:    Lab Results   Component Value Date    EFBP 34 (A) 02/15/2025     She presents to the CHF clinic for ongoing evaluation and monitoring of heart failure.  In the CHF clinic today she denies any adverse symptoms except:  [] Dizziness or lightheadedness   [] Syncope or near syncope  [] Recent Fall  [] Shortness of breath at rest   [] Dyspnea with exertion  [] Decline in functional capacity (unable to perform activities they had previously been able to do)  [] Fatigue   [] Orthopnea  [] PND  [] Nocturnal cough  [] Hemoptysis  [] Chest pain, pressure, or discomfort  [] Palpitations  [] Abdominal distention  [] Abdominal bloating  [] Early satiety  [] Blood in stool   [] Diarrhea  [] Constipation  [] Nausea/Vomiting  [] Decreased urinary response to oral diuretic   [] Scrotal swelling   [x] Lower extremity edema trace  [] Used PRN doses of oral diuretic   [] Weight gain    Wt Readings from Last 3 Encounters:   04/17/25 63.4 kg (139 lb 12.8 oz)   04/04/25 63.9 kg (140 lb 12.8 oz)   03/25/25 65.3 kg (144 lb)     SOCIAL HISTORY:  [x] Denies tobacco, alcohol or illicit drug abuse  [] Tobacco use:  [] ETOH use:  [] Illicit drug use:        MEDICATIONS:    Allergies   Allergen Reactions    Keflex [Cephalexin]     Reglan [Metoclopramide Hcl] Other (See Comments)     Makes me feel \"high\"     Prior to Visit Medications    Medication Sig Taking? Authorizing Provider   atorvastatin (LIPITOR) 40 MG tablet Take 1 tablet by mouth once daily Yes Linda Santiago MD

## 2025-04-19 DIAGNOSIS — F41.9 ANXIETY: ICD-10-CM

## 2025-04-21 RX ORDER — OMEPRAZOLE 40 MG/1
40 CAPSULE, DELAYED RELEASE ORAL DAILY
Qty: 90 CAPSULE | Refills: 1 | Status: SHIPPED | OUTPATIENT
Start: 2025-04-21

## 2025-04-21 RX ORDER — FUROSEMIDE 20 MG/1
20 TABLET ORAL DAILY
Qty: 90 TABLET | Refills: 0 | OUTPATIENT
Start: 2025-04-21

## 2025-04-21 RX ORDER — BUPROPION HYDROCHLORIDE 150 MG/1
150 TABLET ORAL DAILY
Qty: 90 TABLET | Refills: 1 | Status: SHIPPED | OUTPATIENT
Start: 2025-04-21

## 2025-04-21 RX ORDER — LEVOTHYROXINE SODIUM 125 UG/1
125 TABLET ORAL DAILY
Qty: 90 TABLET | Refills: 1 | Status: SHIPPED | OUTPATIENT
Start: 2025-04-21

## 2025-04-21 NOTE — TELEPHONE ENCOUNTER
Name of Medication(s) Requested:  Requested Prescriptions     Pending Prescriptions Disp Refills    buPROPion (WELLBUTRIN XL) 150 MG extended release tablet [Pharmacy Med Name: buPROPion HCl ER (XL) 150 MG Oral Tablet Extended Release 24 Hour] 90 tablet 1     Sig: Take 1 tablet by mouth once daily    levothyroxine (SYNTHROID) 125 MCG tablet [Pharmacy Med Name: Levothyroxine Sodium 125 MCG Oral Tablet] 90 tablet 1     Sig: Take 1 tablet by mouth once daily    omeprazole (PRILOSEC) 40 MG delayed release capsule [Pharmacy Med Name: OMEPRAZOLE DR 40MG  CAP] 90 capsule 1     Sig: Take 1 capsule by mouth once daily       Medication is on current medication list Yes    Dosage and directions were verified? Yes    Quantity verified: 90 day supply     Pharmacy Verified?  Yes    Last Appointment:  3/18/2025    Future appts:  Future Appointments   Date Time Provider Department Center   5/2/2025 12:00 PM [facility] ROOM 2 [facility]   5/14/2025 11:00 AM Linda Santiago MD CBURG [facility] DEP   6/4/2025  1:30 PM Shaylee Salgado MD [facility]   6/27/2025  1:00 PM Linda Santiago MD CBURG [facility] DEP   2/24/2026  9:00 AM SEY YNG ECHO 1 SEY[facility] CARDIO [facility] Rad/Car   2/24/2026 10:00 [facility]        (If no appt send self scheduling link. .REFILLAPPT)  Scheduling request sent?     [] Yes  [x] No    Does patient need updated?  [] Yes  [x] No

## 2025-04-23 ENCOUNTER — TELEPHONE (OUTPATIENT)
Dept: PRIMARY CARE CLINIC | Age: 89
End: 2025-04-23

## 2025-04-23 ENCOUNTER — HOSPITAL ENCOUNTER (OUTPATIENT)
Age: 89
Discharge: HOME | End: 2025-04-25
Payer: MEDICARE

## 2025-04-23 ENCOUNTER — HOSPITAL ENCOUNTER (OUTPATIENT)
Dept: GENERAL RADIOLOGY | Age: 89
Discharge: HOME | End: 2025-04-25
Payer: MEDICARE

## 2025-04-23 DIAGNOSIS — R52 PAIN: ICD-10-CM

## 2025-04-23 DIAGNOSIS — R52 PAIN: Primary | ICD-10-CM

## 2025-04-23 PROCEDURE — 71120 X-RAY EXAM BREASTBONE 2/>VWS: CPT

## 2025-04-23 PROCEDURE — 71110 X-RAY EXAM RIBS BIL 3 VIEWS: CPT

## 2025-04-23 NOTE — TELEPHONE ENCOUNTER
Patients daughter requesting an order for a xray be put into the system for Liane's sternum and left ribs. States they have been hurting her but when her  lifted her to put her into the car yesterday she felt something pop, Thank you Pleas call Star back at [phone]

## 2025-04-24 ENCOUNTER — RESULTS FOLLOW-UP (OUTPATIENT)
Dept: INTERNAL MEDICINE CLINIC | Age: 89
End: 2025-04-24

## 2025-04-24 RX ORDER — GABAPENTIN 300 MG/1
300 CAPSULE ORAL 2 TIMES DAILY
Qty: 180 CAPSULE | Refills: 0 | Status: SHIPPED | OUTPATIENT
Start: 2025-04-24 | End: 2025-07-23

## 2025-04-24 RX ORDER — TRAZODONE HYDROCHLORIDE 50 MG/1
25 TABLET ORAL NIGHTLY
Qty: 90 TABLET | Refills: 0 | Status: SHIPPED | OUTPATIENT
Start: 2025-04-24

## 2025-05-02 ENCOUNTER — HOSPITAL ENCOUNTER (OUTPATIENT)
Dept: OTHER | Age: 89
Setting detail: THERAPIES SERIES
Discharge: HOME | End: 2025-05-02
Payer: MEDICARE

## 2025-05-02 VITALS
DIASTOLIC BLOOD PRESSURE: 59 MMHG | WEIGHT: 138.2 LBS | RESPIRATION RATE: 18 BRPM | HEART RATE: 68 BPM | OXYGEN SATURATION: 96 % | SYSTOLIC BLOOD PRESSURE: 134 MMHG | BODY MASS INDEX: 26.99 KG/M2

## 2025-05-02 LAB
ANION GAP SERPL CALCULATED.3IONS-SCNC: 12 MMOL/L (ref 7–16)
BNP SERPL-MCNC: 3110 PG/ML (ref 0–450)
BUN SERPL-MCNC: 31 MG/DL (ref 6–23)
CALCIUM SERPL-MCNC: 9.8 MG/DL (ref 8.6–10.2)
CHLORIDE SERPL-SCNC: 96 MMOL/L (ref 98–107)
CO2 SERPL-SCNC: 27 MMOL/L (ref 22–29)
CREAT SERPL-MCNC: 1.3 MG/DL (ref 0.5–1)
GFR, ESTIMATED: 39 ML/MIN/1.73M2
GLUCOSE SERPL-MCNC: 115 MG/DL (ref 74–99)
POTASSIUM SERPL-SCNC: 4.9 MMOL/L (ref 3.5–5)
SODIUM SERPL-SCNC: 135 MMOL/L (ref 132–146)

## 2025-05-02 PROCEDURE — 80048 BASIC METABOLIC PNL TOTAL CA: CPT

## 2025-05-02 PROCEDURE — 83880 ASSAY OF NATRIURETIC PEPTIDE: CPT

## 2025-05-02 PROCEDURE — 99214 OFFICE O/P EST MOD 30 MIN: CPT

## 2025-05-02 PROCEDURE — 36415 COLL VENOUS BLD VENIPUNCTURE: CPT

## 2025-05-02 NOTE — PROGRESS NOTES
currently prescribed medications with patient, educated on importance of compliance and answered any questions regarding their medication  [] Pill box provided to patient  [] Patient using pill packing pharmacy   [] CPAP/BiPAP use  [] Low impact exercise / cardiac rehab   [] LifeVest use  [x] Patient aware of signs and symptoms of worsening HF, CHF clinic phone number provided and made aware to call clinic for sooner if evaluation if needed     [] Difficulty affording medications  [] CHF CHW consulted  [] Prescription assistance information given   [] [facility] medication assistance program information given   [] Sample medications provided to patient to help bridge gap until affordability N/A          Scheduled to follow up in CHF clinic on:   Future Appointments   Date Time Provider Department Center   5/14/2025 11:00 AM Linda Santiago MD [facility]UR[facility] DEP   6/4/2025  1:30 PM Shaylee Salgado MD [facility]   6/13/2025 12:45 PM [facility] CHF ROOM 1 [facility]   6/27/2025  1:00 PM Linda Santiago MD [facility] DEP   2/24/2026  9:00 AM SEY YNG ECHO 1 SEYZ [facility] Rad/Car   2/24/2026 10:00 AM Cornelia Carbajal PA [facility]

## 2025-05-05 ENCOUNTER — TELEPHONE (OUTPATIENT)
Dept: PRIMARY CARE CLINIC | Age: 89
End: 2025-05-05

## 2025-05-05 DIAGNOSIS — S22.42XG CLOSED FRACTURE OF MULTIPLE RIBS OF LEFT SIDE WITH DELAYED HEALING, SUBSEQUENT ENCOUNTER: Primary | ICD-10-CM

## 2025-05-05 RX ORDER — HYDROCODONE BITARTRATE AND ACETAMINOPHEN 5; 325 MG/1; MG/1
1 TABLET ORAL EVERY 4 HOURS PRN
Qty: 18 TABLET | Refills: 0 | Status: SHIPPED | OUTPATIENT
Start: 2025-05-05 | End: 2025-05-08

## 2025-05-05 NOTE — TELEPHONE ENCOUNTER
Patient daughter called in stating Liane has fracture and broken ribs on both sides and she is in extreme pain. She wants to know if Dr. Santiago can prescribe pain medication? If Dr. Santiago can not prescribe it, she would like to know if a referral to pain management can be put in?    [facility] in [place].

## 2025-05-07 ENCOUNTER — CARE COORDINATION (OUTPATIENT)
Dept: CARE COORDINATION | Age: 89
End: 2025-05-07

## 2025-05-07 NOTE — CARE COORDINATION
Ambulatory Care Coordination Note     2025 3:24 PM     Patient Current Location:  Home: [address] [place] [address].  [facility] [id number]     ACM contacted the patient by telephone. Verified name and  with patient as identifiers.         ACM: Sandie Estrada RN     Challenges to be reviewed by the provider   Additional needs identified to be addressed with provider No  none               Method of communication with provider: phone.    Utilization: Patient has not had any utilization since our last call.     Care Summary Note: Liane has follow up appointments scheduled with her PCP and cardiology. She has family support and all of her medications. Denies dizziness, headache, dyspnea. Will continue to follow for cc. Will prepare for graduation with next outreaches.     Offered patient enrollment in the Remote Patient Monitoring (RPM) program for in-home monitoring: Deferred at this time because unwilling to participate; will discuss at next outreach.     Assessments Completed:       2025     3:22 PM   Amb Fall Risk Assessment and TUG Test   Do you feel unsteady or are you worried about falling?  yes   2 or more falls in past year? no   Fall with injury in past year? no        Medications Reviewed:   Patient denies any changes with medications and reports taking all medications as prescribed.    Advance Care Planning:   Not reviewed during this call     Care Planning:   Education Documentation  General medication information, taught by Sandie Estrada RN at 2025  3:23 PM.  Learner: Patient  Readiness: Acceptance  Method: Explanation  Response: Verbalizes Understanding, Needs Reinforcement    diet considerations - anticoagulation therapy, taught by Sandie Estrada RN at 2025  3:23 PM.  Learner: Patient  Readiness: Acceptance  Method: Explanation  Response: Verbalizes Understanding, Needs Reinforcement    complete medication list, taught by Sandie Estrada RN at 2025  3:23 PM.  Learner: 
Principal Discharge DX:	Term birth of female   Goal:	Routine Care  Assessment and plan of treatment:	Please follow up with your pediatrician within 1-2 days of discharge from the hospital

## 2025-05-16 DIAGNOSIS — I50.22 CHRONIC HFREF (HEART FAILURE WITH REDUCED EJECTION FRACTION) (HCC): ICD-10-CM

## 2025-05-16 DIAGNOSIS — I25.10 CORONARY ARTERY DISEASE INVOLVING NATIVE CORONARY ARTERY OF NATIVE HEART WITHOUT ANGINA PECTORIS: ICD-10-CM

## 2025-05-18 RX ORDER — TRAZODONE HYDROCHLORIDE 50 MG/1
25 TABLET ORAL NIGHTLY
Qty: 45 TABLET | Refills: 0 | Status: SHIPPED | OUTPATIENT
Start: 2025-05-18

## 2025-05-18 RX ORDER — METOPROLOL SUCCINATE 25 MG/1
12.5 TABLET, EXTENDED RELEASE ORAL DAILY
Qty: 45 TABLET | Refills: 0 | Status: SHIPPED | OUTPATIENT
Start: 2025-05-18

## 2025-05-18 RX ORDER — TORSEMIDE 20 MG/1
20 TABLET ORAL DAILY
Qty: 90 TABLET | Refills: 0 | Status: SHIPPED | OUTPATIENT
Start: 2025-05-18

## 2025-05-19 DIAGNOSIS — F41.9 ANXIETY: ICD-10-CM

## 2025-05-19 DIAGNOSIS — I10 HYPERTENSION, UNSPECIFIED TYPE: ICD-10-CM

## 2025-05-19 RX ORDER — LOSARTAN POTASSIUM 25 MG/1
12.5 TABLET ORAL DAILY
Qty: 45 TABLET | Refills: 0 | Status: SHIPPED | OUTPATIENT
Start: 2025-05-19

## 2025-05-19 RX ORDER — GABAPENTIN 300 MG/1
300 CAPSULE ORAL 2 TIMES DAILY
Qty: 180 CAPSULE | Refills: 0 | Status: SHIPPED | OUTPATIENT
Start: 2025-05-19 | End: 2025-08-17

## 2025-05-19 RX ORDER — OMEPRAZOLE 40 MG/1
40 CAPSULE, DELAYED RELEASE ORAL DAILY
Qty: 90 CAPSULE | Refills: 0 | Status: SHIPPED | OUTPATIENT
Start: 2025-05-19

## 2025-05-19 RX ORDER — CLOPIDOGREL BISULFATE 75 MG/1
75 TABLET ORAL DAILY
Qty: 90 TABLET | Refills: 0 | Status: SHIPPED | OUTPATIENT
Start: 2025-05-19

## 2025-05-19 RX ORDER — LEVOTHYROXINE SODIUM 125 UG/1
125 TABLET ORAL DAILY
Qty: 90 TABLET | Refills: 0 | Status: SHIPPED | OUTPATIENT
Start: 2025-05-19

## 2025-05-19 RX ORDER — ATORVASTATIN CALCIUM 40 MG/1
40 TABLET, FILM COATED ORAL DAILY
Qty: 90 TABLET | Refills: 0 | Status: SHIPPED | OUTPATIENT
Start: 2025-05-19

## 2025-05-19 RX ORDER — BUPROPION HYDROCHLORIDE 150 MG/1
150 TABLET ORAL DAILY
Qty: 90 TABLET | Refills: 1 | Status: SHIPPED | OUTPATIENT
Start: 2025-05-19

## 2025-05-19 NOTE — TELEPHONE ENCOUNTER
Name of Medication(s) Requested:  Requested Prescriptions     Pending Prescriptions Disp Refills    omeprazole (PRILOSEC) 40 MG delayed release capsule 90 capsule 1     Sig: Take 1 capsule by mouth daily    losartan (COZAAR) 25 MG tablet 45 tablet 0    levothyroxine (SYNTHROID) 125 MCG tablet 90 tablet 1     Sig: Take 1 tablet by mouth daily    gabapentin (NEURONTIN) 300 MG capsule 180 capsule 0     Sig: Take 1 capsule by mouth in the morning and at bedtime for 90 days.    clopidogrel (PLAVIX) 75 MG tablet 90 tablet 0     Sig: Take 1 tablet by mouth daily    atorvastatin (LIPITOR) 40 MG tablet 90 tablet 0     Sig: Take 1 tablet by mouth daily    buPROPion (WELLBUTRIN XL) 150 MG extended release tablet 90 tablet 1     Sig: Take 1 tablet by mouth daily       Medication is on current medication list Yes    Dosage and directions were verified? Yes    Quantity verified: 90 day supply     Pharmacy Verified?  Yes    Last Appointment:  3/18/2025    Future appts:  Future Appointments   Date Time Provider Department Center   6/4/2025  1:30 PM Shaylee Salgado MD [facility]   6/13/2025 12:45 PM [facility] ROOM 1 [facility]   6/27/2025  1:00 PM Linda Santiago MD CBUR[facility] DEP   9/2/2025  1:00 PM Mary Dawn DO [facility]UR[facility] DEP   2/24/2026  9:00 AM TONIO CHAMBERLAIN ECHO 1 SEYZ CARDIO [facility] Rad/Car   2/24/2026 10:00 AM [facility]        (If no appt send self scheduling link. .REFILLAPPT)  Scheduling request sent?     [] Yes  [x] No    Does patient need updated?  [] Yes  [x] No

## 2025-05-19 NOTE — TELEPHONE ENCOUNTER
Patient daughter called in. Patient will need refills to get her to her next appointment due to rescheduling.

## 2025-06-05 ENCOUNTER — HOSPITAL ENCOUNTER (EMERGENCY)
Age: 89
Discharge: HOME | End: 2025-06-05
Attending: EMERGENCY MEDICINE
Payer: MEDICARE

## 2025-06-05 ENCOUNTER — APPOINTMENT (OUTPATIENT)
Dept: CT IMAGING | Age: 89
End: 2025-06-05
Payer: MEDICARE

## 2025-06-05 ENCOUNTER — APPOINTMENT (OUTPATIENT)
Dept: GENERAL RADIOLOGY | Age: 89
End: 2025-06-05
Payer: MEDICARE

## 2025-06-05 VITALS
HEART RATE: 77 BPM | WEIGHT: 137 LBS | BODY MASS INDEX: 26.76 KG/M2 | RESPIRATION RATE: 22 BRPM | DIASTOLIC BLOOD PRESSURE: 56 MMHG | SYSTOLIC BLOOD PRESSURE: 105 MMHG | TEMPERATURE: 98.3 F | OXYGEN SATURATION: 94 %

## 2025-06-05 DIAGNOSIS — S22.060A CLOSED WEDGE COMPRESSION FRACTURE OF T8 VERTEBRA, INITIAL ENCOUNTER (HCC): ICD-10-CM

## 2025-06-05 DIAGNOSIS — K44.9 HIATAL HERNIA: ICD-10-CM

## 2025-06-05 DIAGNOSIS — K59.00 CONSTIPATION, UNSPECIFIED CONSTIPATION TYPE: ICD-10-CM

## 2025-06-05 DIAGNOSIS — S09.90XA CLOSED HEAD INJURY, INITIAL ENCOUNTER: Primary | ICD-10-CM

## 2025-06-05 DIAGNOSIS — D35.02 ADRENAL ADENOMA, LEFT: ICD-10-CM
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
## 2025-06-05 DIAGNOSIS — M25.552 LEFT HIP PAIN: ICD-10-CM

## 2025-06-05 LAB
EKG ATRIAL RATE: 72 BPM
EKG P AXIS: 49 DEGREES
EKG P-R INTERVAL: 142 MS
EKG Q-T INTERVAL: 452 MS
EKG QRS DURATION: 136 MS
EKG QTC CALCULATION (BAZETT): 494 MS
EKG R AXIS: 52 DEGREES
EKG T AXIS: 102 DEGREES
EKG VENTRICULAR RATE: 72 BPM

## 2025-06-05 PROCEDURE — 73562 X-RAY EXAM OF KNEE 3: CPT

## 2025-06-05 PROCEDURE — 73502 X-RAY EXAM HIP UNI 2-3 VIEWS: CPT

## 2025-06-05 PROCEDURE — 72125 CT NECK SPINE W/O DYE: CPT

## 2025-06-05 PROCEDURE — 70450 CT HEAD/BRAIN W/O DYE: CPT

## 2025-06-05 PROCEDURE — 99284 EMERGENCY DEPT VISIT MOD MDM: CPT

## 2025-06-05 PROCEDURE — 71250 CT THORAX DX C-: CPT

## 2025-06-05 PROCEDURE — 74176 CT ABD & PELVIS W/O CONTRAST: CPT

## 2025-06-05 PROCEDURE — 71045 X-RAY EXAM CHEST 1 VIEW: CPT

## 2025-06-05 PROCEDURE — 93005 ELECTROCARDIOGRAM TRACING: CPT | Performed by: EMERGENCY MEDICINE

## 2025-06-05 PROCEDURE — 93010 ELECTROCARDIOGRAM REPORT: CPT | Performed by: INTERNAL MEDICINE

## 2025-06-05 ASSESSMENT — PAIN SCALES - GENERAL: PAINLEVEL_OUTOF10: 8

## 2025-06-05 ASSESSMENT — PAIN - FUNCTIONAL ASSESSMENT
PAIN_FUNCTIONAL_ASSESSMENT: NONE - DENIES PAIN
PAIN_FUNCTIONAL_ASSESSMENT: 0-10

## 2025-06-05 ASSESSMENT — PAIN DESCRIPTION - ORIENTATION: ORIENTATION: LEFT

## 2025-06-05 ASSESSMENT — PAIN DESCRIPTION - LOCATION: LOCATION: HIP

## 2025-06-05 ASSESSMENT — PAIN DESCRIPTION - DESCRIPTORS: DESCRIPTORS: ACHING;DISCOMFORT;DULL

## 2025-06-05 NOTE — ED NOTES
PT hit head, + blood thinners, no LOC. Denies pain upon palpation. A&Ox4, no acute distress.  Speech slurred a little bit, but can repeat when I say.  Memory intact. Daughter in room, says speech is baseline.

## 2025-06-05 NOTE — ED PROVIDER NOTES
[facility] EMERGENCY DEPARTMENT  EMERGENCY DEPARTMENT ENCOUNTER        Pt Name: Liane Booker  MRN: [id number]  Birthdate 4/15/1926  Date of evaluation: 6/5/2025  Provider: Carmela Patel DO  PCP: Linda Santiago MD  Note Started: 1:45 PM EDT 6/5/25    CHIEF COMPLAINT       Chief Complaint   Patient presents with    Fall     Fell last night, +thinners, -loc, left hip pain       HISTORY OF PRESENT ILLNESS: 1 or more Elements   History From: patient and daughter    Limitations to history : None    Liane Booker is a 89 y.o. female who presents with left hip and knee pain and closed head injury after mechanical fall beginning yesterday.  Daughter reports that the patient slipped on the slippery floor and fell onto her buttocks first and then rolled back and hit the back of her head.  Patient states she was trying to sit down in the chair when it occurred.  She is able to ambulate but has pain expressed in the left hip and knee with ambulation.  She denies loss of consciousness, neck or back pain.  She is on blood thinners.  Did have a hematoma on the back of her head but that improved/resolved with ice application the complaint has been persistent, moderate in severity.  Additionally, while here, patient's daughter advises that she has a history of constipation, and is on stool softeners every day, but has not had a bowel movement in 8 days.  Daughter advises she has given her enemas without improvement.  They denies fever/chills, sore throat, cough, congestion, chest pain, shortness of breath, edema, headache, visual disturbances, focal paresthesias, focal weakness, abdominal pain, nausea, vomiting, diarrhea, dysuria, hematuria, neck or back pain, rash or other complaints.        Nursing Notes were all reviewed and agreed with or any disagreements were addressed in the HPI.    REVIEW OF SYSTEMS :           Positives and Pertinent negatives as per HPI.     SURGICAL HISTORY     Past        DISCONTINUED MEDICATIONS:  Discharge Medication List as of 6/5/2025  4:17 PM                 (Please note that portions of this note were completed with a voice recognition program.  Efforts were made to edit the dictations but occasionally words are mis-transcribed.)    Carmela Patel DO (electronically signed)            Carmela Patel DO  06/06/25 0706

## 2025-06-05 NOTE — DISCHARGE INSTRUCTIONS
CT CHEST WO CONTRAST   Final Result   1. T8 compression fracture slightly worsened compared to 03/07/2025.  No new   or acute compression fractures are visible.   2. No acute rib fractures detected.   3. Moderate to large hiatal hernia, unchanged.   4. Resolution of pleural effusions.   5. Stable mild right adrenal hypertrophy and benign 1.5 cm adrenal adenoma on   the left.         CT ABDOMEN PELVIS WO CONTRAST Additional Contrast? None   Final Result   1. Large hiatal hernia.   2. No evidence of an acute intra-abdominal or pelvic process or acute   fracture.   3. Multifocal increased colonic fecal volume.   4. Sigmoid diverticulosis without diverticulitis.         CT HEAD WO CONTRAST   Final Result   1. No acute intracranial abnormality.   2. Generalized atrophy with chronic small vessel ischemic change.         CT CERVICAL SPINE WO CONTRAST   Final Result   1. No acute fracture or traumatic malalignment of the cervical spine.   2. Multilevel degenerative changes seen within the cervical spine most   pronounced at the C4/C5, C5/C6 and C6/C7 levels.         XR HIP 2-3 VW W PELVIS LEFT   Final Result   No acute abnormality of the pelvis and left hip.         XR CHEST PORTABLE   Final Result   No acute process.      Suspect mild cardiomegaly.         XR KNEE LEFT (3 VIEWS)   Final Result   Total knee prosthesis in anatomic alignment.

## 2025-06-13 ENCOUNTER — HOSPITAL ENCOUNTER (OUTPATIENT)
Dept: OTHER | Age: 89
Setting detail: THERAPIES SERIES
Discharge: HOME | End: 2025-06-13
Payer: MEDICARE

## 2025-06-13 VITALS
SYSTOLIC BLOOD PRESSURE: 113 MMHG | OXYGEN SATURATION: 95 % | DIASTOLIC BLOOD PRESSURE: 54 MMHG | BODY MASS INDEX: 27.19 KG/M2 | WEIGHT: 139.2 LBS | HEART RATE: 62 BPM | RESPIRATION RATE: 18 BRPM

## 2025-06-13 LAB
ANION GAP SERPL CALCULATED.3IONS-SCNC: 13 MMOL/L (ref 7–16)
BNP SERPL-MCNC: 1808 PG/ML (ref 0–450)
BUN SERPL-MCNC: 22 MG/DL (ref 6–23)
CALCIUM SERPL-MCNC: 9.1 MG/DL (ref 8.6–10.2)
CHLORIDE SERPL-SCNC: 95 MMOL/L (ref 98–107)
CO2 SERPL-SCNC: 27 MMOL/L (ref 22–29)
CREAT SERPL-MCNC: 1.3 MG/DL (ref 0.5–1)
GFR, ESTIMATED: 36 ML/MIN/1.73M2
GLUCOSE SERPL-MCNC: 117 MG/DL (ref 74–99)
POTASSIUM SERPL-SCNC: 4.4 MMOL/L (ref 3.5–5)
SODIUM SERPL-SCNC: 135 MMOL/L (ref 132–146)

## 2025-06-13 PROCEDURE — 99214 OFFICE O/P EST MOD 30 MIN: CPT

## 2025-06-13 PROCEDURE — 80048 BASIC METABOLIC PNL TOTAL CA: CPT

## 2025-06-13 PROCEDURE — 36415 COLL VENOUS BLD VENIPUNCTURE: CPT

## 2025-06-13 PROCEDURE — 83880 ASSAY OF NATRIURETIC PEPTIDE: CPT

## 2025-06-13 NOTE — PROGRESS NOTES
torsemide (DEMADEX) 20 MG tablet Take 1 tablet by mouth daily Yes Linda Santiago MD   omeprazole (PRILOSEC) 40 MG delayed release capsule Take 1 capsule by mouth daily  Linda Santiago MD   levothyroxine (SYNTHROID) 125 MCG tablet Take 1 tablet by mouth daily  Linda Santiago MD   gabapentin (NEURONTIN) 300 MG capsule Take 1 capsule by mouth in the morning and at bedtime for 90 days.  Linda Santiago MD   clopidogrel (PLAVIX) 75 MG tablet Take 1 tablet by mouth daily  Linda Santiago MD   atorvastatin (LIPITOR) 40 MG tablet Take 1 tablet by mouth daily  Linda Santiago MD   buPROPion (WELLBUTRIN XL) 150 MG extended release tablet Take 1 tablet by mouth daily  Linda Santiago MD   traZODone (DESYREL) 50 MG tablet Take 0.5 tablets by mouth nightly  Linda Santiago MD   OXYGEN Inhale 2 L into the lungs continuous  Patient not taking: Reported on 6/5/2025  Deborah Whiting MD   methocarbamol (ROBAXIN) 750 MG tablet TAKE 1 TABLET BY MOUTH THREE TIMES DAILY FOR MUSCLE SPASM  Patient taking differently: Takes nightly  Deborah Whiting MD   Torsemide 40 MG TABS Take 20 mg by mouth daily  Erica Chand, APRN - CNP   aspirin 81 MG EC tablet Take 1 tablet by mouth daily  Patient not taking: Reported on 6/5/2025  Deborah Whiting MD   diclofenac sodium (VOLTAREN) 1 % GEL Apply 2 g topically 2 times daily  Linda Santiago MD   docusate sodium (COLACE) 100 MG capsule Take 1 capsule by mouth 2 times daily  Deborah Whiting MD   loratadine (CLARITIN) 10 MG tablet Take 1 tablet by mouth daily  Deborah Whiting MD   acetaminophen (TYLENOL) 500 MG tablet Take 2 tablets by mouth nightly as needed for Pain  Patient taking differently: Take 2 tablets by mouth every 4 hours as needed for Pain  Deborah Whiting MD   Multiple Vitamin (MULTI-VITAMIN DAILY) TABS Take 1 tablet by mouth daily  Deborah Whiting MD   Biotin 1000 MCG TABS Take 1 tablet by mouth daily

## 2025-06-16 ENCOUNTER — OFFICE VISIT (OUTPATIENT)
Dept: PRIMARY CARE CLINIC | Age: 89
End: 2025-06-16
Payer: MEDICARE

## 2025-06-16 VITALS
DIASTOLIC BLOOD PRESSURE: 58 MMHG | HEART RATE: 73 BPM | SYSTOLIC BLOOD PRESSURE: 117 MMHG | HEIGHT: 60 IN | BODY MASS INDEX: 27.09 KG/M2 | TEMPERATURE: 98.1 F | WEIGHT: 138 LBS

## 2025-06-16 DIAGNOSIS — N39.0 RECURRENT UTI: Primary | ICD-10-CM

## 2025-06-16 LAB
BILIRUBIN, POC: NEGATIVE
BLOOD URINE, POC: NEGATIVE
CLARITY, POC: ABNORMAL
COLOR, POC: YELLOW
GLUCOSE URINE, POC: NEGATIVE MG/DL
KETONES, POC: NEGATIVE MG/DL
LEUKOCYTE EST, POC: ABNORMAL
NITRITE, POC: POSITIVE
PH, POC: 6
PROTEIN, POC: NEGATIVE MG/DL
SPECIFIC GRAVITY, POC: 1.01
UROBILINOGEN, POC: 0.2 MG/DL

## 2025-06-16 PROCEDURE — 99213 OFFICE O/P EST LOW 20 MIN: CPT | Performed by: NURSE PRACTITIONER

## 2025-06-16 PROCEDURE — 1159F MED LIST DOCD IN RCRD: CPT | Performed by: NURSE PRACTITIONER

## 2025-06-16 PROCEDURE — 1123F ACP DISCUSS/DSCN MKR DOCD: CPT | Performed by: NURSE PRACTITIONER

## 2025-06-16 PROCEDURE — 81002 URINALYSIS NONAUTO W/O SCOPE: CPT | Performed by: NURSE PRACTITIONER

## 2025-06-16 RX ORDER — CIPROFLOXACIN 500 MG/1
500 TABLET, FILM COATED ORAL 2 TIMES DAILY
Qty: 20 TABLET | Refills: 0 | Status: SHIPPED | OUTPATIENT
Start: 2025-06-16 | End: 2025-06-18

## 2025-06-16 RX ORDER — OXYCODONE HYDROCHLORIDE 5 MG/1
TABLET ORAL
COMMUNITY
Start: 2025-05-27

## 2025-06-16 NOTE — PROGRESS NOTES
Chief Complaint:   Incontinence (Increased incontinence, urine has foul odor.)      History of Present Illness   Source of history provided by:  patient/daughter      Liane Booker is a 89 y.o. old female who has a past medical history of:   Past Medical History:   Diagnosis Date    Anxiety     Depression     DJD (degenerative joint disease)     Dysphagia 02/20/2014    GERD (gastroesophageal reflux disease) 02/20/2014    Hyperlipidemia     Hypertension     Hypothyroidism     Irritable bowel syndrome with constipation     Low vitamin D level     Osteoarthritis     Valvular heart disease         [facility] incontinence and malodorous urine.  Pt states the symptoms have progressed over the past few days.    No flank pain.  Denies any vaginal discharge, vaginal bleeding, vomiting, diarrhea, or lethargy.   No LMP recorded. Patient has had a hysterectomy.  Pt is afebrile and tolerating PO well. Pt has a h/o UTIs      ROS    Unless otherwise stated in this report or unable to obtain because of the patient's clinical or mental status as evidenced by the medical record, this patients's positive and negative responses for Review of Systems, constitutional, psych, eyes, ENT, cardiovascular, respiratory, gastrointestinal, neurological, genitourinary, musculoskeletal, integument systems and systems related to the presenting problem are either stated in the preceding or were not pertinent or were negative for the symptoms and/or complaints related to the medical problem.    Past Surgical history:   Past Surgical History:   Procedure Laterality Date    APPENDECTOMY      CARDIAC PROCEDURE N/A 2/26/2025    TRANSCATHETER AORTIC VALVE REPLACEMENT FEMORAL APPROACH performed by Michael Tiwari MD at [facility] OR    CARDIAC PROCEDURE N/A 2/26/2025    TRANSCATHETER AORTIC VALVE REPLACEMENT FEMORAL APPROACH performed by Jim Dennison DO at [facility] OR    CARPAL TUNNEL RELEASE Right 10/05/2016    RIGHT INDEX

## 2025-06-18 ENCOUNTER — RESULTS FOLLOW-UP (OUTPATIENT)
Dept: PRIMARY CARE CLINIC | Age: 89
End: 2025-06-18

## 2025-06-18 LAB
CULTURE: ABNORMAL
SPECIMEN DESCRIPTION: ABNORMAL

## 2025-06-18 RX ORDER — SULFAMETHOXAZOLE AND TRIMETHOPRIM 400; 80 MG/1; MG/1
1 TABLET ORAL 2 TIMES DAILY
Qty: 20 TABLET | Refills: 0 | Status: SHIPPED | OUTPATIENT
Start: 2025-06-18 | End: 2025-06-28

## 2025-06-23 NOTE — PROGRESS NOTES
This patient has previously agreed to monthly remote monitoring with HFMS (heart failure management system) device for heart failure management purposes.     HFMS monitoring for the month of :     The monitored patient Thoracic Fluid Index (TFI) is:    [] Within normal limits  [x] Above threshold  [] Below threshold      The following adjustments have been made:    [x] Patient called and TFI alert reviewed  [] Patient offered/scheduled urgent CHF clinic appointment   [x] Diuretic uptitration  [] Diuretic de-escalation  [] Goal Direct Medical Therapy (GDMT) adjusted  [] No changes required      Remote physiologic monitoring (Heart Failure Management System, HFMS) and treatment management services, initial 20 minutes or more of clinical staff/physician/other qualified health care professional time in a calendar month requiring interactive communication with the patient/caregiver during the month.      Please see uploaded media device report      Reviewed by:  ZANE Ann      Electronically signed by Stuart Johnson RN on 6/23/2025 at 8:59 AM

## 2025-08-05 ENCOUNTER — OFFICE VISIT (OUTPATIENT)
Dept: CARDIOLOGY CLINIC | Age: 89
End: 2025-08-05
Payer: MEDICARE

## 2025-08-05 VITALS
RESPIRATION RATE: 18 BRPM | DIASTOLIC BLOOD PRESSURE: 60 MMHG | HEIGHT: 60 IN | SYSTOLIC BLOOD PRESSURE: 128 MMHG | BODY MASS INDEX: 26.5 KG/M2 | HEART RATE: 69 BPM | WEIGHT: 135 LBS

## 2025-08-05 DIAGNOSIS — I35.0 NONRHEUMATIC AORTIC VALVE STENOSIS: ICD-10-CM

## 2025-08-05 DIAGNOSIS — Z95.2 HISTORY OF TRANSCATHETER AORTIC VALVE REPLACEMENT (TAVR): ICD-10-CM

## 2025-08-05 DIAGNOSIS — N18.9 CHRONIC KIDNEY DISEASE, UNSPECIFIED CKD STAGE: ICD-10-CM

## 2025-08-05 DIAGNOSIS — I50.20 HFREF (HEART FAILURE WITH REDUCED EJECTION FRACTION) (HCC): Primary | ICD-10-CM

## 2025-08-05 PROCEDURE — 99214 OFFICE O/P EST MOD 30 MIN: CPT | Performed by: INTERNAL MEDICINE

## 2025-08-05 PROCEDURE — 1159F MED LIST DOCD IN RCRD: CPT | Performed by: INTERNAL MEDICINE

## 2025-08-05 PROCEDURE — 1123F ACP DISCUSS/DSCN MKR DOCD: CPT | Performed by: INTERNAL MEDICINE

## 2025-08-05 PROCEDURE — 93000 ELECTROCARDIOGRAM COMPLETE: CPT | Performed by: INTERNAL MEDICINE

## 2025-08-05 PROCEDURE — 1160F RVW MEDS BY RX/DR IN RCRD: CPT | Performed by: INTERNAL MEDICINE

## 2025-08-29 ENCOUNTER — HOSPITAL ENCOUNTER (OUTPATIENT)
Dept: OTHER | Age: 89
Setting detail: THERAPIES SERIES
Discharge: HOME | End: 2025-08-29
Payer: MEDICARE

## 2025-08-29 VITALS
BODY MASS INDEX: 27.26 KG/M2 | WEIGHT: 139.6 LBS | HEART RATE: 70 BPM | SYSTOLIC BLOOD PRESSURE: 162 MMHG | RESPIRATION RATE: 16 BRPM | OXYGEN SATURATION: 96 % | DIASTOLIC BLOOD PRESSURE: 62 MMHG

## 2025-08-29 LAB
ANION GAP SERPL CALCULATED.3IONS-SCNC: 14 MMOL/L (ref 7–16)
BNP SERPL-MCNC: 1268 PG/ML (ref 0–450)
BUN SERPL-MCNC: 24 MG/DL (ref 8–23)
CALCIUM SERPL-MCNC: 9.1 MG/DL (ref 8.8–10.2)
CHLORIDE SERPL-SCNC: 96 MMOL/L (ref 98–107)
CO2 SERPL-SCNC: 26 MMOL/L (ref 22–29)
CREAT SERPL-MCNC: 1.2 MG/DL (ref 0.5–1)
GFR, ESTIMATED: 39 ML/MIN/1.73M2
GLUCOSE SERPL-MCNC: 131 MG/DL (ref 74–99)
POTASSIUM SERPL-SCNC: 4.1 MMOL/L (ref 3.5–5.1)
SODIUM SERPL-SCNC: 136 MMOL/L (ref 136–145)

## 2025-08-29 PROCEDURE — 83880 ASSAY OF NATRIURETIC PEPTIDE: CPT

## 2025-08-29 PROCEDURE — 36415 COLL VENOUS BLD VENIPUNCTURE: CPT

## 2025-08-29 PROCEDURE — 80048 BASIC METABOLIC PNL TOTAL CA: CPT

## 2025-08-29 PROCEDURE — 99214 OFFICE O/P EST MOD 30 MIN: CPT

## 2025-08-29 ASSESSMENT — PATIENT HEALTH QUESTIONNAIRE - PHQ9
SUM OF ALL RESPONSES TO PHQ QUESTIONS 1-9: 0
SUM OF ALL RESPONSES TO PHQ QUESTIONS 1-9: 0
1. LITTLE INTEREST OR PLEASURE IN DOING THINGS: NOT AT ALL
SUM OF ALL RESPONSES TO PHQ QUESTIONS 1-9: 0
2. FEELING DOWN, DEPRESSED OR HOPELESS: NOT AT ALL
SUM OF ALL RESPONSES TO PHQ QUESTIONS 1-9: 0

## (undated) DEVICE — GUIDEWIRE VASC L260CM DIA0.035IN TAPR L11CM FLPY TIP L4CM

## (undated) DEVICE — DOUBLE BASIN SET: Brand: MEDLINE INDUSTRIES, INC.

## (undated) DEVICE — TOWEL,OR,DSP,ST,BLUE,STD,6/PK,12PK/CS: Brand: MEDLINE

## (undated) DEVICE — COVER PRB W14XL147CM TELESCOPICALLY FLD EXT LEN CIV-FLEX

## (undated) DEVICE — COVER,LIGHT HANDLE,FLX,2/PK: Brand: MEDLINE INDUSTRIES, INC.

## (undated) DEVICE — CATHETER DIAG AD 5FR L100CM COR GRY HYDRPHLC NYL MPA 2 W/ 2

## (undated) DEVICE — APPLICATOR MEDICATED 26 CC SOLUTION HI LT ORNG CHLORAPREP

## (undated) DEVICE — BLADE,STAINLESS-STEEL,11,STRL,DISPOSABLE: Brand: MEDLINE

## (undated) DEVICE — AMPLATZ EXTRA STIFF WIRE GUIDE: Brand: AMPLATZ

## (undated) DEVICE — CATHETER PACE 5FR L110CM 6FR INTRO D10CM 1MM SPACE POLYUR

## (undated) DEVICE — OPTIFOAM GENTLE EX, SACRUM, 9X9: Brand: MEDLINE

## (undated) DEVICE — REAGENT TEST UREASE RAPD CLOTEST F/

## (undated) DEVICE — BASIC: Brand: MEDLINE INDUSTRIES, INC.

## (undated) DEVICE — SET TRNQT W/ STD 7IN 12FR 5 TB 1 SNR DLP

## (undated) DEVICE — TAPE ADH W2INXL10YD PLAS TRNSPAR H2O RESIST HYPOALRG CURAD

## (undated) DEVICE — CATHETER ANGIO 5FR L100CM GRY S STL NYL JR4 3 SEG BRAID L

## (undated) DEVICE — Device

## (undated) DEVICE — BOWL MED L 32OZ PLAS W/ MOLD GRAD EZ OPN PEEL PCH

## (undated) DEVICE — CATHETER DIAG 5FR L110CM PIG CRV SZ 6 SIDE H DBL BRAID WIRE

## (undated) DEVICE — COVER,TABLE,60X90,STERILE: Brand: MEDLINE

## (undated) DEVICE — DRAPE EQUIP BANDED BG 36X28 IN W/ROUNDED CORNER SNAPKOVER

## (undated) DEVICE — CATHETER DIAG 5FR L100CM LUMN ID0.047IN JR4 CRV 0 SIDE H

## (undated) DEVICE — CATHETER ANGIO 5FR L100CM ID0.045IN AL1 CRV ROBUST SHFT

## (undated) DEVICE — FORCEPS BX OVL CUP FEN DISPOSABLE CAP L 160CM RAD JAW 4

## (undated) DEVICE — GLOVE ORANGE PI 7   MSG9070

## (undated) DEVICE — GLOVE SURG 7 PF POLYMER COAT WHT STRL SIGN LTX ESSENTIAL LTX

## (undated) DEVICE — 3M™ IOBAN™ 2 ANTIMICROBIAL INCISE DRAPE 6650EZ: Brand: IOBAN™ 2

## (undated) DEVICE — BLOCK BITE 60FR RUBBER ADLT DENTAL

## (undated) DEVICE — GRADUATE TRIANG MEASURE 1000ML BLK PRNT

## (undated) DEVICE — GUIDEWIRE VASCULAR L145CM 0.035IN J TIP L3MM PTFE FIX COR NAMIC

## (undated) DEVICE — GAUZE,SPONGE,4"X4",8PLY,STRL,LF,10/TRAY: Brand: MEDLINE

## (undated) DEVICE — PERCLOSE™ PROSTYLE™ SUTURE-MEDIATED CLOSURE AND REPAIR SYSTEM: Brand: PERCLOSE™ PROSTYLE™

## (undated) DEVICE — ELECTRODE PT RET AD L9FT HI MOIST COND ADH HYDRGEL CORDED

## (undated) DEVICE — GLOVE SURG SZ 65 THK91MIL LTX FREE SYN POLYISOPRENE

## (undated) DEVICE — SYRINGE MED 10ML LUERLOCK TIP W/O SFTY DISP

## (undated) DEVICE — SYRINGE MED 50ML LUERLOCK TIP

## (undated) DEVICE — GLOVE SURG SZ 7.5 L11.73IN FNGR THK9.8MIL STRW LTX POLYMER

## (undated) DEVICE — LOOP VES W25MM THK1MM MAXI RED SIL FLD REPELLENT 100 PER

## (undated) DEVICE — KIT MFLD ISOLATN NACL CNTRST PRT TBNG SPIK W/ PRSS TRNSDUC

## (undated) DEVICE — SOLUTION IV MULT ELECLYT 1000 ML PH 7.4 INJ ISOLYTE S

## (undated) DEVICE — INTRODUCER SHTH 6FR L12CM DIA0.038IN HEMSTAS CLOSE TOL

## (undated) DEVICE — DRESSING TRNSPAR W4XL55IN ACRYL SUP FLM W ADH WTRPRF OPSITE

## (undated) DEVICE — DRAPE,REIN 53X77,STERILE: Brand: MEDLINE

## (undated) DEVICE — CATHETER DIAG 5FR L110CM ID0.045IN VENT VASC PGTL 145 EXPO

## (undated) DEVICE — GLOVE ORANGE PI 7 1/2   MSG9075

## (undated) DEVICE — GOWN,SIRUS,FABRNF,XL,20/CS: Brand: MEDLINE

## (undated) DEVICE — BLADE CLIPPER GEN PURP NS

## (undated) DEVICE — WIPES SKIN CLOTH READYPREP 9 X 10.5 IN 2% CHLORHEX GLUCONATE CHG PREOP

## (undated) DEVICE — ANGIOGRAPHIC CATHETER: Brand: EXPO™

## (undated) DEVICE — KIT HND CTRL AT-XL65

## (undated) DEVICE — MEDIA CONTRAST ISOVUE 300 100ML

## (undated) DEVICE — BENTSON GUIDEWIRE ANGIO L150CM OD0.035IN STR FIX PTFE SLD SUPP

## (undated) DEVICE — TRUE™ FLOW VALVULOPLASTY PERFUSION CATHETER, 18 MM X 3.5 CM, 110 CM CATHETER: Brand: TRUE FLOW

## (undated) DEVICE — NDLHOLDER F/P WEBSTER FN8064 S: Brand: CENTURION MEDICAL PRODUCTS CORP

## (undated) DEVICE — GUIDEWIRE VASC L260CM DIA0.035IN BRECKER FOR COREVLV

## (undated) DEVICE — RADIFOCUS GLIDEWIRE: Brand: GLIDEWIRE

## (undated) DEVICE — MEDI-TRACE CADENCE ADULT, PRE-CONNECT  DEFIBRILLATION ELECTRODE (10 PR/PK) - PHYSIO-CONTROL: Brand: MEDI-TRACE CADENCE

## (undated) DEVICE — ELECTRODE ES AD PED L2.5IN TEF INSUL MOD NONCORDED BLDE TIP

## (undated) DEVICE — TOWEL,OR,DSP,ST,WHITE,DLX,4/PK,20PK/CS: Brand: MEDLINE

## (undated) DEVICE — PINNACLE INTRODUCER SHEATH: Brand: PINNACLE

## (undated) DEVICE — BLADE,STAINLESS-STEEL,15,STRL,DISPOSABLE: Brand: MEDLINE

## (undated) DEVICE — NEEDLE SPNL 20GA L3.5IN YEL HUB S STL REG WALL FIT STYL

## (undated) DEVICE — GOWN,SIRUS,FABRNF,L,20/CS: Brand: MEDLINE

## (undated) DEVICE — PICO 7 10CM X 30CM: Brand: PICO™ 7

## (undated) DEVICE — LARGE BORE STOPCOCK WITH ROTATING MALE LUER LOCK

## (undated) DEVICE — KIT HND CTRL 3 W STPCOCK ROT END 54IN PREM HI PRSS TBNG AT

## (undated) DEVICE — SET INTRO SHTH 5FR L45CM GRY INTEGR SIDE PRT HAEMOSTASIS

## (undated) DEVICE — SOLUTION IV 1000ML 0.9% SOD CHL PH 5 INJ USP VIAFLX PLAS

## (undated) DEVICE — ANGIOPLASTY ADD-ON PACK: Brand: MEDLINE INDUSTRIES, INC.

## (undated) DEVICE — BLADE,STAINLESS-STEEL,20,STRL,DISPOSABLE: Brand: MEDLINE

## (undated) DEVICE — DRAPE SHEET: Brand: UNBRANDED

## (undated) DEVICE — DRAPE, MULTI FENESTRATED, HYBRID OR, STE: Brand: MEDLINE

## (undated) DEVICE — GUIDEWIRE VASC L260CM 0.035IN J TIP L3MM PTFE FIX COR NAMIC

## (undated) DEVICE — SYRINGE MED 20ML STD CLR PLAS LUERLOCK TIP N CTRL DISP

## (undated) DEVICE — BLADE,STAINLESS-STEEL,10,STRL,DISPOSABLE: Brand: MEDLINE

## (undated) DEVICE — PERCUTANEOUS ENTRY THINWALL NEEDLE  ONE-PART: Brand: COOK

## (undated) DEVICE — SPONGE GZ W4XL4IN RAYON POLY FILL CVR W/ NONWOVEN FAB STERILE

## (undated) DEVICE — KIT MED IMAG CNTRST AGNT W/ IOPAMIDOL REUSE

## (undated) DEVICE — LABEL MED CARD SURG 4 IN PANEL STRL